# Patient Record
Sex: FEMALE | Race: WHITE | NOT HISPANIC OR LATINO | URBAN - METROPOLITAN AREA
[De-identification: names, ages, dates, MRNs, and addresses within clinical notes are randomized per-mention and may not be internally consistent; named-entity substitution may affect disease eponyms.]

---

## 2023-01-01 ENCOUNTER — APPOINTMENT (OUTPATIENT)
Dept: RADIOLOGY | Facility: HOSPITAL | Age: 83
DRG: 193 | End: 2023-01-01
Payer: MEDICARE

## 2023-01-01 ENCOUNTER — HOSPITAL ENCOUNTER (INPATIENT)
Facility: HOSPITAL | Age: 83
LOS: 4 days | Discharge: NURSING, REHAB OR LONG-TERM CARE | DRG: 193 | End: 2023-11-22
Attending: EMERGENCY MEDICINE | Admitting: STUDENT IN AN ORGANIZED HEALTH CARE EDUCATION/TRAINING PROGRAM
Payer: MEDICARE

## 2023-01-01 ENCOUNTER — APPOINTMENT (OUTPATIENT)
Dept: CARDIOLOGY | Facility: HOSPITAL | Age: 83
DRG: 193 | End: 2023-01-01
Payer: MEDICARE

## 2023-01-01 ENCOUNTER — HOSPITAL ENCOUNTER (INPATIENT)
Facility: HOSPITAL | Age: 83
LOS: 6 days | Discharge: HOSPICE | DRG: 193 | End: 2023-11-30
Attending: EMERGENCY MEDICINE | Admitting: STUDENT IN AN ORGANIZED HEALTH CARE EDUCATION/TRAINING PROGRAM
Payer: MEDICARE

## 2023-01-01 ENCOUNTER — HOSPITAL ENCOUNTER (INPATIENT)
Facility: HOSPITAL | Age: 83
LOS: 1 days | DRG: 193 | End: 2023-12-01
Attending: STUDENT IN AN ORGANIZED HEALTH CARE EDUCATION/TRAINING PROGRAM | Admitting: STUDENT IN AN ORGANIZED HEALTH CARE EDUCATION/TRAINING PROGRAM
Payer: MEDICARE

## 2023-01-01 VITALS
SYSTOLIC BLOOD PRESSURE: 129 MMHG | DIASTOLIC BLOOD PRESSURE: 61 MMHG | WEIGHT: 160.05 LBS | BODY MASS INDEX: 30.22 KG/M2 | RESPIRATION RATE: 20 BRPM | HEIGHT: 61 IN | TEMPERATURE: 97.2 F | HEART RATE: 73 BPM | OXYGEN SATURATION: 84 %

## 2023-01-01 VITALS
TEMPERATURE: 102.2 F | HEART RATE: 57 BPM | OXYGEN SATURATION: 91 % | SYSTOLIC BLOOD PRESSURE: 66 MMHG | DIASTOLIC BLOOD PRESSURE: 36 MMHG | RESPIRATION RATE: 20 BRPM

## 2023-01-01 VITALS
RESPIRATION RATE: 46 BRPM | HEIGHT: 67 IN | DIASTOLIC BLOOD PRESSURE: 86 MMHG | TEMPERATURE: 97.7 F | WEIGHT: 160.05 LBS | OXYGEN SATURATION: 91 % | SYSTOLIC BLOOD PRESSURE: 164 MMHG | BODY MASS INDEX: 25.12 KG/M2 | HEART RATE: 76 BPM

## 2023-01-01 DIAGNOSIS — I48.0 PAROXYSMAL ATRIAL FIBRILLATION (MULTI): ICD-10-CM

## 2023-01-01 DIAGNOSIS — R41.0 DISORIENTATION: Primary | ICD-10-CM

## 2023-01-01 DIAGNOSIS — I50.22 CHRONIC SYSTOLIC HEART FAILURE (MULTI): ICD-10-CM

## 2023-01-01 DIAGNOSIS — M81.0 AGE RELATED OSTEOPOROSIS, UNSPECIFIED PATHOLOGICAL FRACTURE PRESENCE: Chronic | ICD-10-CM

## 2023-01-01 DIAGNOSIS — I50.43 CHF (CONGESTIVE HEART FAILURE), NYHA CLASS I, ACUTE ON CHRONIC, COMBINED (MULTI): Primary | ICD-10-CM

## 2023-01-01 DIAGNOSIS — J96.12 CHRONIC RESPIRATORY FAILURE WITH HYPOXIA AND HYPERCAPNIA (MULTI): ICD-10-CM

## 2023-01-01 DIAGNOSIS — J96.11 CHRONIC RESPIRATORY FAILURE WITH HYPOXIA AND HYPERCAPNIA (MULTI): ICD-10-CM

## 2023-01-01 DIAGNOSIS — J18.9 PNEUMONIA DUE TO INFECTIOUS ORGANISM, UNSPECIFIED LATERALITY, UNSPECIFIED PART OF LUNG: Primary | ICD-10-CM

## 2023-01-01 DIAGNOSIS — J16.0 CAP (COMMUNITY ACQUIRED PNEUMONIA) DUE TO CHLAMYDIA SPECIES: ICD-10-CM

## 2023-01-01 DIAGNOSIS — N39.0 LOWER URINARY TRACT INFECTIOUS DISEASE: ICD-10-CM

## 2023-01-01 DIAGNOSIS — J96.01 ACUTE RESPIRATORY FAILURE WITH HYPOXIA (MULTI): ICD-10-CM

## 2023-01-01 LAB
ABO GROUP (TYPE) IN BLOOD: NORMAL
ALBUMIN SERPL BCP-MCNC: 3 G/DL (ref 3.4–5)
ALBUMIN SERPL BCP-MCNC: 3 G/DL (ref 3.4–5)
ALBUMIN SERPL BCP-MCNC: 3.2 G/DL (ref 3.4–5)
ALBUMIN SERPL BCP-MCNC: 3.7 G/DL (ref 3.4–5)
ALP SERPL-CCNC: 57 U/L (ref 33–136)
ALP SERPL-CCNC: 59 U/L (ref 33–136)
ALP SERPL-CCNC: 82 U/L (ref 33–136)
ALT SERPL W P-5'-P-CCNC: 10 U/L (ref 7–45)
ALT SERPL W P-5'-P-CCNC: 10 U/L (ref 7–45)
ALT SERPL W P-5'-P-CCNC: 20 U/L (ref 7–45)
ANION GAP BLDA CALCULATED.4IONS-SCNC: -2 MMO/L (ref 10–25)
ANION GAP BLDA CALCULATED.4IONS-SCNC: -3 MMO/L (ref 10–25)
ANION GAP SERPL CALC-SCNC: 10 MMOL/L (ref 10–20)
ANION GAP SERPL CALC-SCNC: 11 MMOL/L (ref 10–20)
ANION GAP SERPL CALC-SCNC: 12 MMOL/L (ref 10–20)
ANION GAP SERPL CALC-SCNC: 14 MMOL/L (ref 10–20)
ANION GAP SERPL CALC-SCNC: 7 MMOL/L (ref 10–20)
ANION GAP SERPL CALC-SCNC: 7 MMOL/L (ref 10–20)
ANION GAP SERPL CALC-SCNC: 8 MMOL/L (ref 10–20)
ANION GAP SERPL CALC-SCNC: 9 MMOL/L (ref 10–20)
ANION GAP SERPL CALC-SCNC: <7 MMOL/L (ref 10–20)
ANTIBODY SCREEN: NORMAL
AORTIC VALVE MEAN GRADIENT: 10
AORTIC VALVE PEAK VELOCITY: 2.19
APPEARANCE UR: ABNORMAL
APPEARANCE UR: CLEAR
APTT PPP: 39 SECONDS (ref 27–38)
ARTERIAL PATENCY WRIST A: POSITIVE
ARTERIAL PATENCY WRIST A: POSITIVE
AST SERPL W P-5'-P-CCNC: 11 U/L (ref 9–39)
AST SERPL W P-5'-P-CCNC: 17 U/L (ref 9–39)
AST SERPL W P-5'-P-CCNC: 17 U/L (ref 9–39)
AV PEAK GRADIENT: 19.2
AVA (PEAK VEL): 1.76
AVA (VTI): 1.73
BACTERIA BLD CULT: NORMAL
BACTERIA SPEC RESP CULT: ABNORMAL
BASE EXCESS BLDA CALC-SCNC: 18.2 MMOL/L (ref -2–3)
BASE EXCESS BLDA CALC-SCNC: 18.4 MMOL/L (ref -2–3)
BASE EXCESS BLDV CALC-SCNC: 13.5 MMOL/L (ref -2–3)
BASE EXCESS BLDV CALC-SCNC: 8.8 MMOL/L (ref -2–3)
BASOPHILS # BLD AUTO: 0 X10*3/UL (ref 0–0.1)
BASOPHILS # BLD AUTO: 0.02 X10*3/UL (ref 0–0.1)
BASOPHILS # BLD AUTO: 0.03 X10*3/UL (ref 0–0.1)
BASOPHILS NFR BLD AUTO: 0 %
BASOPHILS NFR BLD AUTO: 0.3 %
BASOPHILS NFR BLD AUTO: 0.8 %
BILIRUB SERPL-MCNC: 0.5 MG/DL (ref 0–1.2)
BILIRUB SERPL-MCNC: 0.6 MG/DL (ref 0–1.2)
BILIRUB SERPL-MCNC: 0.6 MG/DL (ref 0–1.2)
BILIRUB UR STRIP.AUTO-MCNC: NEGATIVE MG/DL
BILIRUB UR STRIP.AUTO-MCNC: NEGATIVE MG/DL
BNP SERPL-MCNC: 138 PG/ML (ref 0–99)
BNP SERPL-MCNC: 185 PG/ML (ref 0–99)
BODY TEMPERATURE: 37 DEGREES CELSIUS
BODY TEMPERATURE: ABNORMAL
BUN SERPL-MCNC: 10 MG/DL (ref 6–23)
BUN SERPL-MCNC: 12 MG/DL (ref 6–23)
BUN SERPL-MCNC: 14 MG/DL (ref 6–23)
BUN SERPL-MCNC: 18 MG/DL (ref 6–23)
BUN SERPL-MCNC: 20 MG/DL (ref 6–23)
BUN SERPL-MCNC: 20 MG/DL (ref 6–23)
BUN SERPL-MCNC: 22 MG/DL (ref 6–23)
BUN SERPL-MCNC: 24 MG/DL (ref 6–23)
BUN SERPL-MCNC: 26 MG/DL (ref 6–23)
BUN SERPL-MCNC: 26 MG/DL (ref 6–23)
BUN SERPL-MCNC: 27 MG/DL (ref 6–23)
BUN SERPL-MCNC: 27 MG/DL (ref 6–23)
BUN SERPL-MCNC: 28 MG/DL (ref 6–23)
BUN SERPL-MCNC: 31 MG/DL (ref 6–23)
BUN SERPL-MCNC: 9 MG/DL (ref 6–23)
CA-I BLDA-SCNC: 1.16 MMOL/L (ref 1.1–1.33)
CA-I BLDA-SCNC: 1.19 MMOL/L (ref 1.1–1.33)
CALCIUM SERPL-MCNC: 8.4 MG/DL (ref 8.6–10.3)
CALCIUM SERPL-MCNC: 8.5 MG/DL (ref 8.6–10.3)
CALCIUM SERPL-MCNC: 8.6 MG/DL (ref 8.6–10.3)
CALCIUM SERPL-MCNC: 8.7 MG/DL (ref 8.6–10.3)
CALCIUM SERPL-MCNC: 8.8 MG/DL (ref 8.6–10.3)
CALCIUM SERPL-MCNC: 8.9 MG/DL (ref 8.6–10.3)
CALCIUM SERPL-MCNC: 8.9 MG/DL (ref 8.6–10.3)
CALCIUM SERPL-MCNC: 9.1 MG/DL (ref 8.6–10.3)
CALCIUM SERPL-MCNC: 9.3 MG/DL (ref 8.6–10.3)
CARDIAC TROPONIN I PNL SERPL HS: 20 NG/L (ref 0–13)
CARDIAC TROPONIN I PNL SERPL HS: 26 NG/L (ref 0–13)
CARDIAC TROPONIN I PNL SERPL HS: 28 NG/L (ref 0–13)
CHLORIDE BLDA-SCNC: 96 MMOL/L (ref 98–107)
CHLORIDE BLDA-SCNC: 96 MMOL/L (ref 98–107)
CHLORIDE SERPL-SCNC: 100 MMOL/L (ref 98–107)
CHLORIDE SERPL-SCNC: 92 MMOL/L (ref 98–107)
CHLORIDE SERPL-SCNC: 95 MMOL/L (ref 98–107)
CHLORIDE SERPL-SCNC: 96 MMOL/L (ref 98–107)
CHLORIDE SERPL-SCNC: 96 MMOL/L (ref 98–107)
CHLORIDE SERPL-SCNC: 97 MMOL/L (ref 98–107)
CHLORIDE SERPL-SCNC: 98 MMOL/L (ref 98–107)
CHLORIDE SERPL-SCNC: 99 MMOL/L (ref 98–107)
CHLORIDE SERPL-SCNC: 99 MMOL/L (ref 98–107)
CO2 SERPL-SCNC: 29 MMOL/L (ref 21–32)
CO2 SERPL-SCNC: 33 MMOL/L (ref 21–32)
CO2 SERPL-SCNC: 36 MMOL/L (ref 21–32)
CO2 SERPL-SCNC: 37 MMOL/L (ref 21–32)
CO2 SERPL-SCNC: 38 MMOL/L (ref 21–32)
CO2 SERPL-SCNC: 38 MMOL/L (ref 21–32)
CO2 SERPL-SCNC: 39 MMOL/L (ref 21–32)
CO2 SERPL-SCNC: 40 MMOL/L (ref 21–32)
CO2 SERPL-SCNC: 40 MMOL/L (ref 21–32)
CO2 SERPL-SCNC: 41 MMOL/L (ref 21–32)
CO2 SERPL-SCNC: 41 MMOL/L (ref 21–32)
CO2 SERPL-SCNC: 42 MMOL/L (ref 21–32)
CO2 SERPL-SCNC: 44 MMOL/L (ref 21–32)
CO2 SERPL-SCNC: 44 MMOL/L (ref 21–32)
COLOR UR: YELLOW
COLOR UR: YELLOW
CREAT SERPL-MCNC: 0.6 MG/DL (ref 0.5–1.05)
CREAT SERPL-MCNC: 0.6 MG/DL (ref 0.5–1.05)
CREAT SERPL-MCNC: 0.62 MG/DL (ref 0.5–1.05)
CREAT SERPL-MCNC: 0.65 MG/DL (ref 0.5–1.05)
CREAT SERPL-MCNC: 0.66 MG/DL (ref 0.5–1.05)
CREAT SERPL-MCNC: 0.71 MG/DL (ref 0.5–1.05)
CREAT SERPL-MCNC: 0.75 MG/DL (ref 0.5–1.05)
CREAT SERPL-MCNC: 0.76 MG/DL (ref 0.5–1.05)
CREAT SERPL-MCNC: 0.76 MG/DL (ref 0.5–1.05)
CREAT SERPL-MCNC: 0.8 MG/DL (ref 0.5–1.05)
CREAT SERPL-MCNC: 0.8 MG/DL (ref 0.5–1.05)
CREAT SERPL-MCNC: 0.82 MG/DL (ref 0.5–1.05)
CREAT SERPL-MCNC: 0.83 MG/DL (ref 0.5–1.05)
CREAT SERPL-MCNC: 0.83 MG/DL (ref 0.5–1.05)
CREAT SERPL-MCNC: 0.86 MG/DL (ref 0.5–1.05)
CRITICAL CALL TIME: 1
CRITICAL CALL TIME: 2038
CRITICAL CALLED BY: ABNORMAL
CRITICAL CALLED BY: ABNORMAL
CRITICAL CALLED TO: ABNORMAL
CRITICAL CALLED TO: ABNORMAL
CRITICAL READ BACK: ABNORMAL
CRITICAL READ BACK: ABNORMAL
DIGOXIN SERPL-MCNC: 1.18 NG/ML (ref 0.8–?)
EJECTION FRACTION APICAL 4 CHAMBER: 52.4
EJECTION FRACTION: 51
EOSINOPHIL # BLD AUTO: 0 X10*3/UL (ref 0–0.4)
EOSINOPHIL # BLD AUTO: 0.18 X10*3/UL (ref 0–0.4)
EOSINOPHIL # BLD AUTO: 0.39 X10*3/UL (ref 0–0.4)
EOSINOPHIL NFR BLD AUTO: 0 %
EOSINOPHIL NFR BLD AUTO: 4.7 %
EOSINOPHIL NFR BLD AUTO: 5.2 %
EPAP CMH2O: 8 CM H2O
ERYTHROCYTE [DISTWIDTH] IN BLOOD BY AUTOMATED COUNT: 12.6 % (ref 11.5–14.5)
ERYTHROCYTE [DISTWIDTH] IN BLOOD BY AUTOMATED COUNT: 12.7 % (ref 11.5–14.5)
ERYTHROCYTE [DISTWIDTH] IN BLOOD BY AUTOMATED COUNT: 12.8 % (ref 11.5–14.5)
ERYTHROCYTE [DISTWIDTH] IN BLOOD BY AUTOMATED COUNT: 12.9 % (ref 11.5–14.5)
ERYTHROCYTE [DISTWIDTH] IN BLOOD BY AUTOMATED COUNT: 12.9 % (ref 11.5–14.5)
ERYTHROCYTE [DISTWIDTH] IN BLOOD BY AUTOMATED COUNT: 13 % (ref 11.5–14.5)
ERYTHROCYTE [DISTWIDTH] IN BLOOD BY AUTOMATED COUNT: 13 % (ref 11.5–14.5)
ERYTHROCYTE [DISTWIDTH] IN BLOOD BY AUTOMATED COUNT: 13.2 % (ref 11.5–14.5)
FLUAV RNA RESP QL NAA+PROBE: NOT DETECTED
FLUAV RNA RESP QL NAA+PROBE: NOT DETECTED
FLUBV RNA RESP QL NAA+PROBE: NOT DETECTED
FLUBV RNA RESP QL NAA+PROBE: NOT DETECTED
FOLATE SERPL-MCNC: 7.9 NG/ML
FREQUENCY (BPM): 37 BPM
GFR SERPL CREATININE-BSD FRML MDRD: 66 ML/MIN/1.73M*2
GFR SERPL CREATININE-BSD FRML MDRD: 69 ML/MIN/1.73M*2
GFR SERPL CREATININE-BSD FRML MDRD: 69 ML/MIN/1.73M*2
GFR SERPL CREATININE-BSD FRML MDRD: 70 ML/MIN/1.73M*2
GFR SERPL CREATININE-BSD FRML MDRD: 72 ML/MIN/1.73M*2
GFR SERPL CREATININE-BSD FRML MDRD: 72 ML/MIN/1.73M*2
GFR SERPL CREATININE-BSD FRML MDRD: 77 ML/MIN/1.73M*2
GFR SERPL CREATININE-BSD FRML MDRD: 77 ML/MIN/1.73M*2
GFR SERPL CREATININE-BSD FRML MDRD: 78 ML/MIN/1.73M*2
GFR SERPL CREATININE-BSD FRML MDRD: 83 ML/MIN/1.73M*2
GFR SERPL CREATININE-BSD FRML MDRD: 86 ML/MIN/1.73M*2
GFR SERPL CREATININE-BSD FRML MDRD: 87 ML/MIN/1.73M*2
GFR SERPL CREATININE-BSD FRML MDRD: 88 ML/MIN/1.73M*2
GFR SERPL CREATININE-BSD FRML MDRD: 88 ML/MIN/1.73M*2
GLOBAL LONGITUDINAL STRAIN: 15.9
GLUCOSE BLD MANUAL STRIP-MCNC: 132 MG/DL (ref 74–99)
GLUCOSE BLDA-MCNC: 101 MG/DL (ref 74–99)
GLUCOSE BLDA-MCNC: 96 MG/DL (ref 74–99)
GLUCOSE SERPL-MCNC: 104 MG/DL (ref 74–99)
GLUCOSE SERPL-MCNC: 109 MG/DL (ref 74–99)
GLUCOSE SERPL-MCNC: 114 MG/DL (ref 74–99)
GLUCOSE SERPL-MCNC: 118 MG/DL (ref 74–99)
GLUCOSE SERPL-MCNC: 123 MG/DL (ref 74–99)
GLUCOSE SERPL-MCNC: 135 MG/DL (ref 74–99)
GLUCOSE SERPL-MCNC: 158 MG/DL (ref 74–99)
GLUCOSE SERPL-MCNC: 165 MG/DL (ref 74–99)
GLUCOSE SERPL-MCNC: 166 MG/DL (ref 74–99)
GLUCOSE SERPL-MCNC: 79 MG/DL (ref 74–99)
GLUCOSE SERPL-MCNC: 81 MG/DL (ref 74–99)
GLUCOSE SERPL-MCNC: 83 MG/DL (ref 74–99)
GLUCOSE SERPL-MCNC: 84 MG/DL (ref 74–99)
GLUCOSE SERPL-MCNC: 91 MG/DL (ref 74–99)
GLUCOSE SERPL-MCNC: 92 MG/DL (ref 74–99)
GLUCOSE SERPL-MCNC: 92 MG/DL (ref 74–99)
GLUCOSE SERPL-MCNC: 95 MG/DL (ref 74–99)
GLUCOSE UR STRIP.AUTO-MCNC: ABNORMAL MG/DL
GLUCOSE UR STRIP.AUTO-MCNC: NEGATIVE MG/DL
GRAM STN SPEC: ABNORMAL
HCO3 BLDA-SCNC: 48 MMOL/L (ref 22–26)
HCO3 BLDA-SCNC: 49.8 MMOL/L (ref 22–26)
HCO3 BLDV-SCNC: 37 MMOL/L (ref 22–26)
HCO3 BLDV-SCNC: 41.8 MMOL/L (ref 22–26)
HCT VFR BLD AUTO: 31.9 % (ref 36–46)
HCT VFR BLD AUTO: 33.3 % (ref 36–46)
HCT VFR BLD AUTO: 33.5 % (ref 36–46)
HCT VFR BLD AUTO: 33.6 % (ref 36–46)
HCT VFR BLD AUTO: 33.7 % (ref 36–46)
HCT VFR BLD AUTO: 35.4 % (ref 36–46)
HCT VFR BLD AUTO: 35.4 % (ref 36–46)
HCT VFR BLD AUTO: 36.7 % (ref 36–46)
HCT VFR BLD AUTO: 36.7 % (ref 36–46)
HCT VFR BLD AUTO: 37.3 % (ref 36–46)
HCT VFR BLD AUTO: 38.2 % (ref 36–46)
HCT VFR BLD AUTO: 38.3 % (ref 36–46)
HCT VFR BLD AUTO: 38.3 % (ref 36–46)
HCT VFR BLD AUTO: 38.6 % (ref 36–46)
HCT VFR BLD AUTO: 38.8 % (ref 36–46)
HCT VFR BLD AUTO: 38.8 % (ref 36–46)
HCT VFR BLD AUTO: 39.7 % (ref 36–46)
HCT VFR BLD AUTO: 40.9 % (ref 36–46)
HCT VFR BLD EST: 35 % (ref 36–46)
HCT VFR BLD EST: 35 % (ref 36–46)
HGB BLD-MCNC: 10.4 G/DL (ref 12–16)
HGB BLD-MCNC: 10.4 G/DL (ref 12–16)
HGB BLD-MCNC: 10.8 G/DL (ref 12–16)
HGB BLD-MCNC: 10.8 G/DL (ref 12–16)
HGB BLD-MCNC: 10.9 G/DL (ref 12–16)
HGB BLD-MCNC: 11.1 G/DL (ref 12–16)
HGB BLD-MCNC: 11.2 G/DL (ref 12–16)
HGB BLD-MCNC: 11.2 G/DL (ref 12–16)
HGB BLD-MCNC: 11.3 G/DL (ref 12–16)
HGB BLD-MCNC: 11.4 G/DL (ref 12–16)
HGB BLD-MCNC: 11.4 G/DL (ref 12–16)
HGB BLD-MCNC: 11.6 G/DL (ref 12–16)
HGB BLD-MCNC: 11.7 G/DL (ref 12–16)
HGB BLD-MCNC: 11.8 G/DL (ref 12–16)
HGB BLD-MCNC: 12.4 G/DL (ref 12–16)
HGB BLD-MCNC: 12.4 G/DL (ref 12–16)
HGB BLD-MCNC: 9.7 G/DL (ref 12–16)
HGB BLD-MCNC: 9.8 G/DL (ref 12–16)
HGB BLDA-MCNC: 11.7 G/DL (ref 12–16)
HGB BLDA-MCNC: 11.8 G/DL (ref 12–16)
HOLD SPECIMEN: NORMAL
IMM GRANULOCYTES # BLD AUTO: 0.02 X10*3/UL (ref 0–0.5)
IMM GRANULOCYTES # BLD AUTO: 0.02 X10*3/UL (ref 0–0.5)
IMM GRANULOCYTES # BLD AUTO: 0.05 X10*3/UL (ref 0–0.5)
IMM GRANULOCYTES NFR BLD AUTO: 0.5 % (ref 0–0.9)
IMM GRANULOCYTES NFR BLD AUTO: 0.5 % (ref 0–0.9)
IMM GRANULOCYTES NFR BLD AUTO: 0.7 % (ref 0–0.9)
INHALED O2 CONCENTRATION: 32 %
INHALED O2 CONCENTRATION: 36 %
INHALED O2 CONCENTRATION: 50 %
INHALED O2 CONCENTRATION: 50 %
INR PPP: 1.3 (ref 0.9–1.1)
INSPIRATORY TIME: 0.9
IPAP CMH2O: 16 CM H2O
KETONES UR STRIP.AUTO-MCNC: NEGATIVE MG/DL
KETONES UR STRIP.AUTO-MCNC: NEGATIVE MG/DL
LACTATE BLDA-SCNC: 0.6 MMOL/L (ref 0.4–2)
LACTATE BLDA-SCNC: 0.8 MMOL/L (ref 0.4–2)
LACTATE SERPL-SCNC: 1.2 MMOL/L (ref 0.4–2)
LEFT VENTRICLE INTERNAL DIMENSION DIASTOLE: 4.63 (ref 3.5–6)
LEFT VENTRICULAR OUTFLOW TRACT DIAMETER: 2.1
LEGIONELLA AG UR QL: NEGATIVE
LEGIONELLA AG UR QL: NEGATIVE
LEUKOCYTE ESTERASE UR QL STRIP.AUTO: NEGATIVE
LEUKOCYTE ESTERASE UR QL STRIP.AUTO: NEGATIVE
LYMPHOCYTES # BLD AUTO: 0.26 X10*3/UL (ref 0.8–3)
LYMPHOCYTES # BLD AUTO: 0.47 X10*3/UL (ref 0.8–3)
LYMPHOCYTES # BLD AUTO: 0.59 X10*3/UL (ref 0.8–3)
LYMPHOCYTES NFR BLD AUTO: 15.2 %
LYMPHOCYTES NFR BLD AUTO: 5.9 %
LYMPHOCYTES NFR BLD AUTO: 6.3 %
M PNEUMO IGM SER IA-ACNC: 0 U/L
M PNEUMO IGM SER IA-ACNC: 0.09 U/L
MAGNESIUM SERPL-MCNC: 1.56 MG/DL (ref 1.6–2.4)
MAGNESIUM SERPL-MCNC: 1.59 MG/DL (ref 1.6–2.4)
MAGNESIUM SERPL-MCNC: 1.7 MG/DL (ref 1.6–2.4)
MAGNESIUM SERPL-MCNC: 1.71 MG/DL (ref 1.6–2.4)
MAGNESIUM SERPL-MCNC: 1.75 MG/DL (ref 1.6–2.4)
MAGNESIUM SERPL-MCNC: 1.8 MG/DL (ref 1.6–2.4)
MAGNESIUM SERPL-MCNC: 1.94 MG/DL (ref 1.6–2.4)
MAGNESIUM SERPL-MCNC: 1.96 MG/DL (ref 1.6–2.4)
MAGNESIUM SERPL-MCNC: 2.05 MG/DL (ref 1.6–2.4)
MAGNESIUM SERPL-MCNC: 2.09 MG/DL (ref 1.6–2.4)
MAGNESIUM SERPL-MCNC: 2.15 MG/DL (ref 1.6–2.4)
MAGNESIUM SERPL-MCNC: 2.18 MG/DL (ref 1.6–2.4)
MAGNESIUM SERPL-MCNC: 2.25 MG/DL (ref 1.6–2.4)
MCH RBC QN AUTO: 29.2 PG (ref 26–34)
MCH RBC QN AUTO: 29.2 PG (ref 26–34)
MCH RBC QN AUTO: 29.3 PG (ref 26–34)
MCH RBC QN AUTO: 29.6 PG (ref 26–34)
MCH RBC QN AUTO: 29.6 PG (ref 26–34)
MCH RBC QN AUTO: 29.7 PG (ref 26–34)
MCH RBC QN AUTO: 29.9 PG (ref 26–34)
MCH RBC QN AUTO: 30 PG (ref 26–34)
MCH RBC QN AUTO: 30 PG (ref 26–34)
MCH RBC QN AUTO: 30.1 PG (ref 26–34)
MCH RBC QN AUTO: 30.2 PG (ref 26–34)
MCH RBC QN AUTO: 30.2 PG (ref 26–34)
MCH RBC QN AUTO: 31.1 PG (ref 26–34)
MCH RBC QN AUTO: 31.2 PG (ref 26–34)
MCHC RBC AUTO-ENTMCNC: 29.4 G/DL (ref 32–36)
MCHC RBC AUTO-ENTMCNC: 29.6 G/DL (ref 32–36)
MCHC RBC AUTO-ENTMCNC: 29.8 G/DL (ref 32–36)
MCHC RBC AUTO-ENTMCNC: 30.1 G/DL (ref 32–36)
MCHC RBC AUTO-ENTMCNC: 30.3 G/DL (ref 32–36)
MCHC RBC AUTO-ENTMCNC: 30.4 G/DL (ref 32–36)
MCHC RBC AUTO-ENTMCNC: 30.5 G/DL (ref 32–36)
MCHC RBC AUTO-ENTMCNC: 30.8 G/DL (ref 32–36)
MCHC RBC AUTO-ENTMCNC: 30.8 G/DL (ref 32–36)
MCHC RBC AUTO-ENTMCNC: 31 G/DL (ref 32–36)
MCHC RBC AUTO-ENTMCNC: 31 G/DL (ref 32–36)
MCHC RBC AUTO-ENTMCNC: 31.2 G/DL (ref 32–36)
MCHC RBC AUTO-ENTMCNC: 32 G/DL (ref 32–36)
MCV RBC AUTO: 100 FL (ref 80–100)
MCV RBC AUTO: 101 FL (ref 80–100)
MCV RBC AUTO: 102 FL (ref 80–100)
MCV RBC AUTO: 103 FL (ref 80–100)
MCV RBC AUTO: 96 FL (ref 80–100)
MCV RBC AUTO: 97 FL (ref 80–100)
MCV RBC AUTO: 97 FL (ref 80–100)
MCV RBC AUTO: 98 FL (ref 80–100)
MCV RBC AUTO: 99 FL (ref 80–100)
MCV RBC AUTO: 99 FL (ref 80–100)
MITRAL VALVE E/A RATIO: 2.69
MONOCYTES # BLD AUTO: 0.32 X10*3/UL (ref 0.05–0.8)
MONOCYTES # BLD AUTO: 0.6 X10*3/UL (ref 0.05–0.8)
MONOCYTES # BLD AUTO: 1.12 X10*3/UL (ref 0.05–0.8)
MONOCYTES NFR BLD AUTO: 15.1 %
MONOCYTES NFR BLD AUTO: 15.5 %
MONOCYTES NFR BLD AUTO: 7.3 %
MRSA DNA SPEC QL NAA+PROBE: NOT DETECTED
NEUTROPHILS # BLD AUTO: 2.45 X10*3/UL (ref 1.6–5.5)
NEUTROPHILS # BLD AUTO: 3.77 X10*3/UL (ref 1.6–5.5)
NEUTROPHILS # BLD AUTO: 5.38 X10*3/UL (ref 1.6–5.5)
NEUTROPHILS NFR BLD AUTO: 63.3 %
NEUTROPHILS NFR BLD AUTO: 72.4 %
NEUTROPHILS NFR BLD AUTO: 86.3 %
NITRITE UR QL STRIP.AUTO: NEGATIVE
NITRITE UR QL STRIP.AUTO: NEGATIVE
NRBC BLD-RTO: 0 /100 WBCS (ref 0–0)
NRBC BLD-RTO: 0.6 /100 WBCS (ref 0–0)
NRBC BLD-RTO: 0.8 /100 WBCS (ref 0–0)
OXYHGB MFR BLDA: 92.4 % (ref 94–98)
OXYHGB MFR BLDA: 95.2 % (ref 94–98)
OXYHGB MFR BLDV: 71.4 % (ref 45–75)
OXYHGB MFR BLDV: 87.2 % (ref 45–75)
PCO2 BLDA: 106 MM HG (ref 38–42)
PCO2 BLDA: 89 MM HG (ref 38–42)
PCO2 BLDV: 67 MM HG (ref 41–51)
PCO2 BLDV: 69 MM HG (ref 41–51)
PEAK PRESSURE: 17 CM H2O
PEEP CMH2O: 8 CM H2O
PH BLDA: 7.28 PH (ref 7.38–7.42)
PH BLDA: 7.34 PH (ref 7.38–7.42)
PH BLDV: 7.35 PH (ref 7.33–7.43)
PH BLDV: 7.39 PH (ref 7.33–7.43)
PH UR STRIP.AUTO: 6 [PH]
PH UR STRIP.AUTO: 7 [PH]
PHOSPHATE SERPL-MCNC: 2.6 MG/DL (ref 2.5–4.9)
PLATELET # BLD AUTO: 127 X10*3/UL (ref 150–450)
PLATELET # BLD AUTO: 131 X10*3/UL (ref 150–450)
PLATELET # BLD AUTO: 144 X10*3/UL (ref 150–450)
PLATELET # BLD AUTO: 145 X10*3/UL (ref 150–450)
PLATELET # BLD AUTO: 145 X10*3/UL (ref 150–450)
PLATELET # BLD AUTO: 149 X10*3/UL (ref 150–450)
PLATELET # BLD AUTO: 152 X10*3/UL (ref 150–450)
PLATELET # BLD AUTO: 153 X10*3/UL (ref 150–450)
PLATELET # BLD AUTO: 159 X10*3/UL (ref 150–450)
PLATELET # BLD AUTO: 160 X10*3/UL (ref 150–450)
PLATELET # BLD AUTO: 164 X10*3/UL (ref 150–450)
PLATELET # BLD AUTO: 167 X10*3/UL (ref 150–450)
PLATELET # BLD AUTO: 176 X10*3/UL (ref 150–450)
PLATELET # BLD AUTO: 180 X10*3/UL (ref 150–450)
PLATELET # BLD AUTO: 181 X10*3/UL (ref 150–450)
PLATELET # BLD AUTO: 181 X10*3/UL (ref 150–450)
PLATELET # BLD AUTO: 197 X10*3/UL (ref 150–450)
PLATELET # BLD AUTO: 207 X10*3/UL (ref 150–450)
PO2 BLDA: 71 MM HG (ref 85–95)
PO2 BLDA: 84 MM HG (ref 85–95)
PO2 BLDV: 44 MM HG (ref 35–45)
PO2 BLDV: 59 MM HG (ref 35–45)
POTASSIUM BLDA-SCNC: 3.1 MMOL/L (ref 3.5–5.3)
POTASSIUM BLDA-SCNC: 3.2 MMOL/L (ref 3.5–5.3)
POTASSIUM SERPL-SCNC: 3 MMOL/L (ref 3.5–5.3)
POTASSIUM SERPL-SCNC: 3.4 MMOL/L (ref 3.5–5.3)
POTASSIUM SERPL-SCNC: 3.5 MMOL/L (ref 3.5–5.3)
POTASSIUM SERPL-SCNC: 3.6 MMOL/L (ref 3.5–5.3)
POTASSIUM SERPL-SCNC: 3.8 MMOL/L (ref 3.5–5.3)
POTASSIUM SERPL-SCNC: 3.9 MMOL/L (ref 3.5–5.3)
POTASSIUM SERPL-SCNC: 3.9 MMOL/L (ref 3.5–5.3)
POTASSIUM SERPL-SCNC: 4 MMOL/L (ref 3.5–5.3)
POTASSIUM SERPL-SCNC: 4 MMOL/L (ref 3.5–5.3)
POTASSIUM SERPL-SCNC: 4.1 MMOL/L (ref 3.5–5.3)
POTASSIUM SERPL-SCNC: 4.1 MMOL/L (ref 3.5–5.3)
POTASSIUM SERPL-SCNC: 4.2 MMOL/L (ref 3.5–5.3)
POTASSIUM SERPL-SCNC: 4.3 MMOL/L (ref 3.5–5.3)
PROCALCITONIN SERPL-MCNC: 0.12 NG/ML
PROT SERPL-MCNC: 5.4 G/DL (ref 6.4–8.2)
PROT SERPL-MCNC: 5.5 G/DL (ref 6.4–8.2)
PROT SERPL-MCNC: 6.3 G/DL (ref 6.4–8.2)
PROT UR STRIP.AUTO-MCNC: ABNORMAL MG/DL
PROT UR STRIP.AUTO-MCNC: NEGATIVE MG/DL
PROTHROMBIN TIME: 14.4 SECONDS (ref 9.8–12.8)
RBC # BLD AUTO: 3.23 X10*6/UL (ref 4–5.2)
RBC # BLD AUTO: 3.34 X10*6/UL (ref 4–5.2)
RBC # BLD AUTO: 3.45 X10*6/UL (ref 4–5.2)
RBC # BLD AUTO: 3.46 X10*6/UL (ref 4–5.2)
RBC # BLD AUTO: 3.5 X10*6/UL (ref 4–5.2)
RBC # BLD AUTO: 3.61 X10*6/UL (ref 4–5.2)
RBC # BLD AUTO: 3.63 X10*6/UL (ref 4–5.2)
RBC # BLD AUTO: 3.75 X10*6/UL (ref 4–5.2)
RBC # BLD AUTO: 3.76 X10*6/UL (ref 4–5.2)
RBC # BLD AUTO: 3.77 X10*6/UL (ref 4–5.2)
RBC # BLD AUTO: 3.83 X10*6/UL (ref 4–5.2)
RBC # BLD AUTO: 3.83 X10*6/UL (ref 4–5.2)
RBC # BLD AUTO: 3.85 X10*6/UL (ref 4–5.2)
RBC # BLD AUTO: 3.86 X10*6/UL (ref 4–5.2)
RBC # BLD AUTO: 3.89 X10*6/UL (ref 4–5.2)
RBC # BLD AUTO: 3.92 X10*6/UL (ref 4–5.2)
RBC # BLD AUTO: 3.99 X10*6/UL (ref 4–5.2)
RBC # BLD AUTO: 4.11 X10*6/UL (ref 4–5.2)
RBC # UR STRIP.AUTO: ABNORMAL /UL
RBC # UR STRIP.AUTO: NEGATIVE /UL
RBC #/AREA URNS AUTO: >20 /HPF
RH FACTOR (ANTIGEN D): NORMAL
RIGHT VENTRICLE PEAK SYSTOLIC PRESSURE: 45
S PNEUM AG UR QL: NEGATIVE
S PNEUM AG UR QL: NEGATIVE
SAO2 % BLDA: 95 % (ref 94–100)
SAO2 % BLDA: 98 % (ref 94–100)
SAO2 % BLDV: 74 % (ref 45–75)
SAO2 % BLDV: 90 % (ref 45–75)
SARS-COV-2 RNA RESP QL NAA+PROBE: NOT DETECTED
SARS-COV-2 RNA RESP QL NAA+PROBE: NOT DETECTED
SODIUM BLDA-SCNC: 139 MMOL/L (ref 136–145)
SODIUM BLDA-SCNC: 140 MMOL/L (ref 136–145)
SODIUM SERPL-SCNC: 136 MMOL/L (ref 136–145)
SODIUM SERPL-SCNC: 136 MMOL/L (ref 136–145)
SODIUM SERPL-SCNC: 137 MMOL/L (ref 136–145)
SODIUM SERPL-SCNC: 138 MMOL/L (ref 136–145)
SODIUM SERPL-SCNC: 138 MMOL/L (ref 136–145)
SODIUM SERPL-SCNC: 140 MMOL/L (ref 136–145)
SODIUM SERPL-SCNC: 141 MMOL/L (ref 136–145)
SODIUM SERPL-SCNC: 141 MMOL/L (ref 136–145)
SODIUM SERPL-SCNC: 142 MMOL/L (ref 136–145)
SODIUM SERPL-SCNC: 143 MMOL/L (ref 136–145)
SODIUM SERPL-SCNC: 147 MMOL/L (ref 136–145)
SODIUM SERPL-SCNC: 150 MMOL/L (ref 136–145)
SP GR UR STRIP.AUTO: 1.02
SP GR UR STRIP.AUTO: 1.02
SPECIMEN DRAWN FROM PATIENT: ABNORMAL
SPECIMEN DRAWN FROM PATIENT: ABNORMAL
SPONTANEOUS TIDAL VOLUME: 307 ML
SPONTANEOUS TIDAL VOLUME: 398 ML
SQUAMOUS #/AREA URNS AUTO: ABNORMAL /HPF
TEST COMMENT: ABNORMAL
TIDAL VOLUME: 400 ML
TOTAL MINUTE VOLUME: 10.6 LITER
TOTAL MINUTE VOLUME: 8.5 LITER
TRICUSPID ANNULAR PLANE SYSTOLIC EXCURSION: 1.3
TSH SERPL-ACNC: 1.58 MIU/L (ref 0.44–3.98)
UROBILINOGEN UR STRIP.AUTO-MCNC: <2 MG/DL
UROBILINOGEN UR STRIP.AUTO-MCNC: <2 MG/DL
VENTILATOR MODE: ABNORMAL
VENTILATOR RATE: 20 BPM
VIT B1 PYROPHOSHATE BLD-SCNC: 89 NMOL/L (ref 70–180)
VIT B12 SERPL-MCNC: 1339 PG/ML (ref 211–911)
WBC # BLD AUTO: 10.8 X10*3/UL (ref 4.4–11.3)
WBC # BLD AUTO: 3.1 X10*3/UL (ref 4.4–11.3)
WBC # BLD AUTO: 3.2 X10*3/UL (ref 4.4–11.3)
WBC # BLD AUTO: 3.9 X10*3/UL (ref 4.4–11.3)
WBC # BLD AUTO: 4.3 X10*3/UL (ref 4.4–11.3)
WBC # BLD AUTO: 4.3 X10*3/UL (ref 4.4–11.3)
WBC # BLD AUTO: 4.9 X10*3/UL (ref 4.4–11.3)
WBC # BLD AUTO: 5.4 X10*3/UL (ref 4.4–11.3)
WBC # BLD AUTO: 5.6 X10*3/UL (ref 4.4–11.3)
WBC # BLD AUTO: 6 X10*3/UL (ref 4.4–11.3)
WBC # BLD AUTO: 6.4 X10*3/UL (ref 4.4–11.3)
WBC # BLD AUTO: 6.5 X10*3/UL (ref 4.4–11.3)
WBC # BLD AUTO: 6.6 X10*3/UL (ref 4.4–11.3)
WBC # BLD AUTO: 6.6 X10*3/UL (ref 4.4–11.3)
WBC # BLD AUTO: 6.7 X10*3/UL (ref 4.4–11.3)
WBC # BLD AUTO: 7.4 X10*3/UL (ref 4.4–11.3)
WBC # BLD AUTO: 8.3 X10*3/UL (ref 4.4–11.3)
WBC # BLD AUTO: 9.5 X10*3/UL (ref 4.4–11.3)
WBC #/AREA URNS AUTO: ABNORMAL /HPF

## 2023-01-01 PROCEDURE — G0378 HOSPITAL OBSERVATION PER HR: HCPCS

## 2023-01-01 PROCEDURE — 85027 COMPLETE CBC AUTOMATED: CPT

## 2023-01-01 PROCEDURE — 94640 AIRWAY INHALATION TREATMENT: CPT

## 2023-01-01 PROCEDURE — 99239 HOSP IP/OBS DSCHRG MGMT >30: CPT

## 2023-01-01 PROCEDURE — 87449 NOS EACH ORGANISM AG IA: CPT | Mod: PARLAB

## 2023-01-01 PROCEDURE — 94660 CPAP INITIATION&MGMT: CPT

## 2023-01-01 PROCEDURE — 2500000001 HC RX 250 WO HCPCS SELF ADMINISTERED DRUGS (ALT 637 FOR MEDICARE OP)

## 2023-01-01 PROCEDURE — 36415 COLL VENOUS BLD VENIPUNCTURE: CPT

## 2023-01-01 PROCEDURE — 80048 BASIC METABOLIC PNL TOTAL CA: CPT

## 2023-01-01 PROCEDURE — 2500000004 HC RX 250 GENERAL PHARMACY W/ HCPCS (ALT 636 FOR OP/ED)

## 2023-01-01 PROCEDURE — 1200000002 HC GENERAL ROOM WITH TELEMETRY DAILY

## 2023-01-01 PROCEDURE — 87899 AGENT NOS ASSAY W/OPTIC: CPT | Mod: PARLAB

## 2023-01-01 PROCEDURE — 2500000002 HC RX 250 W HCPCS SELF ADMINISTERED DRUGS (ALT 637 FOR MEDICARE OP, ALT 636 FOR OP/ED): Performed by: STUDENT IN AN ORGANIZED HEALTH CARE EDUCATION/TRAINING PROGRAM

## 2023-01-01 PROCEDURE — 80053 COMPREHEN METABOLIC PANEL: CPT

## 2023-01-01 PROCEDURE — 94668 MNPJ CHEST WALL SBSQ: CPT

## 2023-01-01 PROCEDURE — 2500000002 HC RX 250 W HCPCS SELF ADMINISTERED DRUGS (ALT 637 FOR MEDICARE OP, ALT 636 FOR OP/ED): Performed by: INTERNAL MEDICINE

## 2023-01-01 PROCEDURE — 2500000002 HC RX 250 W HCPCS SELF ADMINISTERED DRUGS (ALT 637 FOR MEDICARE OP, ALT 636 FOR OP/ED): Performed by: EMERGENCY MEDICINE

## 2023-01-01 PROCEDURE — 99232 SBSQ HOSP IP/OBS MODERATE 35: CPT

## 2023-01-01 PROCEDURE — 71045 X-RAY EXAM CHEST 1 VIEW: CPT | Mod: FY

## 2023-01-01 PROCEDURE — 82746 ASSAY OF FOLIC ACID SERUM: CPT | Mod: PARLAB

## 2023-01-01 PROCEDURE — 97535 SELF CARE MNGMENT TRAINING: CPT | Mod: GO

## 2023-01-01 PROCEDURE — 86738 MYCOPLASMA ANTIBODY: CPT

## 2023-01-01 PROCEDURE — 2500000001 HC RX 250 WO HCPCS SELF ADMINISTERED DRUGS (ALT 637 FOR MEDICARE OP): Performed by: STUDENT IN AN ORGANIZED HEALTH CARE EDUCATION/TRAINING PROGRAM

## 2023-01-01 PROCEDURE — 99222 1ST HOSP IP/OBS MODERATE 55: CPT | Performed by: INTERNAL MEDICINE

## 2023-01-01 PROCEDURE — 97535 SELF CARE MNGMENT TRAINING: CPT | Mod: CQ,GP

## 2023-01-01 PROCEDURE — 2500000005 HC RX 250 GENERAL PHARMACY W/O HCPCS

## 2023-01-01 PROCEDURE — 83880 ASSAY OF NATRIURETIC PEPTIDE: CPT | Performed by: EMERGENCY MEDICINE

## 2023-01-01 PROCEDURE — 71046 X-RAY EXAM CHEST 2 VIEWS: CPT | Performed by: RADIOLOGY

## 2023-01-01 PROCEDURE — 82805 BLOOD GASES W/O2 SATURATION: CPT | Performed by: EMERGENCY MEDICINE

## 2023-01-01 PROCEDURE — 85610 PROTHROMBIN TIME: CPT | Performed by: EMERGENCY MEDICINE

## 2023-01-01 PROCEDURE — 71045 X-RAY EXAM CHEST 1 VIEW: CPT | Mod: FOREIGN READ | Performed by: RADIOLOGY

## 2023-01-01 PROCEDURE — 83735 ASSAY OF MAGNESIUM: CPT | Performed by: EMERGENCY MEDICINE

## 2023-01-01 PROCEDURE — 82947 ASSAY GLUCOSE BLOOD QUANT: CPT

## 2023-01-01 PROCEDURE — 87040 BLOOD CULTURE FOR BACTERIA: CPT | Mod: PARLAB

## 2023-01-01 PROCEDURE — 81003 URINALYSIS AUTO W/O SCOPE: CPT

## 2023-01-01 PROCEDURE — 2500000004 HC RX 250 GENERAL PHARMACY W/ HCPCS (ALT 636 FOR OP/ED): Performed by: EMERGENCY MEDICINE

## 2023-01-01 PROCEDURE — 36415 COLL VENOUS BLD VENIPUNCTURE: CPT | Performed by: EMERGENCY MEDICINE

## 2023-01-01 PROCEDURE — 80053 COMPREHEN METABOLIC PANEL: CPT | Performed by: EMERGENCY MEDICINE

## 2023-01-01 PROCEDURE — 84484 ASSAY OF TROPONIN QUANT: CPT | Performed by: EMERGENCY MEDICINE

## 2023-01-01 PROCEDURE — 94760 N-INVAS EAR/PLS OXIMETRY 1: CPT

## 2023-01-01 PROCEDURE — 71275 CT ANGIOGRAPHY CHEST: CPT | Performed by: RADIOLOGY

## 2023-01-01 PROCEDURE — 97112 NEUROMUSCULAR REEDUCATION: CPT | Mod: GP

## 2023-01-01 PROCEDURE — 97116 GAIT TRAINING THERAPY: CPT | Mod: GP,CQ

## 2023-01-01 PROCEDURE — 2500000002 HC RX 250 W HCPCS SELF ADMINISTERED DRUGS (ALT 637 FOR MEDICARE OP, ALT 636 FOR OP/ED)

## 2023-01-01 PROCEDURE — 99232 SBSQ HOSP IP/OBS MODERATE 35: CPT | Performed by: INTERNAL MEDICINE

## 2023-01-01 PROCEDURE — 2500000004 HC RX 250 GENERAL PHARMACY W/ HCPCS (ALT 636 FOR OP/ED): Performed by: STUDENT IN AN ORGANIZED HEALTH CARE EDUCATION/TRAINING PROGRAM

## 2023-01-01 PROCEDURE — 97162 PT EVAL MOD COMPLEX 30 MIN: CPT | Mod: GP

## 2023-01-01 PROCEDURE — 99238 HOSP IP/OBS DSCHRG MGMT 30/<: CPT

## 2023-01-01 PROCEDURE — 86738 MYCOPLASMA ANTIBODY: CPT | Performed by: INTERNAL MEDICINE

## 2023-01-01 PROCEDURE — 96372 THER/PROPH/DIAG INJ SC/IM: CPT | Performed by: STUDENT IN AN ORGANIZED HEALTH CARE EDUCATION/TRAINING PROGRAM

## 2023-01-01 PROCEDURE — 85025 COMPLETE CBC W/AUTO DIFF WBC: CPT

## 2023-01-01 PROCEDURE — 70551 MRI BRAIN STEM W/O DYE: CPT

## 2023-01-01 PROCEDURE — 96375 TX/PRO/DX INJ NEW DRUG ADDON: CPT

## 2023-01-01 PROCEDURE — 83735 ASSAY OF MAGNESIUM: CPT

## 2023-01-01 PROCEDURE — 82607 VITAMIN B-12: CPT | Mod: PARLAB

## 2023-01-01 PROCEDURE — 87205 SMEAR GRAM STAIN: CPT | Mod: PARLAB

## 2023-01-01 PROCEDURE — 71045 X-RAY EXAM CHEST 1 VIEW: CPT

## 2023-01-01 PROCEDURE — 99232 SBSQ HOSP IP/OBS MODERATE 35: CPT | Performed by: NURSE PRACTITIONER

## 2023-01-01 PROCEDURE — 2500000001 HC RX 250 WO HCPCS SELF ADMINISTERED DRUGS (ALT 637 FOR MEDICARE OP): Performed by: PODIATRIST

## 2023-01-01 PROCEDURE — 92610 EVALUATE SWALLOWING FUNCTION: CPT | Mod: GN

## 2023-01-01 PROCEDURE — 80162 ASSAY OF DIGOXIN TOTAL: CPT

## 2023-01-01 PROCEDURE — 96367 TX/PROPH/DG ADDL SEQ IV INF: CPT

## 2023-01-01 PROCEDURE — 99285 EMERGENCY DEPT VISIT HI MDM: CPT | Performed by: EMERGENCY MEDICINE

## 2023-01-01 PROCEDURE — 96366 THER/PROPH/DIAG IV INF ADDON: CPT

## 2023-01-01 PROCEDURE — 87636 SARSCOV2 & INF A&B AMP PRB: CPT | Performed by: EMERGENCY MEDICINE

## 2023-01-01 PROCEDURE — 84132 ASSAY OF SERUM POTASSIUM: CPT

## 2023-01-01 PROCEDURE — 97530 THERAPEUTIC ACTIVITIES: CPT | Mod: GP

## 2023-01-01 PROCEDURE — 84425 ASSAY OF VITAMIN B-1: CPT | Performed by: INTERNAL MEDICINE

## 2023-01-01 PROCEDURE — 85730 THROMBOPLASTIN TIME PARTIAL: CPT | Performed by: EMERGENCY MEDICINE

## 2023-01-01 PROCEDURE — 97165 OT EVAL LOW COMPLEX 30 MIN: CPT | Mod: GO

## 2023-01-01 PROCEDURE — 96372 THER/PROPH/DIAG INJ SC/IM: CPT

## 2023-01-01 PROCEDURE — 36415 COLL VENOUS BLD VENIPUNCTURE: CPT | Performed by: INTERNAL MEDICINE

## 2023-01-01 PROCEDURE — 97535 SELF CARE MNGMENT TRAINING: CPT | Mod: CO,GO

## 2023-01-01 PROCEDURE — 71045 X-RAY EXAM CHEST 1 VIEW: CPT | Performed by: RADIOLOGY

## 2023-01-01 PROCEDURE — 70551 MRI BRAIN STEM W/O DYE: CPT | Performed by: STUDENT IN AN ORGANIZED HEALTH CARE EDUCATION/TRAINING PROGRAM

## 2023-01-01 PROCEDURE — 84443 ASSAY THYROID STIM HORMONE: CPT

## 2023-01-01 PROCEDURE — 85025 COMPLETE CBC W/AUTO DIFF WBC: CPT | Performed by: EMERGENCY MEDICINE

## 2023-01-01 PROCEDURE — 76770 US EXAM ABDO BACK WALL COMP: CPT | Mod: FOREIGN READ | Performed by: RADIOLOGY

## 2023-01-01 PROCEDURE — 2060000001 HC INTERMEDIATE ICU ROOM DAILY

## 2023-01-01 PROCEDURE — 2500000004 HC RX 250 GENERAL PHARMACY W/ HCPCS (ALT 636 FOR OP/ED): Performed by: INTERNAL MEDICINE

## 2023-01-01 PROCEDURE — 99233 SBSQ HOSP IP/OBS HIGH 50: CPT

## 2023-01-01 PROCEDURE — 81001 URINALYSIS AUTO W/SCOPE: CPT | Performed by: EMERGENCY MEDICINE

## 2023-01-01 PROCEDURE — 71046 X-RAY EXAM CHEST 2 VIEWS: CPT | Mod: FY

## 2023-01-01 PROCEDURE — 2500000005 HC RX 250 GENERAL PHARMACY W/O HCPCS: Performed by: INTERNAL MEDICINE

## 2023-01-01 PROCEDURE — 84145 PROCALCITONIN (PCT): CPT | Mod: PARLAB

## 2023-01-01 PROCEDURE — 86850 RBC ANTIBODY SCREEN: CPT | Performed by: EMERGENCY MEDICINE

## 2023-01-01 PROCEDURE — 70450 CT HEAD/BRAIN W/O DYE: CPT

## 2023-01-01 PROCEDURE — 87640 STAPH A DNA AMP PROBE: CPT | Performed by: EMERGENCY MEDICINE

## 2023-01-01 PROCEDURE — 96365 THER/PROPH/DIAG IV INF INIT: CPT

## 2023-01-01 PROCEDURE — 99223 1ST HOSP IP/OBS HIGH 75: CPT | Performed by: PSYCHIATRY & NEUROLOGY

## 2023-01-01 PROCEDURE — 76770 US EXAM ABDO BACK WALL COMP: CPT

## 2023-01-01 PROCEDURE — 99233 SBSQ HOSP IP/OBS HIGH 50: CPT | Performed by: INTERNAL MEDICINE

## 2023-01-01 PROCEDURE — 97166 OT EVAL MOD COMPLEX 45 MIN: CPT | Mod: GO

## 2023-01-01 PROCEDURE — 97535 SELF CARE MNGMENT TRAINING: CPT | Mod: GP,CQ

## 2023-01-01 PROCEDURE — 71045 X-RAY EXAM CHEST 1 VIEW: CPT | Mod: FY,FR

## 2023-01-01 PROCEDURE — 97116 GAIT TRAINING THERAPY: CPT | Mod: CQ,GP

## 2023-01-01 PROCEDURE — 94762 N-INVAS EAR/PLS OXIMTRY CONT: CPT

## 2023-01-01 PROCEDURE — 99232 SBSQ HOSP IP/OBS MODERATE 35: CPT | Performed by: PODIATRIST

## 2023-01-01 PROCEDURE — 36600 WITHDRAWAL OF ARTERIAL BLOOD: CPT

## 2023-01-01 PROCEDURE — 94667 MNPJ CHEST WALL 1ST: CPT

## 2023-01-01 PROCEDURE — 70450 CT HEAD/BRAIN W/O DYE: CPT | Performed by: RADIOLOGY

## 2023-01-01 PROCEDURE — 99285 EMERGENCY DEPT VISIT HI MDM: CPT | Mod: 25 | Performed by: EMERGENCY MEDICINE

## 2023-01-01 PROCEDURE — 5A09457 ASSISTANCE WITH RESPIRATORY VENTILATION, 24-96 CONSECUTIVE HOURS, CONTINUOUS POSITIVE AIRWAY PRESSURE: ICD-10-PCS | Performed by: STUDENT IN AN ORGANIZED HEALTH CARE EDUCATION/TRAINING PROGRAM

## 2023-01-01 PROCEDURE — 99222 1ST HOSP IP/OBS MODERATE 55: CPT | Performed by: STUDENT IN AN ORGANIZED HEALTH CARE EDUCATION/TRAINING PROGRAM

## 2023-01-01 PROCEDURE — 99221 1ST HOSP IP/OBS SF/LOW 40: CPT | Performed by: PODIATRIST

## 2023-01-01 PROCEDURE — 82374 ASSAY BLOOD CARBON DIOXIDE: CPT

## 2023-01-01 PROCEDURE — 80069 RENAL FUNCTION PANEL: CPT

## 2023-01-01 PROCEDURE — 87075 CULTR BACTERIA EXCEPT BLOOD: CPT | Mod: PARLAB

## 2023-01-01 PROCEDURE — 83605 ASSAY OF LACTIC ACID: CPT | Performed by: EMERGENCY MEDICINE

## 2023-01-01 PROCEDURE — 96376 TX/PRO/DX INJ SAME DRUG ADON: CPT

## 2023-01-01 PROCEDURE — 71275 CT ANGIOGRAPHY CHEST: CPT

## 2023-01-01 PROCEDURE — 1150000001 HC HOSPICE PRIVATE ROOM DAILY

## 2023-01-01 PROCEDURE — 2550000001 HC RX 255 CONTRASTS: Performed by: EMERGENCY MEDICINE

## 2023-01-01 RX ORDER — MAGNESIUM SULFATE HEPTAHYDRATE 40 MG/ML
4 INJECTION, SOLUTION INTRAVENOUS ONCE
Status: COMPLETED | OUTPATIENT
Start: 2023-01-01 | End: 2023-01-01

## 2023-01-01 RX ORDER — CEFTRIAXONE 1 G/50ML
1 INJECTION, SOLUTION INTRAVENOUS ONCE
Status: COMPLETED | OUTPATIENT
Start: 2023-01-01 | End: 2023-01-01

## 2023-01-01 RX ORDER — FUROSEMIDE 40 MG/1
40 TABLET ORAL DAILY
Status: DISCONTINUED | OUTPATIENT
Start: 2023-01-01 | End: 2023-01-01 | Stop reason: HOSPADM

## 2023-01-01 RX ORDER — IPRATROPIUM BROMIDE AND ALBUTEROL SULFATE 2.5; .5 MG/3ML; MG/3ML
3 SOLUTION RESPIRATORY (INHALATION) EVERY 20 MIN
Status: COMPLETED | OUTPATIENT
Start: 2023-01-01 | End: 2023-01-01

## 2023-01-01 RX ORDER — MORPHINE SULFATE 2 MG/ML
2 INJECTION, SOLUTION INTRAMUSCULAR; INTRAVENOUS
Status: DISCONTINUED | OUTPATIENT
Start: 2023-01-01 | End: 2023-01-01 | Stop reason: HOSPADM

## 2023-01-01 RX ORDER — GUAIFENESIN 600 MG/1
600 TABLET, EXTENDED RELEASE ORAL 2 TIMES DAILY
Status: DISCONTINUED | OUTPATIENT
Start: 2023-01-01 | End: 2023-01-01 | Stop reason: HOSPADM

## 2023-01-01 RX ORDER — MORPHINE SULFATE IN 0.9 % NACL 30 MG/30ML
PATIENT CONTROLLED ANALGESIA SYRINGE INTRAVENOUS CONTINUOUS
Status: DISCONTINUED | OUTPATIENT
Start: 2023-01-01 | End: 2023-01-01 | Stop reason: HOSPADM

## 2023-01-01 RX ORDER — DILTIAZEM HYDROCHLORIDE 120 MG/1
1 CAPSULE, EXTENDED RELEASE ORAL DAILY
COMMUNITY
Start: 2015-01-20 | End: 2023-01-01 | Stop reason: HOSPADM

## 2023-01-01 RX ORDER — HYDROCORTISONE 1 %
CREAM (GRAM) TOPICAL 2 TIMES DAILY
Status: DISCONTINUED | OUTPATIENT
Start: 2023-01-01 | End: 2023-01-01 | Stop reason: HOSPADM

## 2023-01-01 RX ORDER — FUROSEMIDE 10 MG/ML
40 INJECTION INTRAMUSCULAR; INTRAVENOUS ONCE
Status: COMPLETED | OUTPATIENT
Start: 2023-01-01 | End: 2023-01-01

## 2023-01-01 RX ORDER — ATORVASTATIN CALCIUM 10 MG/1
10 TABLET, FILM COATED ORAL DAILY
COMMUNITY
Start: 2023-01-01 | End: 2023-01-01 | Stop reason: HOSPADM

## 2023-01-01 RX ORDER — POTASSIUM CHLORIDE 20 MEQ/1
20 TABLET, EXTENDED RELEASE ORAL ONCE
Status: DISCONTINUED | OUTPATIENT
Start: 2023-01-01 | End: 2023-01-01

## 2023-01-01 RX ORDER — IPRATROPIUM BROMIDE AND ALBUTEROL SULFATE 2.5; .5 MG/3ML; MG/3ML
3 SOLUTION RESPIRATORY (INHALATION) EVERY 2 HOUR PRN
Status: DISCONTINUED | OUTPATIENT
Start: 2023-01-01 | End: 2023-01-01 | Stop reason: HOSPADM

## 2023-01-01 RX ORDER — HALOPERIDOL 5 MG/ML
1 INJECTION INTRAMUSCULAR EVERY 4 HOURS PRN
Status: DISCONTINUED | OUTPATIENT
Start: 2023-01-01 | End: 2023-01-01 | Stop reason: HOSPADM

## 2023-01-01 RX ORDER — DAPAGLIFLOZIN 10 MG/1
10 TABLET, FILM COATED ORAL DAILY
Status: DISCONTINUED | OUTPATIENT
Start: 2023-01-01 | End: 2023-01-01 | Stop reason: HOSPADM

## 2023-01-01 RX ORDER — ACETAMINOPHEN 325 MG/1
650 TABLET ORAL EVERY 4 HOURS PRN
Status: DISCONTINUED | OUTPATIENT
Start: 2023-01-01 | End: 2023-01-01 | Stop reason: HOSPADM

## 2023-01-01 RX ORDER — LORAZEPAM 2 MG/ML
1 INJECTION INTRAMUSCULAR
Status: DISCONTINUED | OUTPATIENT
Start: 2023-01-01 | End: 2023-01-01 | Stop reason: HOSPADM

## 2023-01-01 RX ORDER — IPRATROPIUM BROMIDE AND ALBUTEROL SULFATE 2.5; .5 MG/3ML; MG/3ML
3 SOLUTION RESPIRATORY (INHALATION) EVERY 4 HOURS PRN
Status: DISCONTINUED | OUTPATIENT
Start: 2023-01-01 | End: 2023-01-01

## 2023-01-01 RX ORDER — POTASSIUM CHLORIDE 14.9 MG/ML
20 INJECTION INTRAVENOUS ONCE
Status: DISCONTINUED | OUTPATIENT
Start: 2023-01-01 | End: 2023-01-01

## 2023-01-01 RX ORDER — FUROSEMIDE 80 MG/1
80 TABLET ORAL DAILY
COMMUNITY
Start: 2021-12-02 | End: 2023-01-01 | Stop reason: HOSPADM

## 2023-01-01 RX ORDER — DIGOXIN 125 MCG
125 TABLET ORAL DAILY
Status: DISCONTINUED | OUTPATIENT
Start: 2023-01-01 | End: 2023-01-01

## 2023-01-01 RX ORDER — HYDROXYZINE HYDROCHLORIDE 25 MG/1
25 TABLET, FILM COATED ORAL ONCE
Status: DISCONTINUED | OUTPATIENT
Start: 2023-01-01 | End: 2023-01-01 | Stop reason: HOSPADM

## 2023-01-01 RX ORDER — ALBUTEROL SULFATE 90 UG/1
2 AEROSOL, METERED RESPIRATORY (INHALATION) EVERY 2 HOUR PRN
Status: DISCONTINUED | OUTPATIENT
Start: 2023-01-01 | End: 2023-01-01 | Stop reason: HOSPADM

## 2023-01-01 RX ORDER — MAGNESIUM SULFATE HEPTAHYDRATE 40 MG/ML
2 INJECTION, SOLUTION INTRAVENOUS ONCE
Status: COMPLETED | OUTPATIENT
Start: 2023-01-01 | End: 2023-01-01

## 2023-01-01 RX ORDER — IPRATROPIUM BROMIDE AND ALBUTEROL SULFATE 2.5; .5 MG/3ML; MG/3ML
3 SOLUTION RESPIRATORY (INHALATION) EVERY 6 HOURS PRN
Status: DISCONTINUED | OUTPATIENT
Start: 2023-01-01 | End: 2023-01-01

## 2023-01-01 RX ORDER — AMMONIUM LACTATE 12 G/100G
1 LOTION TOPICAL DAILY
Status: DISCONTINUED | OUTPATIENT
Start: 2023-01-01 | End: 2023-01-01 | Stop reason: HOSPADM

## 2023-01-01 RX ORDER — DICLOFENAC SODIUM 10 MG/G
4 GEL TOPICAL 4 TIMES DAILY PRN
Status: DISCONTINUED | OUTPATIENT
Start: 2023-01-01 | End: 2023-01-01 | Stop reason: HOSPADM

## 2023-01-01 RX ORDER — SERTRALINE HYDROCHLORIDE 50 MG/1
50 TABLET, FILM COATED ORAL DAILY
COMMUNITY
Start: 2023-01-01 | End: 2023-01-01 | Stop reason: HOSPADM

## 2023-01-01 RX ORDER — LEVOFLOXACIN 750 MG/1
750 TABLET ORAL DAILY
COMMUNITY
Start: 2023-01-01 | End: 2023-01-01

## 2023-01-01 RX ORDER — DIPHENHYDRAMINE HYDROCHLORIDE 50 MG/ML
25 INJECTION INTRAMUSCULAR; INTRAVENOUS ONCE
Status: COMPLETED | OUTPATIENT
Start: 2023-01-01 | End: 2023-01-01

## 2023-01-01 RX ORDER — HYDROXYZINE HYDROCHLORIDE 25 MG/1
25 TABLET, FILM COATED ORAL ONCE
Status: COMPLETED | OUTPATIENT
Start: 2023-01-01 | End: 2023-01-01

## 2023-01-01 RX ORDER — FUROSEMIDE 40 MG/1
40 TABLET ORAL DAILY
Qty: 30 TABLET | Refills: 0 | Status: SHIPPED | OUTPATIENT
Start: 2023-01-01 | End: 2023-01-01 | Stop reason: HOSPADM

## 2023-01-01 RX ORDER — PANTOPRAZOLE SODIUM 40 MG/1
40 TABLET, DELAYED RELEASE ORAL
Status: DISCONTINUED | OUTPATIENT
Start: 2023-01-01 | End: 2023-01-01 | Stop reason: HOSPADM

## 2023-01-01 RX ORDER — TIOTROPIUM BROMIDE AND OLODATEROL 3.124; 2.736 UG/1; UG/1
2 SPRAY, METERED RESPIRATORY (INHALATION) DAILY
COMMUNITY
Start: 2023-01-01 | End: 2023-01-01 | Stop reason: ENTERED-IN-ERROR

## 2023-01-01 RX ORDER — POTASSIUM CHLORIDE 20 MEQ/1
40 TABLET, EXTENDED RELEASE ORAL ONCE
Status: DISCONTINUED | OUTPATIENT
Start: 2023-01-01 | End: 2023-01-01

## 2023-01-01 RX ORDER — OXYBUTYNIN CHLORIDE 5 MG/1
5 TABLET ORAL 2 TIMES DAILY
Status: DISCONTINUED | OUTPATIENT
Start: 2023-01-01 | End: 2023-01-01 | Stop reason: HOSPADM

## 2023-01-01 RX ORDER — PREDNISONE 20 MG/1
40 TABLET ORAL ONCE
Status: COMPLETED | OUTPATIENT
Start: 2023-01-01 | End: 2023-01-01

## 2023-01-01 RX ORDER — RISEDRONATE SODIUM 35 MG/1
1 TABLET, FILM COATED ORAL
COMMUNITY
Start: 2021-08-05 | End: 2023-01-01 | Stop reason: ENTERED-IN-ERROR

## 2023-01-01 RX ORDER — IPRATROPIUM BROMIDE AND ALBUTEROL SULFATE 2.5; .5 MG/3ML; MG/3ML
SOLUTION RESPIRATORY (INHALATION)
Status: DISPENSED
Start: 2023-01-01 | End: 2023-01-01

## 2023-01-01 RX ORDER — GLYCOPYRROLATE 0.2 MG/ML
0.2 INJECTION INTRAMUSCULAR; INTRAVENOUS EVERY 4 HOURS PRN
Status: DISCONTINUED | OUTPATIENT
Start: 2023-01-01 | End: 2023-01-01 | Stop reason: HOSPADM

## 2023-01-01 RX ORDER — ATORVASTATIN CALCIUM 10 MG/1
10 TABLET, FILM COATED ORAL DAILY
Status: DISCONTINUED | OUTPATIENT
Start: 2023-01-01 | End: 2023-01-01 | Stop reason: HOSPADM

## 2023-01-01 RX ORDER — DOCUSATE SODIUM 100 MG/1
100 CAPSULE, LIQUID FILLED ORAL 2 TIMES DAILY
Status: DISCONTINUED | OUTPATIENT
Start: 2023-01-01 | End: 2023-01-01 | Stop reason: HOSPADM

## 2023-01-01 RX ORDER — POLYETHYLENE GLYCOL 3350 17 G/17G
17 POWDER, FOR SOLUTION ORAL DAILY
Status: DISCONTINUED | OUTPATIENT
Start: 2023-01-01 | End: 2023-01-01 | Stop reason: HOSPADM

## 2023-01-01 RX ORDER — FORMOTEROL FUMARATE DIHYDRATE 20 UG/2ML
20 SOLUTION RESPIRATORY (INHALATION)
Status: DISCONTINUED | OUTPATIENT
Start: 2023-01-01 | End: 2023-01-01

## 2023-01-01 RX ORDER — HALOPERIDOL 5 MG/ML
1 INJECTION INTRAMUSCULAR ONCE
Status: COMPLETED | OUTPATIENT
Start: 2023-01-01 | End: 2023-01-01

## 2023-01-01 RX ORDER — DILTIAZEM HYDROCHLORIDE 120 MG/1
120 CAPSULE, COATED, EXTENDED RELEASE ORAL DAILY
Status: DISCONTINUED | OUTPATIENT
Start: 2023-01-01 | End: 2023-01-01 | Stop reason: HOSPADM

## 2023-01-01 RX ORDER — OXYBUTYNIN CHLORIDE 10 MG/1
10 TABLET, EXTENDED RELEASE ORAL DAILY
COMMUNITY
Start: 2022-01-01 | End: 2023-01-01 | Stop reason: HOSPADM

## 2023-01-01 RX ORDER — IPRATROPIUM BROMIDE AND ALBUTEROL SULFATE 2.5; .5 MG/3ML; MG/3ML
3 SOLUTION RESPIRATORY (INHALATION)
Status: DISCONTINUED | OUTPATIENT
Start: 2023-01-01 | End: 2023-01-01

## 2023-01-01 RX ORDER — VANCOMYCIN HYDROCHLORIDE 1 G/200ML
1 INJECTION, SOLUTION INTRAVENOUS ONCE
Status: COMPLETED | OUTPATIENT
Start: 2023-01-01 | End: 2023-01-01

## 2023-01-01 RX ORDER — SODIUM CHLORIDE FOR INHALATION 3 %
3 VIAL, NEBULIZER (ML) INHALATION
Status: DISCONTINUED | OUTPATIENT
Start: 2023-01-01 | End: 2023-01-01 | Stop reason: HOSPADM

## 2023-01-01 RX ORDER — OXYBUTYNIN CHLORIDE 5 MG/1
10 TABLET ORAL NIGHTLY
Status: DISCONTINUED | OUTPATIENT
Start: 2023-01-01 | End: 2023-01-01 | Stop reason: HOSPADM

## 2023-01-01 RX ORDER — SERTRALINE HYDROCHLORIDE 50 MG/1
50 TABLET, FILM COATED ORAL DAILY
Status: DISCONTINUED | OUTPATIENT
Start: 2023-01-01 | End: 2023-01-01 | Stop reason: HOSPADM

## 2023-01-01 RX ORDER — IPRATROPIUM BROMIDE AND ALBUTEROL SULFATE 2.5; .5 MG/3ML; MG/3ML
3 SOLUTION RESPIRATORY (INHALATION)
COMMUNITY
End: 2023-01-01 | Stop reason: HOSPADM

## 2023-01-01 RX ORDER — UMECLIDINIUM BROMIDE AND VILANTEROL TRIFENATATE 62.5; 25 UG/1; UG/1
1 POWDER RESPIRATORY (INHALATION) DAILY
COMMUNITY
End: 2023-01-01 | Stop reason: HOSPADM

## 2023-01-01 RX ORDER — ALBUTEROL SULFATE 90 UG/1
2 AEROSOL, METERED RESPIRATORY (INHALATION) EVERY 4 HOURS PRN
COMMUNITY
Start: 2023-01-01 | End: 2023-01-01 | Stop reason: HOSPADM

## 2023-01-01 RX ORDER — SODIUM CHLORIDE, SODIUM LACTATE, POTASSIUM CHLORIDE, CALCIUM CHLORIDE 600; 310; 30; 20 MG/100ML; MG/100ML; MG/100ML; MG/100ML
50 INJECTION, SOLUTION INTRAVENOUS CONTINUOUS
Status: ACTIVE | OUTPATIENT
Start: 2023-01-01 | End: 2023-01-01

## 2023-01-01 RX ORDER — ATORVASTATIN CALCIUM 10 MG/1
10 TABLET, FILM COATED ORAL NIGHTLY
Status: DISCONTINUED | OUTPATIENT
Start: 2023-01-01 | End: 2023-01-01 | Stop reason: HOSPADM

## 2023-01-01 RX ORDER — SODIUM CHLORIDE FOR INHALATION 3 %
3 VIAL, NEBULIZER (ML) INHALATION EVERY 6 HOURS SCHEDULED
Status: DISCONTINUED | OUTPATIENT
Start: 2023-01-01 | End: 2023-01-01

## 2023-01-01 RX ORDER — KETOROLAC TROMETHAMINE 30 MG/ML
15 INJECTION, SOLUTION INTRAMUSCULAR; INTRAVENOUS EVERY 6 HOURS PRN
Status: DISCONTINUED | OUTPATIENT
Start: 2023-01-01 | End: 2023-01-01 | Stop reason: HOSPADM

## 2023-01-01 RX ORDER — FLUTICASONE PROPIONATE 50 MCG
2 SPRAY, SUSPENSION (ML) NASAL DAILY
Status: DISCONTINUED | OUTPATIENT
Start: 2023-01-01 | End: 2023-01-01 | Stop reason: HOSPADM

## 2023-01-01 RX ORDER — DIGOXIN 125 MCG
125 TABLET ORAL DAILY
COMMUNITY
Start: 2015-12-17 | End: 2023-01-01 | Stop reason: HOSPADM

## 2023-01-01 RX ORDER — OMEPRAZOLE 40 MG/1
1 CAPSULE, DELAYED RELEASE ORAL DAILY
COMMUNITY
Start: 2022-01-01 | End: 2023-01-01 | Stop reason: HOSPADM

## 2023-01-01 RX ORDER — UBIDECARENONE 75 MG
500 CAPSULE ORAL DAILY
COMMUNITY
End: 2023-01-01 | Stop reason: HOSPADM

## 2023-01-01 RX ORDER — DILTIAZEM HYDROCHLORIDE 120 MG/1
120 CAPSULE, EXTENDED RELEASE ORAL DAILY
Qty: 30 CAPSULE | Refills: 0 | Status: SHIPPED | OUTPATIENT
Start: 2023-01-01 | End: 2023-01-01 | Stop reason: HOSPADM

## 2023-01-01 RX ORDER — IPRATROPIUM BROMIDE AND ALBUTEROL SULFATE 2.5; .5 MG/3ML; MG/3ML
3 SOLUTION RESPIRATORY (INHALATION) ONCE
Status: COMPLETED | OUTPATIENT
Start: 2023-01-01 | End: 2023-01-01

## 2023-01-01 RX ORDER — CEFTRIAXONE 1 G/50ML
1 INJECTION, SOLUTION INTRAVENOUS EVERY 24 HOURS
Status: DISCONTINUED | OUTPATIENT
Start: 2023-01-01 | End: 2023-01-01 | Stop reason: HOSPADM

## 2023-01-01 RX ORDER — QUETIAPINE FUMARATE 25 MG/1
25 TABLET, FILM COATED ORAL 2 TIMES DAILY PRN
Status: DISCONTINUED | OUTPATIENT
Start: 2023-01-01 | End: 2023-01-01 | Stop reason: HOSPADM

## 2023-01-01 RX ORDER — POLYETHYLENE GLYCOL 3350 17 G/17G
17 POWDER, FOR SOLUTION ORAL DAILY
COMMUNITY
End: 2023-01-01 | Stop reason: HOSPADM

## 2023-01-01 RX ORDER — POTASSIUM CHLORIDE 20 MEQ/1
40 TABLET, EXTENDED RELEASE ORAL ONCE
Status: COMPLETED | OUTPATIENT
Start: 2023-01-01 | End: 2023-01-01

## 2023-01-01 RX ORDER — HALOPERIDOL 5 MG/ML
2 INJECTION INTRAMUSCULAR ONCE
Status: COMPLETED | OUTPATIENT
Start: 2023-01-01 | End: 2023-01-01

## 2023-01-01 RX ORDER — FUROSEMIDE 10 MG/ML
40 INJECTION INTRAMUSCULAR; INTRAVENOUS EVERY 12 HOURS
Status: DISCONTINUED | OUTPATIENT
Start: 2023-01-01 | End: 2023-01-01

## 2023-01-01 RX ORDER — LOSARTAN POTASSIUM 25 MG/1
0.5 TABLET ORAL DAILY
COMMUNITY
Start: 2016-06-09 | End: 2023-01-01 | Stop reason: HOSPADM

## 2023-01-01 RX ORDER — BALSAM PERU/CASTOR OIL
OINTMENT (GRAM) TOPICAL 2 TIMES DAILY
Status: DISCONTINUED | OUTPATIENT
Start: 2023-01-01 | End: 2023-01-01 | Stop reason: HOSPADM

## 2023-01-01 RX ORDER — DAPAGLIFLOZIN 10 MG/1
10 TABLET, FILM COATED ORAL DAILY
Qty: 30 TABLET | Refills: 0 | Status: SHIPPED | OUTPATIENT
Start: 2023-01-01 | End: 2023-01-01 | Stop reason: HOSPADM

## 2023-01-01 RX ORDER — ACETYLCYSTEINE 100 MG/ML
6 SOLUTION ORAL; RESPIRATORY (INHALATION) 4 TIMES DAILY
Status: DISCONTINUED | OUTPATIENT
Start: 2023-01-01 | End: 2023-01-01

## 2023-01-01 RX ORDER — DICLOFENAC SODIUM 10 MG/G
4 GEL TOPICAL 4 TIMES DAILY PRN
Qty: 4 G | Refills: 0 | Status: SHIPPED | OUTPATIENT
Start: 2023-01-01 | End: 2023-01-01 | Stop reason: HOSPADM

## 2023-01-01 RX ORDER — FUROSEMIDE 40 MG/1
40 TABLET ORAL
Status: DISCONTINUED | OUTPATIENT
Start: 2023-01-01 | End: 2023-01-01

## 2023-01-01 RX ORDER — POTASSIUM CHLORIDE 1.5 G/1.58G
40 POWDER, FOR SOLUTION ORAL ONCE
Status: COMPLETED | OUTPATIENT
Start: 2023-01-01 | End: 2023-01-01

## 2023-01-01 RX ORDER — METHYLPREDNISOLONE 4 MG/1
20 TABLET ORAL DAILY
Status: DISCONTINUED | OUTPATIENT
Start: 2023-01-01 | End: 2023-01-01

## 2023-01-01 RX ORDER — POTASSIUM CHLORIDE 14.9 MG/ML
20 INJECTION INTRAVENOUS
Status: COMPLETED | OUTPATIENT
Start: 2023-01-01 | End: 2023-01-01

## 2023-01-01 RX ORDER — LOSARTAN POTASSIUM 25 MG/1
12.5 TABLET ORAL DAILY
Status: DISCONTINUED | OUTPATIENT
Start: 2023-01-01 | End: 2023-01-01 | Stop reason: HOSPADM

## 2023-01-01 RX ORDER — ACETAMINOPHEN 650 MG/1
650 SUPPOSITORY RECTAL EVERY 4 HOURS PRN
Status: DISCONTINUED | OUTPATIENT
Start: 2023-01-01 | End: 2023-01-01 | Stop reason: HOSPADM

## 2023-01-01 RX ORDER — IPRATROPIUM BROMIDE AND ALBUTEROL SULFATE 2.5; .5 MG/3ML; MG/3ML
3 SOLUTION RESPIRATORY (INHALATION)
Status: DISCONTINUED | OUTPATIENT
Start: 2023-01-01 | End: 2023-01-01 | Stop reason: HOSPADM

## 2023-01-01 RX ORDER — IPRATROPIUM BROMIDE AND ALBUTEROL SULFATE 2.5; .5 MG/3ML; MG/3ML
3 SOLUTION RESPIRATORY (INHALATION) EVERY 6 HOURS PRN
COMMUNITY
Start: 2023-01-01 | End: 2023-01-01 | Stop reason: HOSPADM

## 2023-01-01 RX ORDER — ALBUTEROL SULFATE 0.83 MG/ML
2.5 SOLUTION RESPIRATORY (INHALATION)
Status: DISCONTINUED | OUTPATIENT
Start: 2023-01-01 | End: 2023-01-01 | Stop reason: HOSPADM

## 2023-01-01 RX ORDER — FLUTICASONE PROPIONATE 50 MCG
2 SPRAY, SUSPENSION (ML) NASAL DAILY PRN
COMMUNITY
Start: 2020-04-27 | End: 2023-01-01 | Stop reason: HOSPADM

## 2023-01-01 RX ORDER — FORMOTEROL FUMARATE DIHYDRATE 20 UG/2ML
20 SOLUTION RESPIRATORY (INHALATION)
Status: DISCONTINUED | OUTPATIENT
Start: 2023-01-01 | End: 2023-01-01 | Stop reason: HOSPADM

## 2023-01-01 RX ORDER — POTASSIUM CHLORIDE 20 MEQ/1
40 TABLET, EXTENDED RELEASE ORAL 2 TIMES DAILY
COMMUNITY
Start: 2023-01-01 | End: 2023-01-01 | Stop reason: HOSPADM

## 2023-01-01 RX ORDER — POTASSIUM CHLORIDE 14.9 MG/ML
20 INJECTION INTRAVENOUS ONCE
Status: COMPLETED | OUTPATIENT
Start: 2023-01-01 | End: 2023-01-01

## 2023-01-01 RX ORDER — ALBUTEROL SULFATE 90 UG/1
2 AEROSOL, METERED RESPIRATORY (INHALATION) EVERY 4 HOURS PRN
Status: DISCONTINUED | OUTPATIENT
Start: 2023-01-01 | End: 2023-01-01

## 2023-01-01 RX ORDER — LORAZEPAM 2 MG/ML
0.5 INJECTION INTRAMUSCULAR EVERY 4 HOURS PRN
Status: DISCONTINUED | OUTPATIENT
Start: 2023-01-01 | End: 2023-01-01 | Stop reason: HOSPADM

## 2023-01-01 RX ORDER — ACETAMINOPHEN 325 MG/1
650 TABLET ORAL EVERY 6 HOURS PRN
Status: DISCONTINUED | OUTPATIENT
Start: 2023-01-01 | End: 2023-01-01 | Stop reason: HOSPADM

## 2023-01-01 RX ORDER — POLYETHYLENE GLYCOL 3350 17 G/17G
17 POWDER, FOR SOLUTION ORAL DAILY PRN
Status: DISCONTINUED | OUTPATIENT
Start: 2023-01-01 | End: 2023-01-01

## 2023-01-01 RX ORDER — PREDNISONE 20 MG/1
20 TABLET ORAL DAILY
Status: COMPLETED | OUTPATIENT
Start: 2023-01-01 | End: 2023-01-01

## 2023-01-01 RX ORDER — ACETAMINOPHEN 500 MG
1 TABLET ORAL DAILY
COMMUNITY
End: 2023-01-01 | Stop reason: HOSPADM

## 2023-01-01 RX ADMIN — HYDROMORPHONE HYDROCHLORIDE 0.2 MG: 1 INJECTION, SOLUTION INTRAMUSCULAR; INTRAVENOUS; SUBCUTANEOUS at 01:03

## 2023-01-01 RX ADMIN — DILTIAZEM HYDROCHLORIDE 120 MG: 120 CAPSULE, COATED, EXTENDED RELEASE ORAL at 08:51

## 2023-01-01 RX ADMIN — IPRATROPIUM BROMIDE AND ALBUTEROL SULFATE 3 ML: .5; 3 SOLUTION RESPIRATORY (INHALATION) at 09:52

## 2023-01-01 RX ADMIN — FLUTICASONE PROPIONATE 2 SPRAY: 50 SPRAY, METERED NASAL at 10:39

## 2023-01-01 RX ADMIN — ALBUTEROL SULFATE 2.5 MG: 2.5 SOLUTION RESPIRATORY (INHALATION) at 19:33

## 2023-01-01 RX ADMIN — AZITHROMYCIN 500 MG: 500 INJECTION, POWDER, LYOPHILIZED, FOR SOLUTION INTRAVENOUS at 17:00

## 2023-01-01 RX ADMIN — PANTOPRAZOLE SODIUM 40 MG: 40 TABLET, DELAYED RELEASE ORAL at 06:19

## 2023-01-01 RX ADMIN — IPRATROPIUM BROMIDE AND ALBUTEROL SULFATE 3 ML: 2.5; .5 SOLUTION RESPIRATORY (INHALATION) at 06:56

## 2023-01-01 RX ADMIN — DIGOXIN 125 MCG: 125 TABLET ORAL at 17:45

## 2023-01-01 RX ADMIN — PREDNISONE 20 MG: 20 TABLET ORAL at 09:01

## 2023-01-01 RX ADMIN — METHYLPREDNISOLONE SODIUM SUCCINATE 40 MG: 40 INJECTION INTRAMUSCULAR; INTRAVENOUS at 21:32

## 2023-01-01 RX ADMIN — SODIUM CHLORIDE SOLN NEBU 3% 3 ML: 3 NEBU SOLN at 07:10

## 2023-01-01 RX ADMIN — DILTIAZEM HYDROCHLORIDE 120 MG: 120 CAPSULE, COATED, EXTENDED RELEASE ORAL at 09:08

## 2023-01-01 RX ADMIN — POLYETHYLENE GLYCOL 3350 17 G: 17 POWDER, FOR SOLUTION ORAL at 10:40

## 2023-01-01 RX ADMIN — ATORVASTATIN CALCIUM 10 MG: 10 TABLET, FILM COATED ORAL at 09:00

## 2023-01-01 RX ADMIN — SERTRALINE HYDROCHLORIDE 50 MG: 50 TABLET ORAL at 10:14

## 2023-01-01 RX ADMIN — MORPHINE SULFATE 2 MG: 2 INJECTION, SOLUTION INTRAMUSCULAR; INTRAVENOUS at 17:17

## 2023-01-01 RX ADMIN — APIXABAN 5 MG: 5 TABLET, FILM COATED ORAL at 21:32

## 2023-01-01 RX ADMIN — IPRATROPIUM BROMIDE AND ALBUTEROL SULFATE 3 ML: 2.5; .5 SOLUTION RESPIRATORY (INHALATION) at 15:25

## 2023-01-01 RX ADMIN — DOCUSATE SODIUM 100 MG: 100 CAPSULE, LIQUID FILLED ORAL at 09:07

## 2023-01-01 RX ADMIN — GUAIFENESIN 600 MG: 600 TABLET, EXTENDED RELEASE ORAL at 23:04

## 2023-01-01 RX ADMIN — IPRATROPIUM BROMIDE AND ALBUTEROL SULFATE 3 ML: 2.5; .5 SOLUTION RESPIRATORY (INHALATION) at 18:41

## 2023-01-01 RX ADMIN — DOXYCYCLINE 100 MG: 100 INJECTION, POWDER, LYOPHILIZED, FOR SOLUTION INTRAVENOUS at 08:57

## 2023-01-01 RX ADMIN — PIPERACILLIN SODIUM AND TAZOBACTAM SODIUM 3.38 G: 3; .375 INJECTION, SOLUTION INTRAVENOUS at 03:19

## 2023-01-01 RX ADMIN — DIGOXIN 125 MCG: 125 TABLET ORAL at 18:24

## 2023-01-01 RX ADMIN — SODIUM CHLORIDE, POTASSIUM CHLORIDE, SODIUM LACTATE AND CALCIUM CHLORIDE 500 ML: 600; 310; 30; 20 INJECTION, SOLUTION INTRAVENOUS at 18:22

## 2023-01-01 RX ADMIN — IPRATROPIUM BROMIDE AND ALBUTEROL SULFATE 3 ML: 2.5; .5 SOLUTION RESPIRATORY (INHALATION) at 13:56

## 2023-01-01 RX ADMIN — ALBUTEROL SULFATE 2.5 MG: 2.5 SOLUTION RESPIRATORY (INHALATION) at 07:10

## 2023-01-01 RX ADMIN — ALBUTEROL SULFATE 2.5 MG: 2.5 SOLUTION RESPIRATORY (INHALATION) at 08:16

## 2023-01-01 RX ADMIN — SODIUM CHLORIDE SOLN NEBU 3% 3 ML: 3 NEBU SOLN at 11:43

## 2023-01-01 RX ADMIN — APIXABAN 5 MG: 5 TABLET, FILM COATED ORAL at 21:37

## 2023-01-01 RX ADMIN — DILTIAZEM HYDROCHLORIDE 120 MG: 120 CAPSULE, COATED, EXTENDED RELEASE ORAL at 09:26

## 2023-01-01 RX ADMIN — ATORVASTATIN CALCIUM 10 MG: 10 TABLET, FILM COATED ORAL at 20:40

## 2023-01-01 RX ADMIN — APIXABAN 5 MG: 5 TABLET, FILM COATED ORAL at 08:49

## 2023-01-01 RX ADMIN — ATORVASTATIN CALCIUM 10 MG: 10 TABLET, FILM COATED ORAL at 20:35

## 2023-01-01 RX ADMIN — DOXYCYCLINE 100 MG: 100 INJECTION, POWDER, LYOPHILIZED, FOR SOLUTION INTRAVENOUS at 10:27

## 2023-01-01 RX ADMIN — ALBUTEROL SULFATE 2.5 MG: 2.5 SOLUTION RESPIRATORY (INHALATION) at 19:10

## 2023-01-01 RX ADMIN — PIPERACILLIN SODIUM AND TAZOBACTAM SODIUM 3.38 G: 3; .375 INJECTION, SOLUTION INTRAVENOUS at 04:02

## 2023-01-01 RX ADMIN — PREDNISONE 20 MG: 20 TABLET ORAL at 08:45

## 2023-01-01 RX ADMIN — SODIUM CHLORIDE SOLN NEBU 3% 3 ML: 3 NEBU SOLN at 09:29

## 2023-01-01 RX ADMIN — LOSARTAN POTASSIUM 12.5 MG: 25 TABLET, FILM COATED ORAL at 09:15

## 2023-01-01 RX ADMIN — IPRATROPIUM BROMIDE AND ALBUTEROL SULFATE 3 ML: 2.5; .5 SOLUTION RESPIRATORY (INHALATION) at 18:32

## 2023-01-01 RX ADMIN — QUETIAPINE FUMARATE 25 MG: 25 TABLET ORAL at 18:41

## 2023-01-01 RX ADMIN — FORMOTEROL FUMARATE DIHYDRATE 20 MCG: 20 SOLUTION RESPIRATORY (INHALATION) at 07:13

## 2023-01-01 RX ADMIN — SERTRALINE HYDROCHLORIDE 50 MG: 50 TABLET ORAL at 12:51

## 2023-01-01 RX ADMIN — POTASSIUM CHLORIDE 40 MEQ: 1.5 POWDER, FOR SOLUTION ORAL at 17:24

## 2023-01-01 RX ADMIN — OXYBUTYNIN CHLORIDE 5 MG: 5 TABLET ORAL at 10:21

## 2023-01-01 RX ADMIN — LORAZEPAM 1 MG: 2 INJECTION INTRAMUSCULAR; INTRAVENOUS at 16:33

## 2023-01-01 RX ADMIN — SODIUM CHLORIDE SOLN NEBU 3% 3 ML: 3 NEBU SOLN at 07:12

## 2023-01-01 RX ADMIN — LOSARTAN POTASSIUM 12.5 MG: 25 TABLET, FILM COATED ORAL at 09:01

## 2023-01-01 RX ADMIN — FUROSEMIDE 40 MG: 40 TABLET ORAL at 09:01

## 2023-01-01 RX ADMIN — CEFTRIAXONE SODIUM 1 G: 1 INJECTION, SOLUTION INTRAVENOUS at 12:23

## 2023-01-01 RX ADMIN — APIXABAN 5 MG: 5 TABLET, FILM COATED ORAL at 22:08

## 2023-01-01 RX ADMIN — LOSARTAN POTASSIUM 12.5 MG: 25 TABLET, FILM COATED ORAL at 13:46

## 2023-01-01 RX ADMIN — FLUTICASONE PROPIONATE 2 SPRAY: 50 SPRAY, METERED NASAL at 10:20

## 2023-01-01 RX ADMIN — APIXABAN 5 MG: 5 TABLET, FILM COATED ORAL at 20:38

## 2023-01-01 RX ADMIN — PIPERACILLIN SODIUM AND TAZOBACTAM SODIUM 3.38 G: 3; .375 INJECTION, SOLUTION INTRAVENOUS at 03:35

## 2023-01-01 RX ADMIN — PIPERACILLIN SODIUM AND TAZOBACTAM SODIUM 4.5 G: 4; .5 INJECTION, SOLUTION INTRAVENOUS at 16:30

## 2023-01-01 RX ADMIN — CEFTRIAXONE SODIUM 1 G: 1 INJECTION, SOLUTION INTRAVENOUS at 11:28

## 2023-01-01 RX ADMIN — GUAIFENESIN 600 MG: 600 TABLET, EXTENDED RELEASE ORAL at 21:32

## 2023-01-01 RX ADMIN — SERTRALINE HYDROCHLORIDE 50 MG: 50 TABLET ORAL at 09:27

## 2023-01-01 RX ADMIN — DAPAGLIFLOZIN 10 MG: 10 TABLET, FILM COATED ORAL at 11:13

## 2023-01-01 RX ADMIN — PANTOPRAZOLE SODIUM 40 MG: 40 TABLET, DELAYED RELEASE ORAL at 09:16

## 2023-01-01 RX ADMIN — OXYBUTYNIN CHLORIDE 10 MG: 5 TABLET ORAL at 23:40

## 2023-01-01 RX ADMIN — FUROSEMIDE 40 MG: 40 TABLET ORAL at 08:49

## 2023-01-01 RX ADMIN — DILTIAZEM HYDROCHLORIDE 120 MG: 120 CAPSULE, COATED, EXTENDED RELEASE ORAL at 09:07

## 2023-01-01 RX ADMIN — SODIUM CHLORIDE, POTASSIUM CHLORIDE, SODIUM LACTATE AND CALCIUM CHLORIDE 250 ML: 600; 310; 30; 20 INJECTION, SOLUTION INTRAVENOUS at 09:43

## 2023-01-01 RX ADMIN — PANTOPRAZOLE SODIUM 40 MG: 40 TABLET, DELAYED RELEASE ORAL at 09:04

## 2023-01-01 RX ADMIN — POLYETHYLENE GLYCOL 3350 17 G: 17 POWDER, FOR SOLUTION ORAL at 10:20

## 2023-01-01 RX ADMIN — OXYBUTYNIN CHLORIDE 5 MG: 5 TABLET ORAL at 20:38

## 2023-01-01 RX ADMIN — DILTIAZEM HYDROCHLORIDE 120 MG: 120 CAPSULE, COATED, EXTENDED RELEASE ORAL at 10:36

## 2023-01-01 RX ADMIN — Medication: at 15:29

## 2023-01-01 RX ADMIN — APIXABAN 5 MG: 5 TABLET, FILM COATED ORAL at 10:15

## 2023-01-01 RX ADMIN — POLYETHYLENE GLYCOL 3350 17 G: 17 POWDER, FOR SOLUTION ORAL at 08:44

## 2023-01-01 RX ADMIN — DILTIAZEM HYDROCHLORIDE 120 MG: 120 CAPSULE, COATED, EXTENDED RELEASE ORAL at 08:44

## 2023-01-01 RX ADMIN — LOSARTAN POTASSIUM 12.5 MG: 25 TABLET, FILM COATED ORAL at 10:21

## 2023-01-01 RX ADMIN — POLYETHYLENE GLYCOL 3350 17 G: 17 POWDER, FOR SOLUTION ORAL at 08:59

## 2023-01-01 RX ADMIN — DAPAGLIFLOZIN 10 MG: 10 TABLET, FILM COATED ORAL at 09:16

## 2023-01-01 RX ADMIN — PIPERACILLIN SODIUM AND TAZOBACTAM SODIUM 3.38 G: 3; .375 INJECTION, SOLUTION INTRAVENOUS at 09:26

## 2023-01-01 RX ADMIN — ATORVASTATIN CALCIUM 10 MG: 10 TABLET, FILM COATED ORAL at 22:08

## 2023-01-01 RX ADMIN — ALBUTEROL SULFATE 2.5 MG: 2.5 SOLUTION RESPIRATORY (INHALATION) at 07:13

## 2023-01-01 RX ADMIN — DIGOXIN 125 MCG: 125 TABLET ORAL at 17:21

## 2023-01-01 RX ADMIN — DOXYCYCLINE 100 MG: 100 INJECTION, POWDER, LYOPHILIZED, FOR SOLUTION INTRAVENOUS at 09:00

## 2023-01-01 RX ADMIN — ALBUTEROL SULFATE 2.5 MG: 2.5 SOLUTION RESPIRATORY (INHALATION) at 19:00

## 2023-01-01 RX ADMIN — ALBUTEROL SULFATE 2.5 MG: 2.5 SOLUTION RESPIRATORY (INHALATION) at 19:19

## 2023-01-01 RX ADMIN — ALBUTEROL SULFATE 2.5 MG: 2.5 SOLUTION RESPIRATORY (INHALATION) at 07:04

## 2023-01-01 RX ADMIN — PIPERACILLIN SODIUM AND TAZOBACTAM SODIUM 3.38 G: 3; .375 INJECTION, SOLUTION INTRAVENOUS at 10:20

## 2023-01-01 RX ADMIN — METHYLPREDNISOLONE SODIUM SUCCINATE 40 MG: 40 INJECTION INTRAMUSCULAR; INTRAVENOUS at 20:38

## 2023-01-01 RX ADMIN — POLYETHYLENE GLYCOL 3350 17 G: 17 POWDER, FOR SOLUTION ORAL at 09:16

## 2023-01-01 RX ADMIN — ATORVASTATIN CALCIUM 10 MG: 10 TABLET, FILM COATED ORAL at 21:43

## 2023-01-01 RX ADMIN — METHYLPREDNISOLONE SODIUM SUCCINATE 20 MG: 40 INJECTION INTRAMUSCULAR; INTRAVENOUS at 12:11

## 2023-01-01 RX ADMIN — IPRATROPIUM BROMIDE AND ALBUTEROL SULFATE 3 ML: 2.5; .5 SOLUTION RESPIRATORY (INHALATION) at 12:05

## 2023-01-01 RX ADMIN — PIPERACILLIN SODIUM AND TAZOBACTAM SODIUM 3.38 G: 3; .375 INJECTION, SOLUTION INTRAVENOUS at 21:59

## 2023-01-01 RX ADMIN — DOCUSATE SODIUM 100 MG: 100 CAPSULE, LIQUID FILLED ORAL at 22:08

## 2023-01-01 RX ADMIN — ALBUTEROL SULFATE 2.5 MG: 2.5 SOLUTION RESPIRATORY (INHALATION) at 12:11

## 2023-01-01 RX ADMIN — DOCUSATE SODIUM 100 MG: 100 CAPSULE, LIQUID FILLED ORAL at 20:35

## 2023-01-01 RX ADMIN — PREDNISONE 40 MG: 20 TABLET ORAL at 15:05

## 2023-01-01 RX ADMIN — KETOROLAC TROMETHAMINE 15 MG: 30 INJECTION INTRAMUSCULAR; INTRAVENOUS at 11:02

## 2023-01-01 RX ADMIN — Medication: at 07:14

## 2023-01-01 RX ADMIN — Medication: at 07:40

## 2023-01-01 RX ADMIN — HYDROCORTISONE: 1 CREAM TOPICAL at 21:00

## 2023-01-01 RX ADMIN — PIPERACILLIN SODIUM AND TAZOBACTAM SODIUM 3.38 G: 3; .375 INJECTION, SOLUTION INTRAVENOUS at 21:33

## 2023-01-01 RX ADMIN — PANTOPRAZOLE SODIUM 40 MG: 40 TABLET, DELAYED RELEASE ORAL at 06:30

## 2023-01-01 RX ADMIN — APIXABAN 5 MG: 5 TABLET, FILM COATED ORAL at 09:07

## 2023-01-01 RX ADMIN — GUAIFENESIN 600 MG: 600 TABLET, EXTENDED RELEASE ORAL at 09:16

## 2023-01-01 RX ADMIN — TIOTROPIUM BROMIDE INHALATION SPRAY 2 PUFF: 3.12 SPRAY, METERED RESPIRATORY (INHALATION) at 07:58

## 2023-01-01 RX ADMIN — SODIUM CHLORIDE SOLN NEBU 3% 3 ML: 3 NEBU SOLN at 13:38

## 2023-01-01 RX ADMIN — DOXYCYCLINE 100 MG: 100 INJECTION, POWDER, LYOPHILIZED, FOR SOLUTION INTRAVENOUS at 13:41

## 2023-01-01 RX ADMIN — Medication 1 APPLICATION: at 10:10

## 2023-01-01 RX ADMIN — ALBUTEROL SULFATE 2.5 MG: 2.5 SOLUTION RESPIRATORY (INHALATION) at 19:55

## 2023-01-01 RX ADMIN — GUAIFENESIN 600 MG: 600 TABLET, EXTENDED RELEASE ORAL at 09:26

## 2023-01-01 RX ADMIN — PIPERACILLIN SODIUM AND TAZOBACTAM SODIUM 3.38 G: 3; .375 INJECTION, SOLUTION INTRAVENOUS at 16:39

## 2023-01-01 RX ADMIN — METHYLPREDNISOLONE SODIUM SUCCINATE 40 MG: 40 INJECTION INTRAMUSCULAR; INTRAVENOUS at 19:58

## 2023-01-01 RX ADMIN — SODIUM CHLORIDE SOLN NEBU 3% 3 ML: 3 NEBU SOLN at 19:19

## 2023-01-01 RX ADMIN — PIPERACILLIN SODIUM AND TAZOBACTAM SODIUM 3.38 G: 3; .375 INJECTION, SOLUTION INTRAVENOUS at 21:37

## 2023-01-01 RX ADMIN — HYDROCORTISONE: 1 CREAM TOPICAL at 20:35

## 2023-01-01 RX ADMIN — PIPERACILLIN SODIUM AND TAZOBACTAM SODIUM 3.38 G: 3; .375 INJECTION, SOLUTION INTRAVENOUS at 21:49

## 2023-01-01 RX ADMIN — APIXABAN 5 MG: 5 TABLET, FILM COATED ORAL at 20:41

## 2023-01-01 RX ADMIN — DOXYCYCLINE 100 MG: 100 INJECTION, POWDER, LYOPHILIZED, FOR SOLUTION INTRAVENOUS at 20:35

## 2023-01-01 RX ADMIN — FORMOTEROL FUMARATE DIHYDRATE 20 MCG: 20 SOLUTION RESPIRATORY (INHALATION) at 19:15

## 2023-01-01 RX ADMIN — PANTOPRAZOLE SODIUM 40 MG: 40 TABLET, DELAYED RELEASE ORAL at 06:02

## 2023-01-01 RX ADMIN — IPRATROPIUM BROMIDE AND ALBUTEROL SULFATE 3 ML: 2.5; .5 SOLUTION RESPIRATORY (INHALATION) at 13:42

## 2023-01-01 RX ADMIN — IPRATROPIUM BROMIDE AND ALBUTEROL SULFATE 3 ML: .5; 3 SOLUTION RESPIRATORY (INHALATION) at 06:08

## 2023-01-01 RX ADMIN — HYDROXYZINE HYDROCHLORIDE 25 MG: 25 TABLET ORAL at 20:44

## 2023-01-01 RX ADMIN — DOXYCYCLINE 100 MG: 100 INJECTION, POWDER, LYOPHILIZED, FOR SOLUTION INTRAVENOUS at 20:00

## 2023-01-01 RX ADMIN — DOCUSATE SODIUM 100 MG: 100 CAPSULE, LIQUID FILLED ORAL at 21:02

## 2023-01-01 RX ADMIN — IPRATROPIUM BROMIDE AND ALBUTEROL SULFATE 3 ML: .5; 3 SOLUTION RESPIRATORY (INHALATION) at 21:45

## 2023-01-01 RX ADMIN — FUROSEMIDE 40 MG: 10 INJECTION, SOLUTION INTRAMUSCULAR; INTRAVENOUS at 06:09

## 2023-01-01 RX ADMIN — PIPERACILLIN SODIUM AND TAZOBACTAM SODIUM 3.38 G: 3; .375 INJECTION, SOLUTION INTRAVENOUS at 03:33

## 2023-01-01 RX ADMIN — DIGOXIN 125 MCG: 125 TABLET ORAL at 17:53

## 2023-01-01 RX ADMIN — FUROSEMIDE 40 MG: 40 TABLET ORAL at 09:24

## 2023-01-01 RX ADMIN — DOXYCYCLINE 100 MG: 100 INJECTION, POWDER, LYOPHILIZED, FOR SOLUTION INTRAVENOUS at 20:41

## 2023-01-01 RX ADMIN — POLYETHYLENE GLYCOL 3350 17 G: 17 POWDER, FOR SOLUTION ORAL at 14:12

## 2023-01-01 RX ADMIN — SODIUM CHLORIDE SOLN NEBU 3% 3 ML: 3 NEBU SOLN at 18:37

## 2023-01-01 RX ADMIN — APIXABAN 5 MG: 5 TABLET, FILM COATED ORAL at 08:44

## 2023-01-01 RX ADMIN — IPRATROPIUM BROMIDE AND ALBUTEROL SULFATE 3 ML: 2.5; .5 SOLUTION RESPIRATORY (INHALATION) at 06:54

## 2023-01-01 RX ADMIN — DOCUSATE SODIUM 100 MG: 100 CAPSULE, LIQUID FILLED ORAL at 20:41

## 2023-01-01 RX ADMIN — PIPERACILLIN SODIUM AND TAZOBACTAM SODIUM 3.38 G: 3; .375 INJECTION, SOLUTION INTRAVENOUS at 16:35

## 2023-01-01 RX ADMIN — IOHEXOL 75 ML: 350 INJECTION, SOLUTION INTRAVENOUS at 17:31

## 2023-01-01 RX ADMIN — FORMOTEROL FUMARATE DIHYDRATE 20 MCG: 20 SOLUTION RESPIRATORY (INHALATION) at 07:10

## 2023-01-01 RX ADMIN — DIPHENHYDRAMINE HYDROCHLORIDE 25 MG: 50 INJECTION, SOLUTION INTRAMUSCULAR; INTRAVENOUS at 01:57

## 2023-01-01 RX ADMIN — APIXABAN 5 MG: 5 TABLET, FILM COATED ORAL at 21:02

## 2023-01-01 RX ADMIN — ALBUTEROL SULFATE 2.5 MG: 2.5 SOLUTION RESPIRATORY (INHALATION) at 06:56

## 2023-01-01 RX ADMIN — DOCUSATE SODIUM 100 MG: 100 CAPSULE, LIQUID FILLED ORAL at 09:00

## 2023-01-01 RX ADMIN — IPRATROPIUM BROMIDE AND ALBUTEROL SULFATE 3 ML: 2.5; .5 SOLUTION RESPIRATORY (INHALATION) at 15:22

## 2023-01-01 RX ADMIN — APIXABAN 5 MG: 5 TABLET, FILM COATED ORAL at 09:10

## 2023-01-01 RX ADMIN — Medication 4 L/MIN: at 17:23

## 2023-01-01 RX ADMIN — IPRATROPIUM BROMIDE AND ALBUTEROL SULFATE 3 ML: .5; 3 SOLUTION RESPIRATORY (INHALATION) at 09:25

## 2023-01-01 RX ADMIN — FORMOTEROL FUMARATE DIHYDRATE 20 MCG: 20 SOLUTION RESPIRATORY (INHALATION) at 19:18

## 2023-01-01 RX ADMIN — ALBUTEROL SULFATE 2.5 MG: 2.5 SOLUTION RESPIRATORY (INHALATION) at 19:14

## 2023-01-01 RX ADMIN — CEFTRIAXONE SODIUM 1 G: 1 INJECTION, SOLUTION INTRAVENOUS at 10:37

## 2023-01-01 RX ADMIN — POTASSIUM CHLORIDE 40 MEQ: 1.5 POWDER, FOR SOLUTION ORAL at 10:17

## 2023-01-01 RX ADMIN — APIXABAN 5 MG: 5 TABLET, FILM COATED ORAL at 23:04

## 2023-01-01 RX ADMIN — HYDROXYZINE HYDROCHLORIDE 25 MG: 25 TABLET, FILM COATED ORAL at 17:45

## 2023-01-01 RX ADMIN — CEFTRIAXONE SODIUM 1 G: 1 INJECTION, SOLUTION INTRAVENOUS at 15:54

## 2023-01-01 RX ADMIN — APIXABAN 5 MG: 5 TABLET, FILM COATED ORAL at 09:24

## 2023-01-01 RX ADMIN — IPRATROPIUM BROMIDE AND ALBUTEROL SULFATE 3 ML: .5; 3 SOLUTION RESPIRATORY (INHALATION) at 03:34

## 2023-01-01 RX ADMIN — ATORVASTATIN CALCIUM 10 MG: 10 TABLET, FILM COATED ORAL at 09:17

## 2023-01-01 RX ADMIN — SERTRALINE HYDROCHLORIDE 50 MG: 50 TABLET ORAL at 08:44

## 2023-01-01 RX ADMIN — DIGOXIN 125 MCG: 125 TABLET ORAL at 19:53

## 2023-01-01 RX ADMIN — ALBUTEROL SULFATE 2.5 MG: 2.5 SOLUTION RESPIRATORY (INHALATION) at 20:39

## 2023-01-01 RX ADMIN — DOCUSATE SODIUM 100 MG: 100 CAPSULE, LIQUID FILLED ORAL at 08:49

## 2023-01-01 RX ADMIN — APIXABAN 5 MG: 5 TABLET, FILM COATED ORAL at 20:35

## 2023-01-01 RX ADMIN — FUROSEMIDE 40 MG: 40 TABLET ORAL at 17:17

## 2023-01-01 RX ADMIN — LOSARTAN POTASSIUM 12.5 MG: 25 TABLET, FILM COATED ORAL at 09:09

## 2023-01-01 RX ADMIN — IPRATROPIUM BROMIDE AND ALBUTEROL SULFATE 3 ML: 2.5; .5 SOLUTION RESPIRATORY (INHALATION) at 07:06

## 2023-01-01 RX ADMIN — CEFTRIAXONE SODIUM 1 G: 1 INJECTION, SOLUTION INTRAVENOUS at 13:31

## 2023-01-01 RX ADMIN — IPRATROPIUM BROMIDE AND ALBUTEROL SULFATE 3 ML: .5; 3 SOLUTION RESPIRATORY (INHALATION) at 10:59

## 2023-01-01 RX ADMIN — METHYLPREDNISOLONE SODIUM SUCCINATE 20 MG: 40 INJECTION INTRAMUSCULAR; INTRAVENOUS at 20:28

## 2023-01-01 RX ADMIN — IPRATROPIUM BROMIDE AND ALBUTEROL SULFATE 3 ML: .5; 3 SOLUTION RESPIRATORY (INHALATION) at 17:22

## 2023-01-01 RX ADMIN — SERTRALINE HYDROCHLORIDE 50 MG: 50 TABLET ORAL at 09:00

## 2023-01-01 RX ADMIN — Medication 5 L/MIN: at 14:00

## 2023-01-01 RX ADMIN — SERTRALINE HYDROCHLORIDE 50 MG: 50 TABLET ORAL at 09:24

## 2023-01-01 RX ADMIN — FUROSEMIDE 40 MG: 40 TABLET ORAL at 09:16

## 2023-01-01 RX ADMIN — ACETAMINOPHEN 650 MG: 325 TABLET ORAL at 16:41

## 2023-01-01 RX ADMIN — APIXABAN 5 MG: 5 TABLET, FILM COATED ORAL at 20:40

## 2023-01-01 RX ADMIN — DILTIAZEM HYDROCHLORIDE 120 MG: 120 CAPSULE, COATED, EXTENDED RELEASE ORAL at 09:01

## 2023-01-01 RX ADMIN — DIGOXIN 125 MCG: 125 TABLET ORAL at 18:26

## 2023-01-01 RX ADMIN — MAGNESIUM SULFATE HEPTAHYDRATE 2 G: 40 INJECTION, SOLUTION INTRAVENOUS at 14:13

## 2023-01-01 RX ADMIN — MAGNESIUM SULFATE HEPTAHYDRATE 2 G: 40 INJECTION, SOLUTION INTRAVENOUS at 10:22

## 2023-01-01 RX ADMIN — SODIUM CHLORIDE, POTASSIUM CHLORIDE, SODIUM LACTATE AND CALCIUM CHLORIDE 50 ML/HR: 600; 310; 30; 20 INJECTION, SOLUTION INTRAVENOUS at 21:49

## 2023-01-01 RX ADMIN — METHYLPREDNISOLONE SODIUM SUCCINATE 20 MG: 40 INJECTION INTRAMUSCULAR; INTRAVENOUS at 09:24

## 2023-01-01 RX ADMIN — DILTIAZEM HYDROCHLORIDE 120 MG: 120 CAPSULE, COATED, EXTENDED RELEASE ORAL at 09:00

## 2023-01-01 RX ADMIN — AZITHROMYCIN MONOHYDRATE 500 MG: 500 INJECTION, POWDER, LYOPHILIZED, FOR SOLUTION INTRAVENOUS at 17:42

## 2023-01-01 RX ADMIN — SODIUM CHLORIDE SOLN NEBU 3% 3 ML: 3 NEBU SOLN at 07:02

## 2023-01-01 RX ADMIN — HALOPERIDOL LACTATE 2 MG: 5 INJECTION, SOLUTION INTRAMUSCULAR at 21:59

## 2023-01-01 RX ADMIN — TIOTROPIUM BROMIDE INHALATION SPRAY 2 PUFF: 3.12 SPRAY, METERED RESPIRATORY (INHALATION) at 07:06

## 2023-01-01 RX ADMIN — ALBUTEROL SULFATE 2.5 MG: 2.5 SOLUTION RESPIRATORY (INHALATION) at 06:55

## 2023-01-01 RX ADMIN — PANTOPRAZOLE SODIUM 40 MG: 40 TABLET, DELAYED RELEASE ORAL at 07:41

## 2023-01-01 RX ADMIN — ALBUTEROL SULFATE 2.5 MG: 2.5 SOLUTION RESPIRATORY (INHALATION) at 12:33

## 2023-01-01 RX ADMIN — APIXABAN 5 MG: 5 TABLET, FILM COATED ORAL at 10:21

## 2023-01-01 RX ADMIN — POLYETHYLENE GLYCOL 3350 17 G: 17 POWDER, FOR SOLUTION ORAL at 09:09

## 2023-01-01 RX ADMIN — HYDROCORTISONE: 1 CREAM TOPICAL at 09:00

## 2023-01-01 RX ADMIN — GLYCOPYRROLATE 0.2 MG: 0.2 INJECTION, SOLUTION INTRAMUSCULAR; INTRAVENOUS at 11:01

## 2023-01-01 RX ADMIN — IPRATROPIUM BROMIDE AND ALBUTEROL SULFATE 3 ML: .5; 3 SOLUTION RESPIRATORY (INHALATION) at 11:00

## 2023-01-01 RX ADMIN — OXYBUTYNIN CHLORIDE 5 MG: 5 TABLET ORAL at 19:58

## 2023-01-01 RX ADMIN — TIOTROPIUM BROMIDE INHALATION SPRAY 2 PUFF: 3.12 SPRAY, METERED RESPIRATORY (INHALATION) at 06:56

## 2023-01-01 RX ADMIN — HYDROCORTISONE: 1 CREAM TOPICAL at 20:41

## 2023-01-01 RX ADMIN — SODIUM CHLORIDE SOLN NEBU 3% 4 ML: 3 NEBU SOLN at 20:40

## 2023-01-01 RX ADMIN — ATORVASTATIN CALCIUM 10 MG: 10 TABLET, FILM COATED ORAL at 09:26

## 2023-01-01 RX ADMIN — IPRATROPIUM BROMIDE AND ALBUTEROL SULFATE 3 ML: 2.5; .5 SOLUTION RESPIRATORY (INHALATION) at 06:25

## 2023-01-01 RX ADMIN — FUROSEMIDE 40 MG: 40 TABLET ORAL at 17:45

## 2023-01-01 RX ADMIN — METHYLPREDNISOLONE 20 MG: 4 TABLET ORAL at 14:37

## 2023-01-01 RX ADMIN — SODIUM CHLORIDE SOLN NEBU 3% 3 ML: 3 NEBU SOLN at 13:47

## 2023-01-01 RX ADMIN — Medication 1 APPLICATION: at 09:07

## 2023-01-01 RX ADMIN — HYDROCORTISONE: 1 CREAM TOPICAL at 09:08

## 2023-01-01 RX ADMIN — APIXABAN 5 MG: 5 TABLET, FILM COATED ORAL at 09:00

## 2023-01-01 RX ADMIN — DOXYCYCLINE 100 MG: 100 INJECTION, POWDER, LYOPHILIZED, FOR SOLUTION INTRAVENOUS at 09:58

## 2023-01-01 RX ADMIN — SODIUM CHLORIDE SOLN NEBU 3% 3 ML: 3 NEBU SOLN at 19:03

## 2023-01-01 RX ADMIN — VANCOMYCIN HYDROCHLORIDE 1 G: 1 INJECTION, SOLUTION INTRAVENOUS at 18:00

## 2023-01-01 RX ADMIN — HYDROCORTISONE: 1 CREAM TOPICAL at 10:10

## 2023-01-01 RX ADMIN — DOCUSATE SODIUM 100 MG: 100 CAPSULE, LIQUID FILLED ORAL at 08:44

## 2023-01-01 RX ADMIN — Medication 1 APPLICATION: at 11:16

## 2023-01-01 RX ADMIN — LOSARTAN POTASSIUM 12.5 MG: 25 TABLET, FILM COATED ORAL at 09:24

## 2023-01-01 RX ADMIN — ALBUTEROL SULFATE 2.5 MG: 2.5 SOLUTION RESPIRATORY (INHALATION) at 11:43

## 2023-01-01 RX ADMIN — APIXABAN 5 MG: 5 TABLET, FILM COATED ORAL at 09:16

## 2023-01-01 RX ADMIN — IPRATROPIUM BROMIDE AND ALBUTEROL SULFATE 3 ML: .5; 3 SOLUTION RESPIRATORY (INHALATION) at 05:33

## 2023-01-01 RX ADMIN — IPRATROPIUM BROMIDE AND ALBUTEROL SULFATE 3 ML: .5; 3 SOLUTION RESPIRATORY (INHALATION) at 20:56

## 2023-01-01 RX ADMIN — DAPAGLIFLOZIN 10 MG: 10 TABLET, FILM COATED ORAL at 10:59

## 2023-01-01 RX ADMIN — FORMOTEROL FUMARATE DIHYDRATE 20 MCG: 20 SOLUTION RESPIRATORY (INHALATION) at 07:16

## 2023-01-01 RX ADMIN — GUAIFENESIN 600 MG: 600 TABLET, EXTENDED RELEASE ORAL at 10:21

## 2023-01-01 RX ADMIN — GUAIFENESIN 600 MG: 600 TABLET, EXTENDED RELEASE ORAL at 19:59

## 2023-01-01 RX ADMIN — DAPAGLIFLOZIN 10 MG: 10 TABLET, FILM COATED ORAL at 09:58

## 2023-01-01 RX ADMIN — IPRATROPIUM BROMIDE AND ALBUTEROL SULFATE 3 ML: 2.5; .5 SOLUTION RESPIRATORY (INHALATION) at 13:30

## 2023-01-01 RX ADMIN — DAPAGLIFLOZIN 10 MG: 10 TABLET, FILM COATED ORAL at 09:09

## 2023-01-01 RX ADMIN — DOCUSATE SODIUM 100 MG: 100 CAPSULE, LIQUID FILLED ORAL at 14:12

## 2023-01-01 RX ADMIN — ACETAMINOPHEN 650 MG: 325 TABLET ORAL at 12:51

## 2023-01-01 RX ADMIN — SERTRALINE HYDROCHLORIDE 50 MG: 50 TABLET ORAL at 09:09

## 2023-01-01 RX ADMIN — APIXABAN 5 MG: 5 TABLET, FILM COATED ORAL at 12:50

## 2023-01-01 RX ADMIN — DOXYCYCLINE 100 MG: 100 INJECTION, POWDER, LYOPHILIZED, FOR SOLUTION INTRAVENOUS at 20:28

## 2023-01-01 RX ADMIN — DOCUSATE SODIUM 100 MG: 100 CAPSULE, LIQUID FILLED ORAL at 09:09

## 2023-01-01 RX ADMIN — HYDROCORTISONE: 1 CREAM TOPICAL at 09:13

## 2023-01-01 RX ADMIN — PIPERACILLIN SODIUM AND TAZOBACTAM SODIUM 3.38 G: 3; .375 INJECTION, SOLUTION INTRAVENOUS at 12:37

## 2023-01-01 RX ADMIN — SODIUM CHLORIDE SOLN NEBU 3% 3 ML: 3 NEBU SOLN at 06:56

## 2023-01-01 RX ADMIN — ATORVASTATIN CALCIUM 10 MG: 10 TABLET, FILM COATED ORAL at 21:02

## 2023-01-01 RX ADMIN — SODIUM CHLORIDE SOLN NEBU 3% 3 ML: 3 NEBU SOLN at 13:46

## 2023-01-01 RX ADMIN — ALBUTEROL SULFATE 2.5 MG: 2.5 SOLUTION RESPIRATORY (INHALATION) at 12:12

## 2023-01-01 RX ADMIN — PANTOPRAZOLE SODIUM 40 MG: 40 TABLET, DELAYED RELEASE ORAL at 06:09

## 2023-01-01 RX ADMIN — OXYBUTYNIN CHLORIDE 5 MG: 5 TABLET ORAL at 09:15

## 2023-01-01 RX ADMIN — ATORVASTATIN CALCIUM 10 MG: 10 TABLET, FILM COATED ORAL at 21:59

## 2023-01-01 RX ADMIN — ATORVASTATIN CALCIUM 10 MG: 10 TABLET, FILM COATED ORAL at 23:41

## 2023-01-01 RX ADMIN — METHYLPREDNISOLONE SODIUM SUCCINATE 40 MG: 40 INJECTION INTRAMUSCULAR; INTRAVENOUS at 21:04

## 2023-01-01 RX ADMIN — CEFTRIAXONE SODIUM 1 G: 1 INJECTION, SOLUTION INTRAVENOUS at 12:12

## 2023-01-01 RX ADMIN — IPRATROPIUM BROMIDE AND ALBUTEROL SULFATE 3 ML: 2.5; .5 SOLUTION RESPIRATORY (INHALATION) at 11:52

## 2023-01-01 RX ADMIN — Medication 1 APPLICATION: at 08:56

## 2023-01-01 RX ADMIN — SODIUM CHLORIDE SOLN NEBU 3% 3 ML: 3 NEBU SOLN at 13:51

## 2023-01-01 RX ADMIN — ALBUTEROL SULFATE 2.5 MG: 2.5 SOLUTION RESPIRATORY (INHALATION) at 07:07

## 2023-01-01 RX ADMIN — DAPAGLIFLOZIN 10 MG: 10 TABLET, FILM COATED ORAL at 08:45

## 2023-01-01 RX ADMIN — POLYETHYLENE GLYCOL 3350 17 G: 17 POWDER, FOR SOLUTION ORAL at 09:07

## 2023-01-01 RX ADMIN — PANTOPRAZOLE SODIUM 40 MG: 40 TABLET, DELAYED RELEASE ORAL at 06:16

## 2023-01-01 RX ADMIN — IPRATROPIUM BROMIDE AND ALBUTEROL SULFATE 3 ML: 2.5; .5 SOLUTION RESPIRATORY (INHALATION) at 18:37

## 2023-01-01 RX ADMIN — APIXABAN 5 MG: 5 TABLET, FILM COATED ORAL at 23:40

## 2023-01-01 RX ADMIN — FUROSEMIDE 40 MG: 40 TABLET ORAL at 09:07

## 2023-01-01 RX ADMIN — SODIUM CHLORIDE SOLN NEBU 3% 3 ML: 3 NEBU SOLN at 12:35

## 2023-01-01 RX ADMIN — FUROSEMIDE 40 MG: 40 TABLET ORAL at 17:53

## 2023-01-01 RX ADMIN — SERTRALINE HYDROCHLORIDE 50 MG: 50 TABLET ORAL at 23:04

## 2023-01-01 RX ADMIN — LOSARTAN POTASSIUM 12.5 MG: 25 TABLET, FILM COATED ORAL at 08:44

## 2023-01-01 RX ADMIN — DOCUSATE SODIUM 100 MG: 100 CAPSULE, LIQUID FILLED ORAL at 20:28

## 2023-01-01 RX ADMIN — FORMOTEROL FUMARATE DIHYDRATE 20 MCG: 20 SOLUTION RESPIRATORY (INHALATION) at 08:05

## 2023-01-01 RX ADMIN — DOXYCYCLINE 100 MG: 100 INJECTION, POWDER, LYOPHILIZED, FOR SOLUTION INTRAVENOUS at 08:41

## 2023-01-01 RX ADMIN — DILTIAZEM HYDROCHLORIDE 120 MG: 120 CAPSULE, COATED, EXTENDED RELEASE ORAL at 09:16

## 2023-01-01 RX ADMIN — IPRATROPIUM BROMIDE AND ALBUTEROL SULFATE 3 ML: 2.5; .5 SOLUTION RESPIRATORY (INHALATION) at 12:15

## 2023-01-01 RX ADMIN — APIXABAN 5 MG: 5 TABLET, FILM COATED ORAL at 20:28

## 2023-01-01 RX ADMIN — SERTRALINE HYDROCHLORIDE 50 MG: 50 TABLET ORAL at 19:53

## 2023-01-01 RX ADMIN — PREDNISONE 20 MG: 20 TABLET ORAL at 17:45

## 2023-01-01 RX ADMIN — POTASSIUM CHLORIDE 20 MEQ: 14.9 INJECTION, SOLUTION INTRAVENOUS at 11:02

## 2023-01-01 RX ADMIN — METHYLPREDNISOLONE SODIUM SUCCINATE 40 MG: 40 INJECTION INTRAMUSCULAR; INTRAVENOUS at 21:57

## 2023-01-01 RX ADMIN — SODIUM CHLORIDE SOLN NEBU 3% 3 ML: 3 NEBU SOLN at 19:59

## 2023-01-01 RX ADMIN — LOSARTAN POTASSIUM 12.5 MG: 25 TABLET, FILM COATED ORAL at 13:57

## 2023-01-01 RX ADMIN — APIXABAN 5 MG: 5 TABLET, FILM COATED ORAL at 10:38

## 2023-01-01 RX ADMIN — ATORVASTATIN CALCIUM 10 MG: 10 TABLET, FILM COATED ORAL at 10:21

## 2023-01-01 RX ADMIN — PIPERACILLIN SODIUM AND TAZOBACTAM SODIUM 3.38 G: 3; .375 INJECTION, SOLUTION INTRAVENOUS at 09:43

## 2023-01-01 RX ADMIN — OXYBUTYNIN CHLORIDE 5 MG: 5 TABLET ORAL at 10:39

## 2023-01-01 RX ADMIN — CEFTRIAXONE SODIUM 1 G: 1 INJECTION, SOLUTION INTRAVENOUS at 10:58

## 2023-01-01 RX ADMIN — PANTOPRAZOLE SODIUM 40 MG: 40 TABLET, DELAYED RELEASE ORAL at 06:29

## 2023-01-01 RX ADMIN — IPRATROPIUM BROMIDE AND ALBUTEROL SULFATE 3 ML: 2.5; .5 SOLUTION RESPIRATORY (INHALATION) at 15:58

## 2023-01-01 RX ADMIN — AZITHROMYCIN MONOHYDRATE 500 MG: 500 INJECTION, POWDER, LYOPHILIZED, FOR SOLUTION INTRAVENOUS at 21:25

## 2023-01-01 RX ADMIN — OXYBUTYNIN CHLORIDE 5 MG: 5 TABLET ORAL at 21:29

## 2023-01-01 RX ADMIN — HALOPERIDOL LACTATE 1 MG: 5 INJECTION, SOLUTION INTRAMUSCULAR at 18:17

## 2023-01-01 RX ADMIN — AZITHROMYCIN MONOHYDRATE 500 MG: 500 INJECTION, POWDER, LYOPHILIZED, FOR SOLUTION INTRAVENOUS at 18:10

## 2023-01-01 RX ADMIN — MAGNESIUM SULFATE HEPTAHYDRATE 4 G: 40 INJECTION, SOLUTION INTRAVENOUS at 19:53

## 2023-01-01 RX ADMIN — TIOTROPIUM BROMIDE INHALATION SPRAY 2 PUFF: 3.12 SPRAY, METERED RESPIRATORY (INHALATION) at 07:14

## 2023-01-01 RX ADMIN — FORMOTEROL FUMARATE DIHYDRATE 20 MCG: 20 SOLUTION RESPIRATORY (INHALATION) at 19:06

## 2023-01-01 RX ADMIN — SODIUM CHLORIDE SOLN NEBU 3% 3 ML: 3 NEBU SOLN at 07:07

## 2023-01-01 RX ADMIN — METHYLPREDNISOLONE SODIUM SUCCINATE 20 MG: 40 INJECTION INTRAMUSCULAR; INTRAVENOUS at 20:40

## 2023-01-01 RX ADMIN — SERTRALINE HYDROCHLORIDE 50 MG: 50 TABLET ORAL at 10:37

## 2023-01-01 RX ADMIN — METHYLPREDNISOLONE SODIUM SUCCINATE 20 MG: 40 INJECTION INTRAMUSCULAR; INTRAVENOUS at 08:45

## 2023-01-01 RX ADMIN — SODIUM CHLORIDE SOLN NEBU 3% 3 ML: 3 NEBU SOLN at 08:06

## 2023-01-01 RX ADMIN — ALBUTEROL SULFATE 2.5 MG: 2.5 SOLUTION RESPIRATORY (INHALATION) at 13:49

## 2023-01-01 RX ADMIN — PIPERACILLIN SODIUM AND TAZOBACTAM SODIUM 3.38 G: 3; .375 INJECTION, SOLUTION INTRAVENOUS at 00:53

## 2023-01-01 RX ADMIN — SODIUM CHLORIDE SOLN NEBU 3% 3 ML: 3 NEBU SOLN at 12:32

## 2023-01-01 RX ADMIN — PIPERACILLIN SODIUM AND TAZOBACTAM SODIUM 3.38 G: 3; .375 INJECTION, SOLUTION INTRAVENOUS at 17:03

## 2023-01-01 RX ADMIN — FUROSEMIDE 40 MG: 10 INJECTION, SOLUTION INTRAMUSCULAR; INTRAVENOUS at 05:49

## 2023-01-01 RX ADMIN — TIOTROPIUM BROMIDE INHALATION SPRAY 2 PUFF: 3.12 SPRAY, METERED RESPIRATORY (INHALATION) at 07:12

## 2023-01-01 RX ADMIN — PIPERACILLIN SODIUM AND TAZOBACTAM SODIUM 3.38 G: 3; .375 INJECTION, SOLUTION INTRAVENOUS at 23:18

## 2023-01-01 RX ADMIN — CEFTRIAXONE SODIUM 1 G: 1 INJECTION, SOLUTION INTRAVENOUS at 12:35

## 2023-01-01 RX ADMIN — OXYBUTYNIN CHLORIDE 5 MG: 5 TABLET ORAL at 09:26

## 2023-01-01 RX ADMIN — POTASSIUM CHLORIDE 40 MEQ: 1500 TABLET, EXTENDED RELEASE ORAL at 09:00

## 2023-01-01 RX ADMIN — SODIUM CHLORIDE SOLN NEBU 3% 3 ML: 3 NEBU SOLN at 19:41

## 2023-01-01 RX ADMIN — DOXYCYCLINE 100 MG: 100 INJECTION, POWDER, LYOPHILIZED, FOR SOLUTION INTRAVENOUS at 09:25

## 2023-01-01 RX ADMIN — HYDROCORTISONE: 1 CREAM TOPICAL at 14:23

## 2023-01-01 RX ADMIN — PANTOPRAZOLE SODIUM 40 MG: 40 TABLET, DELAYED RELEASE ORAL at 06:03

## 2023-01-01 RX ADMIN — SODIUM CHLORIDE SOLN NEBU 3% 3 ML: 3 NEBU SOLN at 19:17

## 2023-01-01 RX ADMIN — ALBUTEROL SULFATE 2.5 MG: 2.5 SOLUTION RESPIRATORY (INHALATION) at 13:37

## 2023-01-01 RX ADMIN — PANTOPRAZOLE SODIUM 40 MG: 40 TABLET, DELAYED RELEASE ORAL at 06:11

## 2023-01-01 RX ADMIN — SODIUM CHLORIDE SOLN NEBU 3% 3 ML: 3 NEBU SOLN at 07:14

## 2023-01-01 RX ADMIN — SERTRALINE HYDROCHLORIDE 50 MG: 50 TABLET ORAL at 09:16

## 2023-01-01 RX ADMIN — SODIUM CHLORIDE SOLN NEBU 3% 3 ML: 3 NEBU SOLN at 12:12

## 2023-01-01 RX ADMIN — ACETAMINOPHEN 650 MG: 325 TABLET ORAL at 10:37

## 2023-01-01 RX ADMIN — PANTOPRAZOLE SODIUM 40 MG: 40 TABLET, DELAYED RELEASE ORAL at 09:00

## 2023-01-01 RX ADMIN — AZITHROMYCIN MONOHYDRATE 500 MG: 500 INJECTION, POWDER, LYOPHILIZED, FOR SOLUTION INTRAVENOUS at 17:28

## 2023-01-01 RX ADMIN — FUROSEMIDE 40 MG: 10 INJECTION, SOLUTION INTRAMUSCULAR; INTRAVENOUS at 11:49

## 2023-01-01 RX ADMIN — DOXYCYCLINE 100 MG: 100 INJECTION, POWDER, LYOPHILIZED, FOR SOLUTION INTRAVENOUS at 09:08

## 2023-01-01 RX ADMIN — SODIUM CHLORIDE SOLN NEBU 3% 3 ML: 3 NEBU SOLN at 06:59

## 2023-01-01 RX ADMIN — CEFTRIAXONE SODIUM 1 G: 1 INJECTION, SOLUTION INTRAVENOUS at 11:23

## 2023-01-01 RX ADMIN — DIGOXIN 125 MCG: 125 TABLET ORAL at 17:17

## 2023-01-01 RX ADMIN — LOSARTAN POTASSIUM 12.5 MG: 25 TABLET, FILM COATED ORAL at 09:26

## 2023-01-01 RX ADMIN — ATORVASTATIN CALCIUM 10 MG: 10 TABLET, FILM COATED ORAL at 20:28

## 2023-01-01 RX ADMIN — DAPAGLIFLOZIN 10 MG: 10 TABLET, FILM COATED ORAL at 14:22

## 2023-01-01 RX ADMIN — ACETAMINOPHEN 650 MG: 325 TABLET ORAL at 22:08

## 2023-01-01 RX ADMIN — GUAIFENESIN 600 MG: 600 TABLET, EXTENDED RELEASE ORAL at 10:38

## 2023-01-01 RX ADMIN — FORMOTEROL FUMARATE DIHYDRATE 20 MCG: 20 SOLUTION RESPIRATORY (INHALATION) at 19:22

## 2023-01-01 RX ADMIN — PIPERACILLIN SODIUM AND TAZOBACTAM SODIUM 3.38 G: 3; .375 INJECTION, SOLUTION INTRAVENOUS at 16:19

## 2023-01-01 RX ADMIN — FUROSEMIDE 40 MG: 10 INJECTION, SOLUTION INTRAMUSCULAR; INTRAVENOUS at 09:26

## 2023-01-01 RX ADMIN — POTASSIUM CHLORIDE 20 MEQ: 14.9 INJECTION, SOLUTION INTRAVENOUS at 14:15

## 2023-01-01 RX ADMIN — HYDROCORTISONE: 1 CREAM TOPICAL at 20:34

## 2023-01-01 RX ADMIN — DILTIAZEM HYDROCHLORIDE 120 MG: 120 CAPSULE, COATED, EXTENDED RELEASE ORAL at 10:21

## 2023-01-01 RX ADMIN — DOXYCYCLINE 100 MG: 100 INJECTION, POWDER, LYOPHILIZED, FOR SOLUTION INTRAVENOUS at 21:02

## 2023-01-01 RX ADMIN — OXYBUTYNIN CHLORIDE 5 MG: 5 TABLET ORAL at 23:39

## 2023-01-01 RX ADMIN — SERTRALINE HYDROCHLORIDE 50 MG: 50 TABLET ORAL at 10:21

## 2023-01-01 RX ADMIN — IPRATROPIUM BROMIDE AND ALBUTEROL SULFATE 3 ML: .5; 3 SOLUTION RESPIRATORY (INHALATION) at 11:59

## 2023-01-01 RX ADMIN — DOCUSATE SODIUM 100 MG: 100 CAPSULE, LIQUID FILLED ORAL at 09:08

## 2023-01-01 RX ADMIN — IPRATROPIUM BROMIDE AND ALBUTEROL SULFATE 3 ML: .5; 3 SOLUTION RESPIRATORY (INHALATION) at 17:23

## 2023-01-01 RX ADMIN — LOSARTAN POTASSIUM 12.5 MG: 25 TABLET, FILM COATED ORAL at 08:50

## 2023-01-01 RX ADMIN — FLUTICASONE PROPIONATE 2 SPRAY: 50 SPRAY, METERED NASAL at 09:16

## 2023-01-01 RX ADMIN — Medication 1 APPLICATION: at 08:47

## 2023-01-01 RX ADMIN — POTASSIUM CHLORIDE 20 MEQ: 14.9 INJECTION, SOLUTION INTRAVENOUS at 11:26

## 2023-01-01 RX ADMIN — ALBUTEROL SULFATE 2.5 MG: 2.5 SOLUTION RESPIRATORY (INHALATION) at 13:41

## 2023-01-01 RX ADMIN — FORMOTEROL FUMARATE DIHYDRATE 20 MCG: 20 SOLUTION RESPIRATORY (INHALATION) at 20:37

## 2023-01-01 RX ADMIN — PIPERACILLIN SODIUM AND TAZOBACTAM SODIUM 3.38 G: 3; .375 INJECTION, SOLUTION INTRAVENOUS at 04:46

## 2023-01-01 RX ADMIN — AZITHROMYCIN MONOHYDRATE 500 MG: 500 INJECTION, POWDER, LYOPHILIZED, FOR SOLUTION INTRAVENOUS at 17:22

## 2023-01-01 RX ADMIN — APIXABAN 5 MG: 5 TABLET, FILM COATED ORAL at 19:59

## 2023-01-01 RX ADMIN — SODIUM CHLORIDE SOLN NEBU 3% 3 ML: 3 NEBU SOLN at 19:14

## 2023-01-01 RX ADMIN — SODIUM CHLORIDE SOLN NEBU 3% 3 ML: 3 NEBU SOLN at 12:13

## 2023-01-01 RX ADMIN — FUROSEMIDE 40 MG: 40 TABLET ORAL at 09:10

## 2023-01-01 RX ADMIN — APIXABAN 5 MG: 5 TABLET, FILM COATED ORAL at 09:27

## 2023-01-01 RX ADMIN — FUROSEMIDE 40 MG: 40 TABLET ORAL at 09:09

## 2023-01-01 RX ADMIN — PIPERACILLIN SODIUM AND TAZOBACTAM SODIUM 3.38 G: 3; .375 INJECTION, SOLUTION INTRAVENOUS at 06:00

## 2023-01-01 RX ADMIN — ATORVASTATIN CALCIUM 10 MG: 10 TABLET, FILM COATED ORAL at 20:41

## 2023-01-01 RX ADMIN — LOSARTAN POTASSIUM 12.5 MG: 25 TABLET, FILM COATED ORAL at 09:07

## 2023-01-01 RX ADMIN — POLYETHYLENE GLYCOL 3350 17 G: 17 POWDER, FOR SOLUTION ORAL at 08:43

## 2023-01-01 RX ADMIN — PIPERACILLIN SODIUM AND TAZOBACTAM SODIUM 3.38 G: 3; .375 INJECTION, SOLUTION INTRAVENOUS at 18:18

## 2023-01-01 RX ADMIN — TIOTROPIUM BROMIDE INHALATION SPRAY 2 PUFF: 3.12 SPRAY, METERED RESPIRATORY (INHALATION) at 07:17

## 2023-01-01 RX ADMIN — FUROSEMIDE 40 MG: 40 TABLET ORAL at 08:45

## 2023-01-01 RX ADMIN — PANTOPRAZOLE SODIUM 40 MG: 40 TABLET, DELAYED RELEASE ORAL at 06:42

## 2023-01-01 RX ADMIN — SERTRALINE HYDROCHLORIDE 50 MG: 50 TABLET ORAL at 09:07

## 2023-01-01 RX ADMIN — POLYETHYLENE GLYCOL 3350 17 G: 17 POWDER, FOR SOLUTION ORAL at 09:26

## 2023-01-01 RX ADMIN — METHYLPREDNISOLONE SODIUM SUCCINATE 40 MG: 40 INJECTION INTRAMUSCULAR; INTRAVENOUS at 21:25

## 2023-01-01 RX ADMIN — DILTIAZEM HYDROCHLORIDE 120 MG: 120 CAPSULE, COATED, EXTENDED RELEASE ORAL at 21:59

## 2023-01-01 RX ADMIN — APIXABAN 5 MG: 5 TABLET, FILM COATED ORAL at 09:08

## 2023-01-01 RX ADMIN — PIPERACILLIN SODIUM AND TAZOBACTAM SODIUM 3.38 G: 3; .375 INJECTION, SOLUTION INTRAVENOUS at 11:15

## 2023-01-01 RX ADMIN — FUROSEMIDE 40 MG: 10 INJECTION, SOLUTION INTRAMUSCULAR; INTRAVENOUS at 19:53

## 2023-01-01 RX ADMIN — LORAZEPAM 1 MG: 2 INJECTION INTRAMUSCULAR; INTRAVENOUS at 19:02

## 2023-01-01 RX ADMIN — DILTIAZEM HYDROCHLORIDE 120 MG: 120 CAPSULE, COATED, EXTENDED RELEASE ORAL at 21:37

## 2023-01-01 RX ADMIN — FORMOTEROL FUMARATE DIHYDRATE 20 MCG: 20 SOLUTION RESPIRATORY (INHALATION) at 12:32

## 2023-01-01 RX ADMIN — IPRATROPIUM BROMIDE AND ALBUTEROL SULFATE 3 ML: 2.5; .5 SOLUTION RESPIRATORY (INHALATION) at 07:35

## 2023-01-01 RX ADMIN — IPRATROPIUM BROMIDE AND ALBUTEROL SULFATE 3 ML: 2.5; .5 SOLUTION RESPIRATORY (INHALATION) at 19:23

## 2023-01-01 RX ADMIN — LOSARTAN POTASSIUM 12.5 MG: 25 TABLET, FILM COATED ORAL at 10:38

## 2023-01-01 SDOH — SOCIAL STABILITY: SOCIAL INSECURITY: DO YOU FEEL UNSAFE GOING BACK TO THE PLACE WHERE YOU ARE LIVING?: NO

## 2023-01-01 SDOH — SOCIAL STABILITY: SOCIAL INSECURITY: WERE YOU ABLE TO COMPLETE ALL THE BEHAVIORAL HEALTH SCREENINGS?: YES

## 2023-01-01 SDOH — SOCIAL STABILITY: SOCIAL INSECURITY: ARE YOU OR HAVE YOU BEEN THREATENED OR ABUSED PHYSICALLY, EMOTIONALLY, OR SEXUALLY BY ANYONE?: NO

## 2023-01-01 SDOH — SOCIAL STABILITY: SOCIAL INSECURITY: ARE THERE ANY APPARENT SIGNS OF INJURIES/BEHAVIORS THAT COULD BE RELATED TO ABUSE/NEGLECT?: NO

## 2023-01-01 SDOH — SOCIAL STABILITY: SOCIAL INSECURITY: ABUSE: ADULT

## 2023-01-01 SDOH — SOCIAL STABILITY: SOCIAL INSECURITY: DO YOU FEEL ANYONE HAS EXPLOITED OR TAKEN ADVANTAGE OF YOU FINANCIALLY OR OF YOUR PERSONAL PROPERTY?: NO

## 2023-01-01 SDOH — SOCIAL STABILITY: SOCIAL INSECURITY: HAVE YOU HAD THOUGHTS OF HARMING ANYONE ELSE?: NO

## 2023-01-01 SDOH — SOCIAL STABILITY: SOCIAL INSECURITY: HAS ANYONE EVER THREATENED TO HURT YOUR FAMILY OR YOUR PETS?: NO

## 2023-01-01 SDOH — SOCIAL STABILITY: SOCIAL INSECURITY: DOES ANYONE TRY TO KEEP YOU FROM HAVING/CONTACTING OTHER FRIENDS OR DOING THINGS OUTSIDE YOUR HOME?: NO

## 2023-01-01 ASSESSMENT — COGNITIVE AND FUNCTIONAL STATUS - GENERAL
MOBILITY SCORE: 6
MOVING TO AND FROM BED TO CHAIR: A LOT
DRESSING REGULAR UPPER BODY CLOTHING: A LITTLE
MOVING TO AND FROM BED TO CHAIR: A LOT
CLIMB 3 TO 5 STEPS WITH RAILING: A LOT
MOVING TO AND FROM BED TO CHAIR: A LOT
MOVING TO AND FROM BED TO CHAIR: TOTAL
HELP NEEDED FOR BATHING: A LOT
MOVING FROM LYING ON BACK TO SITTING ON SIDE OF FLAT BED WITH BEDRAILS: A LITTLE
DAILY ACTIVITIY SCORE: 18
CLIMB 3 TO 5 STEPS WITH RAILING: TOTAL
WALKING IN HOSPITAL ROOM: A LOT
MOVING FROM LYING ON BACK TO SITTING ON SIDE OF FLAT BED WITH BEDRAILS: A LITTLE
DRESSING REGULAR UPPER BODY CLOTHING: A LOT
DRESSING REGULAR UPPER BODY CLOTHING: A LOT
TURNING FROM BACK TO SIDE WHILE IN FLAT BAD: A LOT
DAILY ACTIVITIY SCORE: 13
STANDING UP FROM CHAIR USING ARMS: A LOT
CLIMB 3 TO 5 STEPS WITH RAILING: TOTAL
DRESSING REGULAR UPPER BODY CLOTHING: A LOT
DRESSING REGULAR UPPER BODY CLOTHING: A LOT
HELP NEEDED FOR BATHING: A LOT
DRESSING REGULAR LOWER BODY CLOTHING: A LOT
MOVING TO AND FROM BED TO CHAIR: A LOT
MOBILITY SCORE: 12
DRESSING REGULAR LOWER BODY CLOTHING: A LOT
DAILY ACTIVITIY SCORE: 13
MOVING TO AND FROM BED TO CHAIR: A LITTLE
MOVING TO AND FROM BED TO CHAIR: A LOT
CLIMB 3 TO 5 STEPS WITH RAILING: A LOT
CLIMB 3 TO 5 STEPS WITH RAILING: TOTAL
HELP NEEDED FOR BATHING: A LITTLE
TURNING FROM BACK TO SIDE WHILE IN FLAT BAD: A LITTLE
TURNING FROM BACK TO SIDE WHILE IN FLAT BAD: A LOT
STANDING UP FROM CHAIR USING ARMS: A LOT
DRESSING REGULAR LOWER BODY CLOTHING: TOTAL
MOVING TO AND FROM BED TO CHAIR: A LOT
STANDING UP FROM CHAIR USING ARMS: A LOT
HELP NEEDED FOR BATHING: A LITTLE
DAILY ACTIVITIY SCORE: 13
WALKING IN HOSPITAL ROOM: A LOT
MOBILITY SCORE: 12
DAILY ACTIVITIY SCORE: 13
DAILY ACTIVITIY SCORE: 6
DAILY ACTIVITIY SCORE: 13
TOILETING: A LOT
MOBILITY SCORE: 12
WALKING IN HOSPITAL ROOM: A LOT
TURNING FROM BACK TO SIDE WHILE IN FLAT BAD: A LITTLE
MOVING TO AND FROM BED TO CHAIR: A LOT
DRESSING REGULAR UPPER BODY CLOTHING: A LITTLE
TOILETING: A LOT
HELP NEEDED FOR BATHING: A LOT
TURNING FROM BACK TO SIDE WHILE IN FLAT BAD: A LOT
TURNING FROM BACK TO SIDE WHILE IN FLAT BAD: A LITTLE
DRESSING REGULAR LOWER BODY CLOTHING: A LOT
MOVING FROM LYING ON BACK TO SITTING ON SIDE OF FLAT BED WITH BEDRAILS: A LOT
PERSONAL GROOMING: A LITTLE
HELP NEEDED FOR BATHING: A LITTLE
WALKING IN HOSPITAL ROOM: A LOT
CLIMB 3 TO 5 STEPS WITH RAILING: A LOT
DRESSING REGULAR LOWER BODY CLOTHING: A LOT
TURNING FROM BACK TO SIDE WHILE IN FLAT BAD: A LITTLE
MOVING TO AND FROM BED TO CHAIR: A LOT
PERSONAL GROOMING: A LOT
DRESSING REGULAR LOWER BODY CLOTHING: A LITTLE
WALKING IN HOSPITAL ROOM: TOTAL
TOILETING: A LOT
MOBILITY SCORE: 11
MOVING FROM LYING ON BACK TO SITTING ON SIDE OF FLAT BED WITH BEDRAILS: A LITTLE
WALKING IN HOSPITAL ROOM: A LOT
CLIMB 3 TO 5 STEPS WITH RAILING: TOTAL
TURNING FROM BACK TO SIDE WHILE IN FLAT BAD: A LOT
STANDING UP FROM CHAIR USING ARMS: A LOT
MOVING TO AND FROM BED TO CHAIR: A LOT
DAILY ACTIVITIY SCORE: 18
HELP NEEDED FOR BATHING: A LOT
MOVING FROM LYING ON BACK TO SITTING ON SIDE OF FLAT BED WITH BEDRAILS: A LITTLE
PERSONAL GROOMING: A LOT
STANDING UP FROM CHAIR USING ARMS: A LOT
STANDING UP FROM CHAIR USING ARMS: A LOT
TOILETING: A LOT
TURNING FROM BACK TO SIDE WHILE IN FLAT BAD: A LOT
EATING MEALS: A LITTLE
STANDING UP FROM CHAIR USING ARMS: A LOT
DRESSING REGULAR UPPER BODY CLOTHING: A LOT
MOBILITY SCORE: 13
WALKING IN HOSPITAL ROOM: TOTAL
EATING MEALS: A LITTLE
PERSONAL GROOMING: TOTAL
DRESSING REGULAR LOWER BODY CLOTHING: A LOT
DRESSING REGULAR LOWER BODY CLOTHING: TOTAL
DAILY ACTIVITIY SCORE: 15
TURNING FROM BACK TO SIDE WHILE IN FLAT BAD: TOTAL
STANDING UP FROM CHAIR USING ARMS: A LOT
DRESSING REGULAR LOWER BODY CLOTHING: A LITTLE
MOVING FROM LYING ON BACK TO SITTING ON SIDE OF FLAT BED WITH BEDRAILS: A LITTLE
HELP NEEDED FOR BATHING: A LOT
EATING MEALS: A LITTLE
CLIMB 3 TO 5 STEPS WITH RAILING: TOTAL
WALKING IN HOSPITAL ROOM: TOTAL
EATING MEALS: A LITTLE
TOILETING: A LOT
MOVING FROM LYING ON BACK TO SITTING ON SIDE OF FLAT BED WITH BEDRAILS: A LITTLE
HELP NEEDED FOR BATHING: A LOT
MOBILITY SCORE: 13
PERSONAL GROOMING: A LOT
DAILY ACTIVITIY SCORE: 13
MOVING TO AND FROM BED TO CHAIR: A LOT
DRESSING REGULAR UPPER BODY CLOTHING: A LITTLE
DRESSING REGULAR LOWER BODY CLOTHING: A LITTLE
TOILETING: TOTAL
DRESSING REGULAR LOWER BODY CLOTHING: A LITTLE
MOBILITY SCORE: 10
DAILY ACTIVITIY SCORE: 13
DRESSING REGULAR LOWER BODY CLOTHING: A LITTLE
TURNING FROM BACK TO SIDE WHILE IN FLAT BAD: A LITTLE
DAILY ACTIVITIY SCORE: 18
WALKING IN HOSPITAL ROOM: A LOT
HELP NEEDED FOR BATHING: A LOT
TOILETING: A LOT
DRESSING REGULAR UPPER BODY CLOTHING: A LITTLE
WALKING IN HOSPITAL ROOM: A LOT
DAILY ACTIVITIY SCORE: 19
MOVING FROM LYING ON BACK TO SITTING ON SIDE OF FLAT BED WITH BEDRAILS: A LOT
WALKING IN HOSPITAL ROOM: TOTAL
CLIMB 3 TO 5 STEPS WITH RAILING: TOTAL
PERSONAL GROOMING: A LOT
MOVING FROM LYING ON BACK TO SITTING ON SIDE OF FLAT BED WITH BEDRAILS: A LOT
CLIMB 3 TO 5 STEPS WITH RAILING: TOTAL
HELP NEEDED FOR BATHING: A LOT
TURNING FROM BACK TO SIDE WHILE IN FLAT BAD: A LITTLE
DAILY ACTIVITIY SCORE: 16
MOVING FROM LYING ON BACK TO SITTING ON SIDE OF FLAT BED WITH BEDRAILS: A LITTLE
MOVING TO AND FROM BED TO CHAIR: A LOT
CLIMB 3 TO 5 STEPS WITH RAILING: TOTAL
TOILETING: A LITTLE
STANDING UP FROM CHAIR USING ARMS: A LOT
TURNING FROM BACK TO SIDE WHILE IN FLAT BAD: A LITTLE
MOVING FROM LYING ON BACK TO SITTING ON SIDE OF FLAT BED WITH BEDRAILS: TOTAL
CLIMB 3 TO 5 STEPS WITH RAILING: TOTAL
MOBILITY SCORE: 10
MOVING TO AND FROM BED TO CHAIR: A LOT
MOBILITY SCORE: 13
MOVING FROM LYING ON BACK TO SITTING ON SIDE OF FLAT BED WITH BEDRAILS: A LOT
DRESSING REGULAR UPPER BODY CLOTHING: A LOT
MOBILITY SCORE: 14
STANDING UP FROM CHAIR USING ARMS: A LOT
TOILETING: TOTAL
DAILY ACTIVITIY SCORE: 18
STANDING UP FROM CHAIR USING ARMS: A LOT
HELP NEEDED FOR BATHING: A LITTLE
DRESSING REGULAR LOWER BODY CLOTHING: A LOT
EATING MEALS: A LITTLE
STANDING UP FROM CHAIR USING ARMS: A LOT
STANDING UP FROM CHAIR USING ARMS: TOTAL
DAILY ACTIVITIY SCORE: 13
EATING MEALS: TOTAL
CLIMB 3 TO 5 STEPS WITH RAILING: A LOT
HELP NEEDED FOR BATHING: A LOT
TOILETING: A LOT
MOVING FROM LYING ON BACK TO SITTING ON SIDE OF FLAT BED WITH BEDRAILS: A LITTLE
TOILETING: A LOT
PERSONAL GROOMING: TOTAL
HELP NEEDED FOR BATHING: A LITTLE
DRESSING REGULAR LOWER BODY CLOTHING: TOTAL
WALKING IN HOSPITAL ROOM: TOTAL
TOILETING: A LOT
EATING MEALS: A LITTLE
DAILY ACTIVITIY SCORE: 12
DAILY ACTIVITIY SCORE: 13
STANDING UP FROM CHAIR USING ARMS: A LOT
HELP NEEDED FOR BATHING: A LITTLE
DRESSING REGULAR UPPER BODY CLOTHING: A LOT
STANDING UP FROM CHAIR USING ARMS: A LOT
CLIMB 3 TO 5 STEPS WITH RAILING: A LOT
STANDING UP FROM CHAIR USING ARMS: A LOT
HELP NEEDED FOR BATHING: A LOT
TOILETING: A LOT
CLIMB 3 TO 5 STEPS WITH RAILING: TOTAL
DAILY ACTIVITIY SCORE: 11
WALKING IN HOSPITAL ROOM: A LOT
EATING MEALS: A LITTLE
MOVING TO AND FROM BED TO CHAIR: TOTAL
WALKING IN HOSPITAL ROOM: A LOT
EATING MEALS: A LITTLE
DRESSING REGULAR UPPER BODY CLOTHING: TOTAL
TURNING FROM BACK TO SIDE WHILE IN FLAT BAD: A LITTLE
MOBILITY SCORE: 13
HELP NEEDED FOR BATHING: A LOT
DRESSING REGULAR LOWER BODY CLOTHING: A LOT
PERSONAL GROOMING: A LITTLE
CLIMB 3 TO 5 STEPS WITH RAILING: A LOT
WALKING IN HOSPITAL ROOM: A LOT
DRESSING REGULAR UPPER BODY CLOTHING: A LITTLE
STANDING UP FROM CHAIR USING ARMS: A LOT
TOILETING: A LOT
WALKING IN HOSPITAL ROOM: TOTAL
EATING MEALS: A LOT
CLIMB 3 TO 5 STEPS WITH RAILING: TOTAL
MOBILITY SCORE: 12
CLIMB 3 TO 5 STEPS WITH RAILING: TOTAL
HELP NEEDED FOR BATHING: A LOT
MOVING FROM LYING ON BACK TO SITTING ON SIDE OF FLAT BED WITH BEDRAILS: A LITTLE
MOBILITY SCORE: 12
HELP NEEDED FOR BATHING: A LOT
WALKING IN HOSPITAL ROOM: A LOT
DRESSING REGULAR UPPER BODY CLOTHING: A LITTLE
DRESSING REGULAR UPPER BODY CLOTHING: A LOT
DRESSING REGULAR UPPER BODY CLOTHING: TOTAL
DAILY ACTIVITIY SCORE: 6
MOVING FROM LYING ON BACK TO SITTING ON SIDE OF FLAT BED WITH BEDRAILS: A LOT
CLIMB 3 TO 5 STEPS WITH RAILING: TOTAL
DRESSING REGULAR LOWER BODY CLOTHING: A LOT
EATING MEALS: A LITTLE
MOVING TO AND FROM BED TO CHAIR: A LOT
MOVING FROM LYING ON BACK TO SITTING ON SIDE OF FLAT BED WITH BEDRAILS: A LITTLE
TURNING FROM BACK TO SIDE WHILE IN FLAT BAD: A LITTLE
DAILY ACTIVITIY SCORE: 13
CLIMB 3 TO 5 STEPS WITH RAILING: TOTAL
WALKING IN HOSPITAL ROOM: A LOT
STANDING UP FROM CHAIR USING ARMS: A LOT
DRESSING REGULAR LOWER BODY CLOTHING: A LOT
MOVING FROM LYING ON BACK TO SITTING ON SIDE OF FLAT BED WITH BEDRAILS: A LITTLE
DRESSING REGULAR LOWER BODY CLOTHING: A LOT
MOVING TO AND FROM BED TO CHAIR: A LOT
MOVING TO AND FROM BED TO CHAIR: A LOT
MOBILITY SCORE: 11
WALKING IN HOSPITAL ROOM: A LOT
HELP NEEDED FOR BATHING: TOTAL
DAILY ACTIVITIY SCORE: 20
WALKING IN HOSPITAL ROOM: A LOT
MOBILITY SCORE: 6
MOVING FROM LYING ON BACK TO SITTING ON SIDE OF FLAT BED WITH BEDRAILS: A LITTLE
STANDING UP FROM CHAIR USING ARMS: A LOT
PERSONAL GROOMING: A LOT
PERSONAL GROOMING: A LITTLE
MOBILITY SCORE: 12
MOVING TO AND FROM BED TO CHAIR: A LOT
CLIMB 3 TO 5 STEPS WITH RAILING: TOTAL
TOILETING: A LOT
TURNING FROM BACK TO SIDE WHILE IN FLAT BAD: A LITTLE
TURNING FROM BACK TO SIDE WHILE IN FLAT BAD: A LOT
MOBILITY SCORE: 13
DAILY ACTIVITIY SCORE: 18
EATING MEALS: A LITTLE
PATIENT BASELINE BEDBOUND: NO
PERSONAL GROOMING: A LOT
DRESSING REGULAR UPPER BODY CLOTHING: A LOT
EATING MEALS: TOTAL
DRESSING REGULAR UPPER BODY CLOTHING: A LOT
MOVING FROM LYING ON BACK TO SITTING ON SIDE OF FLAT BED WITH BEDRAILS: A LITTLE
EATING MEALS: A LITTLE
CLIMB 3 TO 5 STEPS WITH RAILING: TOTAL
DRESSING REGULAR UPPER BODY CLOTHING: A LOT
WALKING IN HOSPITAL ROOM: TOTAL
TOILETING: A LOT
MOBILITY SCORE: 14
DRESSING REGULAR LOWER BODY CLOTHING: A LOT
DAILY ACTIVITIY SCORE: 13
DRESSING REGULAR LOWER BODY CLOTHING: A LOT
TURNING FROM BACK TO SIDE WHILE IN FLAT BAD: A LITTLE
MOBILITY SCORE: 11
PERSONAL GROOMING: A LITTLE
MOBILITY SCORE: 16
MOVING FROM LYING ON BACK TO SITTING ON SIDE OF FLAT BED WITH BEDRAILS: A LITTLE
STANDING UP FROM CHAIR USING ARMS: A LOT
MOVING TO AND FROM BED TO CHAIR: A LOT
TURNING FROM BACK TO SIDE WHILE IN FLAT BAD: A LITTLE
STANDING UP FROM CHAIR USING ARMS: A LOT
TOILETING: TOTAL
CLIMB 3 TO 5 STEPS WITH RAILING: TOTAL
DRESSING REGULAR UPPER BODY CLOTHING: A LOT
PERSONAL GROOMING: A LITTLE
MOVING TO AND FROM BED TO CHAIR: A LOT
MOBILITY SCORE: 13
PERSONAL GROOMING: A LOT
TOILETING: TOTAL
MOVING TO AND FROM BED TO CHAIR: A LOT
TOILETING: A LOT
STANDING UP FROM CHAIR USING ARMS: A LOT
CLIMB 3 TO 5 STEPS WITH RAILING: TOTAL
PERSONAL GROOMING: A LOT
STANDING UP FROM CHAIR USING ARMS: A LOT
MOVING TO AND FROM BED TO CHAIR: A LOT
MOVING FROM LYING ON BACK TO SITTING ON SIDE OF FLAT BED WITH BEDRAILS: A LOT
MOVING TO AND FROM BED TO CHAIR: A LOT
MOBILITY SCORE: 14
HELP NEEDED FOR BATHING: A LITTLE
TURNING FROM BACK TO SIDE WHILE IN FLAT BAD: A LITTLE
CLIMB 3 TO 5 STEPS WITH RAILING: A LOT
DRESSING REGULAR LOWER BODY CLOTHING: TOTAL
PATIENT BASELINE BEDBOUND: NO
DRESSING REGULAR UPPER BODY CLOTHING: A LOT
TURNING FROM BACK TO SIDE WHILE IN FLAT BAD: A LOT
TOILETING: A LOT
PERSONAL GROOMING: A LOT
STANDING UP FROM CHAIR USING ARMS: A LITTLE
PERSONAL GROOMING: A LOT
CLIMB 3 TO 5 STEPS WITH RAILING: TOTAL
TURNING FROM BACK TO SIDE WHILE IN FLAT BAD: A LITTLE
STANDING UP FROM CHAIR USING ARMS: TOTAL
PERSONAL GROOMING: A LOT
STANDING UP FROM CHAIR USING ARMS: A LOT
TURNING FROM BACK TO SIDE WHILE IN FLAT BAD: A LITTLE
STANDING UP FROM CHAIR USING ARMS: A LOT
HELP NEEDED FOR BATHING: A LOT
PERSONAL GROOMING: A LITTLE
DRESSING REGULAR LOWER BODY CLOTHING: TOTAL
DAILY ACTIVITIY SCORE: 13
TOILETING: A LITTLE
MOVING FROM LYING ON BACK TO SITTING ON SIDE OF FLAT BED WITH BEDRAILS: A LITTLE
MOVING TO AND FROM BED TO CHAIR: A LOT
TURNING FROM BACK TO SIDE WHILE IN FLAT BAD: A LITTLE
TURNING FROM BACK TO SIDE WHILE IN FLAT BAD: A LITTLE
WALKING IN HOSPITAL ROOM: TOTAL
HELP NEEDED FOR BATHING: TOTAL
TOILETING: A LOT
WALKING IN HOSPITAL ROOM: A LOT
DRESSING REGULAR UPPER BODY CLOTHING: A LITTLE
MOVING TO AND FROM BED TO CHAIR: A LOT
EATING MEALS: A LITTLE
DRESSING REGULAR LOWER BODY CLOTHING: A LOT
MOVING FROM LYING ON BACK TO SITTING ON SIDE OF FLAT BED WITH BEDRAILS: A LITTLE
PERSONAL GROOMING: A LOT
WALKING IN HOSPITAL ROOM: A LOT
DRESSING REGULAR LOWER BODY CLOTHING: A LOT
MOVING FROM LYING ON BACK TO SITTING ON SIDE OF FLAT BED WITH BEDRAILS: A LITTLE
DRESSING REGULAR UPPER BODY CLOTHING: A LOT
MOVING FROM LYING ON BACK TO SITTING ON SIDE OF FLAT BED WITH BEDRAILS: A LOT
TOILETING: A LOT
HELP NEEDED FOR BATHING: A LOT
TURNING FROM BACK TO SIDE WHILE IN FLAT BAD: A LITTLE
PERSONAL GROOMING: A LOT
STANDING UP FROM CHAIR USING ARMS: A LOT
WALKING IN HOSPITAL ROOM: TOTAL
TURNING FROM BACK TO SIDE WHILE IN FLAT BAD: A LITTLE
MOVING TO AND FROM BED TO CHAIR: A LOT
MOVING FROM LYING ON BACK TO SITTING ON SIDE OF FLAT BED WITH BEDRAILS: A LITTLE
DRESSING REGULAR UPPER BODY CLOTHING: A LOT
DAILY ACTIVITIY SCORE: 11
MOBILITY SCORE: 12
PERSONAL GROOMING: A LOT
WALKING IN HOSPITAL ROOM: A LOT
MOVING FROM LYING ON BACK TO SITTING ON SIDE OF FLAT BED WITH BEDRAILS: A LITTLE
WALKING IN HOSPITAL ROOM: TOTAL
DRESSING REGULAR LOWER BODY CLOTHING: A LOT
MOVING FROM LYING ON BACK TO SITTING ON SIDE OF FLAT BED WITH BEDRAILS: TOTAL
EATING MEALS: A LITTLE
MOBILITY SCORE: 14
CLIMB 3 TO 5 STEPS WITH RAILING: A LOT
DRESSING REGULAR UPPER BODY CLOTHING: A LOT
TOILETING: TOTAL
MOBILITY SCORE: 13
MOBILITY SCORE: 12
CLIMB 3 TO 5 STEPS WITH RAILING: TOTAL
MOVING TO AND FROM BED TO CHAIR: A LOT
HELP NEEDED FOR BATHING: A LOT
HELP NEEDED FOR BATHING: A LOT
TOILETING: A LOT
TURNING FROM BACK TO SIDE WHILE IN FLAT BAD: TOTAL
DRESSING REGULAR UPPER BODY CLOTHING: A LITTLE
MOBILITY SCORE: 14
TOILETING: A LOT
WALKING IN HOSPITAL ROOM: A LOT
PERSONAL GROOMING: A LITTLE
DRESSING REGULAR LOWER BODY CLOTHING: A LOT

## 2023-01-01 ASSESSMENT — PAIN SCALES - GENERAL
PAINLEVEL_OUTOF10: 0 - NO PAIN
PAINLEVEL_OUTOF10: 5 - MODERATE PAIN
PAINLEVEL_OUTOF10: 0 - NO PAIN
PAINLEVEL_OUTOF10: 3
PAINLEVEL_OUTOF10: 0 - NO PAIN
PAINLEVEL_OUTOF10: 8
PAINLEVEL_OUTOF10: 0 - NO PAIN
PAINLEVEL_OUTOF10: 10 - WORST POSSIBLE PAIN
PAINLEVEL_OUTOF10: 8
PAINLEVEL_OUTOF10: 0 - NO PAIN
PAINLEVEL_OUTOF10: 10 - WORST POSSIBLE PAIN
PAINLEVEL_OUTOF10: 0 - NO PAIN

## 2023-01-01 ASSESSMENT — ENCOUNTER SYMPTOMS
BACK PAIN: 1
COLOR CHANGE: 0
DYSURIA: 0
CHILLS: 0
CHILLS: 0
EYE ITCHING: 0
CHEST TIGHTNESS: 1
ADENOPATHY: 0
FEVER: 0
EYE DISCHARGE: 0
EYE PAIN: 0
SEIZURES: 0
BRUISES/BLEEDS EASILY: 0
DYSURIA: 0
CONFUSION: 1
COUGH: 1
FATIGUE: 1
VOMITING: 0
AGITATION: 0
HEADACHES: 0
DIZZINESS: 0
ARTHRALGIAS: 0
COUGH: 1
FACIAL ASYMMETRY: 0
BACK PAIN: 0
ABDOMINAL PAIN: 0
ARTHRALGIAS: 1
ABDOMINAL DISTENTION: 0
HEMATURIA: 0
SHORTNESS OF BREATH: 1
ABDOMINAL PAIN: 0
PALPITATIONS: 0
FEVER: 0
DIAPHORESIS: 0
SHORTNESS OF BREATH: 1
PALPITATIONS: 0
SORE THROAT: 0

## 2023-01-01 ASSESSMENT — LIFESTYLE VARIABLES
HOW OFTEN DO YOU HAVE A DRINK CONTAINING ALCOHOL: MONTHLY OR LESS
SUBSTANCE_ABUSE_PAST_12_MONTHS: NO
PRESCIPTION_ABUSE_PAST_12_MONTHS: NO
AUDIT-C TOTAL SCORE: 0
AUDIT-C TOTAL SCORE: 2
HAVE YOU EVER FELT YOU SHOULD CUT DOWN ON YOUR DRINKING: NO
REASON UNABLE TO ASSESS: NO
HOW MANY STANDARD DRINKS CONTAINING ALCOHOL DO YOU HAVE ON A TYPICAL DAY: 1 OR 2
HAVE PEOPLE ANNOYED YOU BY CRITICIZING YOUR DRINKING: NO
HAVE YOU EVER FELT YOU SHOULD CUT DOWN ON YOUR DRINKING: NO
SKIP TO QUESTIONS 9-10: 1
HOW OFTEN DO YOU HAVE A DRINK CONTAINING ALCOHOL: NEVER
EVER FELT BAD OR GUILTY ABOUT YOUR DRINKING: NO
AUDIT-C TOTAL SCORE: 0
HAVE PEOPLE ANNOYED YOU BY CRITICIZING YOUR DRINKING: NO
HOW MANY STANDARD DRINKS CONTAINING ALCOHOL DO YOU HAVE ON A TYPICAL DAY: PATIENT DOES NOT DRINK
HOW OFTEN DO YOU HAVE 6 OR MORE DRINKS ON ONE OCCASION: LESS THAN MONTHLY
HOW OFTEN DO YOU HAVE 6 OR MORE DRINKS ON ONE OCCASION: NEVER
EVER FELT BAD OR GUILTY ABOUT YOUR DRINKING: NO
EVER HAD A DRINK FIRST THING IN THE MORNING TO STEADY YOUR NERVES TO GET RID OF A HANGOVER: NO
AUDIT-C TOTAL SCORE: 2
EVER HAD A DRINK FIRST THING IN THE MORNING TO STEADY YOUR NERVES TO GET RID OF A HANGOVER: NO
SKIP TO QUESTIONS 9-10: 0

## 2023-01-01 ASSESSMENT — PAIN SCALES - PAIN ASSESSMENT IN ADVANCED DEMENTIA (PAINAD)
TOTALSCORE: 0
FACIALEXPRESSION: SMILING OR INEXPRESSIVE
BREATHING: NORMAL
CONSOLABILITY: NO NEED TO CONSOLE
TOTALSCORE: 3
BODYLANGUAGE: RELAXED
CONSOLABILITY: NO NEED TO CONSOLE
BREATHING: NOISY LABORED BREATHING, LONG PERIODS OF HYPERVENTILATION, CHEYNE-STOKES RESPIRATIONS
BODYLANGUAGE: TENSE, DISTRESSED PACING, FIDGETING
FACIALEXPRESSION: SMILING OR INEXPRESSIVE

## 2023-01-01 ASSESSMENT — PAIN - FUNCTIONAL ASSESSMENT

## 2023-01-01 ASSESSMENT — ACTIVITIES OF DAILY LIVING (ADL)
BATHING: NEEDS ASSISTANCE
LACK_OF_TRANSPORTATION: NO
DRESSING YOURSELF: NEEDS ASSISTANCE
WALKS IN HOME: NEEDS ASSISTANCE
ASSISTIVE_DEVICE: WALKER
HEARING - RIGHT EAR: FUNCTIONAL
HOME_MANAGEMENT_TIME_ENTRY: 20
TOILETING: NEEDS ASSISTANCE
HEARING - RIGHT EAR: HEARING AID
PATIENT'S MEMORY ADEQUATE TO SAFELY COMPLETE DAILY ACTIVITIES?: YES
LACK_OF_TRANSPORTATION: NO
FEEDING YOURSELF: INDEPENDENT
HOME_MANAGEMENT_TIME_ENTRY: 23
JUDGMENT_ADEQUATE_SAFELY_COMPLETE_DAILY_ACTIVITIES: YES
HEARING - LEFT EAR: HEARING AID
ADEQUATE_TO_COMPLETE_ADL: YES
BATHING: NEEDS ASSISTANCE
ASSISTIVE_DEVICE: OXYGEN;WALKER
DRESSING YOURSELF: NEEDS ASSISTANCE
GROOMING: NEEDS ASSISTANCE
JUDGMENT_ADEQUATE_SAFELY_COMPLETE_DAILY_ACTIVITIES: NO
WALKS IN HOME: NEEDS ASSISTANCE
HEARING - LEFT EAR: FUNCTIONAL
GROOMING: INDEPENDENT
HOME_MANAGEMENT_TIME_ENTRY: 10
FEEDING YOURSELF: INDEPENDENT
TOILETING: NEEDS ASSISTANCE
ADEQUATE_TO_COMPLETE_ADL: YES
PATIENT'S MEMORY ADEQUATE TO SAFELY COMPLETE DAILY ACTIVITIES?: NO

## 2023-01-01 ASSESSMENT — PATIENT HEALTH QUESTIONNAIRE - PHQ9
2. FEELING DOWN, DEPRESSED OR HOPELESS: NOT AT ALL
1. LITTLE INTEREST OR PLEASURE IN DOING THINGS: NOT AT ALL
SUM OF ALL RESPONSES TO PHQ9 QUESTIONS 1 & 2: 0
SUM OF ALL RESPONSES TO PHQ9 QUESTIONS 1 & 2: 0
1. LITTLE INTEREST OR PLEASURE IN DOING THINGS: NOT AT ALL
2. FEELING DOWN, DEPRESSED OR HOPELESS: NOT AT ALL

## 2023-01-01 ASSESSMENT — COLUMBIA-SUICIDE SEVERITY RATING SCALE - C-SSRS
6. HAVE YOU EVER DONE ANYTHING, STARTED TO DO ANYTHING, OR PREPARED TO DO ANYTHING TO END YOUR LIFE?: NO
2. HAVE YOU ACTUALLY HAD ANY THOUGHTS OF KILLING YOURSELF?: NO
2. HAVE YOU ACTUALLY HAD ANY THOUGHTS OF KILLING YOURSELF?: NO
6. HAVE YOU EVER DONE ANYTHING, STARTED TO DO ANYTHING, OR PREPARED TO DO ANYTHING TO END YOUR LIFE?: NO
2. HAVE YOU ACTUALLY HAD ANY THOUGHTS OF KILLING YOURSELF?: NO
1. IN THE PAST MONTH, HAVE YOU WISHED YOU WERE DEAD OR WISHED YOU COULD GO TO SLEEP AND NOT WAKE UP?: NO
1. IN THE PAST MONTH, HAVE YOU WISHED YOU WERE DEAD OR WISHED YOU COULD GO TO SLEEP AND NOT WAKE UP?: NO
6. HAVE YOU EVER DONE ANYTHING, STARTED TO DO ANYTHING, OR PREPARED TO DO ANYTHING TO END YOUR LIFE?: NO
1. IN THE PAST MONTH, HAVE YOU WISHED YOU WERE DEAD OR WISHED YOU COULD GO TO SLEEP AND NOT WAKE UP?: NO

## 2023-01-01 ASSESSMENT — PAIN SCALES - WONG BAKER
WONGBAKER_NUMERICALRESPONSE: NO HURT
WONGBAKER_NUMERICALRESPONSE: NO HURT

## 2023-01-01 ASSESSMENT — PAIN DESCRIPTION - ORIENTATION: ORIENTATION: RIGHT;LEFT

## 2023-01-01 ASSESSMENT — PAIN DESCRIPTION - PAIN TYPE: TYPE: ACUTE PAIN

## 2023-01-01 ASSESSMENT — PAIN DESCRIPTION - LOCATION
LOCATION: KNEE
LOCATION: SACRUM

## 2023-11-13 PROBLEM — B95.62 BACTEREMIA DUE TO METHICILLIN RESISTANT STAPHYLOCOCCUS AUREUS: Status: ACTIVE | Noted: 2023-01-01

## 2023-11-13 PROBLEM — R06.02 SHORTNESS OF BREATH: Status: ACTIVE | Noted: 2023-01-01

## 2023-11-13 PROBLEM — R78.81 BACTEREMIA DUE TO METHICILLIN RESISTANT STAPHYLOCOCCUS AUREUS: Status: ACTIVE | Noted: 2023-01-01

## 2023-11-13 PROBLEM — I50.20 SYSTOLIC HEART FAILURE (MULTI): Status: ACTIVE | Noted: 2023-01-01

## 2023-11-13 PROBLEM — I82.403 DVT, BILATERAL LOWER LIMBS (MULTI): Status: ACTIVE | Noted: 2023-01-01

## 2023-11-13 PROBLEM — R41.82 ALTERED MENTAL STATUS: Status: ACTIVE | Noted: 2023-01-01

## 2023-11-13 NOTE — ED PROVIDER NOTES
HPI   Chief Complaint   Patient presents with    Altered Mental Status     Patient arrives from home with complaints of confusion for the past 2 weeks per family.  Patient present confused a+o to self.  Patient is a+ox4 at baseline per family       Patient is an 83-year-old female, medical history significant for prior PE, DVT, COPD on home oxygen, presents to the emergency department confusion.  Apparently for the past 2 weeks, the patient has been more confused over baseline.  She does admit to cough.  She is also had increasing lower extremity edema.  She describes orthopnea but also has had headache.  She does not think she has had fever.  She denies abdominal pain but has occasional dysuria.  Patient is not clear about all her medical history.      History limited by:  Dementia                      No data recorded                Patient History   Past Medical History:   Diagnosis Date    Acute embolism and thrombosis of other specified deep vein of lower extremity, bilateral (CMS/[facility]) 12/16/2016    Deep vein thrombosis (DVT) of other vein of both lower extremities    Personal history of pulmonary embolism 12/16/2016    History of pulmonary embolism     Past Surgical History:   Procedure Laterality Date    BACK SURGERY  12/16/2016    Back Surgery    KNEE SURGERY  12/16/2016    Knee Surgery Left    MR HEAD ANGIO WO IV CONTRAST  7/8/2016    MR HEAD ANGIO WO IV CONTRAST 7/8/2016 [facility] INPATIENT LEGAC[facility]    MR NECK ANGIO WO IV CONTRAST  7/8/2016    MR NECK ANGIO WO IV CONTRAST 7/8/2016 [facility] INPATIENT LEGACY     No family history on file.  Social History     Tobacco Use    Smoking status: Not on file    Smokeless tobacco: Not on file   Substance Use Topics    Alcohol use: Not on file    Drug use: Not on file       Physical Exam   ED Triage Vitals   Temp Pulse Resp BP   -- -- -- --      SpO2 Temp src Heart Rate Source Patient Position   -- -- -- --      BP Location FiO2 (%)     -- --       Physical Exam  Vitals and  nursing note reviewed.   Constitutional:       General: She is not in acute distress.     Appearance: She is well-developed.   HENT:      Head: Normocephalic and atraumatic.   Eyes:      Extraocular Movements: Extraocular movements intact.      Conjunctiva/sclera: Conjunctivae normal.      Pupils: Pupils are equal, round, and reactive to light.   Cardiovascular:      Rate and Rhythm: Normal rate and regular rhythm.      Heart sounds: No murmur heard.  Pulmonary:      Effort: Pulmonary effort is normal. No respiratory distress.      Breath sounds: Rales present.   Abdominal:      General: Bowel sounds are normal. There is no distension.      Palpations: Abdomen is soft.      Tenderness: There is no abdominal tenderness.   Musculoskeletal:         General: Swelling present.      Cervical back: Normal range of motion and neck supple. No rigidity.   Skin:     General: Skin is warm and dry.      Capillary Refill: Capillary refill takes less than 2 seconds.   Neurological:      Mental Status: She is alert. She is disoriented.      GCS: GCS eye subscore is 4. GCS verbal subscore is 5. GCS motor subscore is 6.      Cranial Nerves: No cranial nerve deficit or facial asymmetry.      Sensory: No sensory deficit.      Coordination: Romberg sign negative.   Psychiatric:         Mood and Affect: Mood normal.         ED Course & MDM   ED Course as of 11/13/23 1531   Mon Nov 13, 2023   1222 Chest x-ray demonstrates mild enlarged cardiac silhouette, no infiltrates, no effusions. [MK]   1230 CBC demonstrates mild leukopenia 3.9.  Hemoglobin of 11.7. [MK]   1255 Lactic acid normal.  Magnesium minimally decreased at 1.56.  Metabolic panel demonstrates elevation of the bicarb consistent with chronic respiratory failure.  Normal creatinine. [MK]   1259 EKG demonstrates atrial fibrillation.  Rate of 76.  Left axis deviation.  Incomplete right bundle branch block.  No acute ischemia.  Occasional PVC. [MK]   1302 Troponin with minimal  elevation of 20. [MK]   1313 COVID and flu are negative.  BNP mildly elevated 185. [MK]   1347 CT head shows no acute intracranial pathology. [MK]      ED Course User Index  [MK] Jourdan Jordan MD         Diagnoses as of 11/13/23 1531   Disorientation   Lower urinary tract infectious disease   Chronic respiratory failure with hypoxia and hypercapnia (CMS/HCC)       Medical Decision Making  Medical Decision Making: I was able to get more history from the family when they arrived.  Apparently, the patient has been more confused over baseline for at least the past week.  She did have a fall on Friday but no reported head injury.  Family states she was very confused on that Friday.  She was also confused all night last night.  She has been having some erratic behavior.  They deny any focal weakness.  Patient has been on her Eliquis and has been compliant with it.  She is normally on 3 L of supplemental oxygen.  Broad metabolic work-up was pursued.  Blood gas shows some mild hypercapnia, but no significant acidosis.  Rest of her labs were essentially unremarkable.  Urine does show some hematuria but no whites or nitrites.  Patient is covered with Rocephin pending culture.  At this point, given confusion and generalized malaise I do feel the patient would benefit from admission.    EKG interpreted by myself (ED attending physician): Reviewed    Differential Diagnoses Considered: Stroke, infection, sepsis, COVID, hypercapnia    Chronic Medical Conditions Significantly Affecting Care: History of pulmonary embolus, DVT, chronic respiratory failure    External Records Reviewed: I reviewed recent and relevant outside records including: Outpatient medication reconciliation  Independent Interpretation of Studies:  I independently interpreted: Chest x-ray and head CT obtained and reviewed    Escalation of Care:  Appropriate for hospitalization    Social Determinants of Health Significantly Affecting Care:  No social  determinants    Prescription Drug Consideration: IV antibiotics    Diagnostic testing considered: [PERC, D-Dimer, PECARN, etc.]    Discussion of Management with Other Providers:   I discussed the patient/results with: Admitting provider agrees plan of care          Procedure  Procedures     Jourdan Jordan MD  11/13/23 7436

## 2023-11-13 NOTE — PROGRESS NOTES
Pharmacy Medication History Review    Zayda Grace is a 83 y.o. female admitted for No Principal Problem: There is no principal problem currently on the Problem List. Please update the Problem List and refresh.. Pharmacy reviewed the patient's [id number] medications and allergies for accuracy.    The list below reflectives the updated PTA list. Please review each medication in order reconciliation for additional clarification and justification.  (Not in a hospital admission)       The list below reflectives the updated allergy list. Please review each documented allergy for additional clarification and justification.  Allergies  Reviewed by Stefany Gale CPhT on 11/13/2023        Severity Reactions Comments    Codeine High Hives, Shortness of breath     Etodolac Low Swelling     Hydromorphone Low Nausea And Vomiting     Lisinopril Low Cough     Tramadol Low Other Slurring words, unable to tolerate            Below are additional concerns with the patient's PTA list.    See PTA med list    Stefany Gale CPhT

## 2023-11-13 NOTE — H&P
History Of Present Illness  Zayda Grace is a 83 y.o. female presenting with past medical history significant for prior PE was (2016), DVT (2016), COPD on 3 L home oxygen, presents to the emergency department confusion.  Daughter is at bedside supplementing history.  Family mentioned she has been progressively more confused and having multiple falls over the past month. She normally sundown's after 3 PM however her mentation has changed from baseline.  Her last fall was on Friday evening and she reported not hitting her head or having a loss of consciousness.  Patient will normally fall from her bed, which she will then call her daughter.  Patient denies chest pain, shortness of breath.  She states she has been having normal oral intake over the past few weeks however daughter thinks that it has declined.  Family have pushed for assisted living in the past however daughter refuses. She lives alone and is able to complete most of her activities of daily living according to her daughters, however are concerned because of this decline in mentation.  Patient sometimes thinks that her  lives with her even though he has past 23 years ago.  Patient endorses left knee pain unrelated to fall that has been present for a long time.    On arrival vitals are hemodynamically stable on 3 L home oxygen.  Pertinent labs include WBC 3.9, hemoglobin 11.7, magnesium 1.56, 0.75 creatinine, troponin 20, .  EKG shows A-fib, left axis deviation, and incomplete right bundle branch block.  CXR shows mild enlarged cardiac silhouette.  CT head shows no acute pathology.     Past Medical History  As noted above    Surgical History  She has a past surgical history that includes Back surgery (12/16/2016); Knee surgery (12/16/2016); MR angio head wo IV contrast (7/8/2016); and MR angio neck wo IV contrast (7/8/2016).     Social History  1.5 pack lifelong smoker, quit 20 years ago    Family History  No family history on file.      Allergies  Codeine, Etodolac, Hydromorphone, Lisinopril, and Tramadol     Physical Exam    General: No acute distress, alert, awake  HEENT: Normocephalic atraumatic  Neck: No JVD, no carotid bruit  Cardiovascular: RRR, no murmurs, no hepatojugular reflex  Pulmonary: Clear to auscultation bilaterally, no rales/rhonchi  Abdomen: Normal bowel sounds in all 4 quadrants, soft  Extremities: Bilateral erythematous scaling, no pitting edema  Neuro: No focal deficits  Psych: Normal affect, no hallucinations  Skin: Warm, no rashes       Last Recorded Vitals  /70   Pulse 66   Temp 36.6 °C (97.9 °F) (Temporal)   Resp 16   Wt 72.6 kg (160 lb)   SpO2 96%       Assessment/Plan     #Recurrent falls  #Progressive mental/cognitive decline over the past month  #Acute metabolic encephalopathy    -Per patient's daughters, noted that she been having recurrent falls, most recent falls on Friday did not sustain any injury to the head.  -Family noted that patient is progressively getting confused usually around 3 PM started to sundown.  Does live alone and take care of medications  -Patient is alert and oriented x2.  Plan:  -PT OT  -Social work consult for placement to assisted living facility  -Avoid hospital associated delirium.  Maintain normal sleep-wake cycle      #UTI, ruled out    -In the emergency department, patient received 1 g IV ceftriaxone empirically for concerning UTI presentation  -UA negative for any signs of UTI.  Patient is afebrile, no identifiable infectious etiology noted at this time.  We will discontinue antibiotics and watch for any signs and symptoms of infection.  Plan:  -We will obtain a renal ultrasound to ensure patient does not have any urinary retention with hydronephrosis.  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  -Blood and urine cultures pending    #Congestive diastolic heart failure ejection fraction 50 to 55%  #Bilateral lower extremity edema secondary to above  #Atrial fibrillation  #Hypertension  #Hyperlipidemia    -Patient had a echocardiogram done from [facility] however records are not available within the computer.  I was able to review the records provided by the family members which showed ejection fraction 50 to 55% with dilated left and right atria and mild aortic valve stenosis.  -We will repeat echocardiogram  -Continue digoxin, diltiazem, losartan and atorvastatin.  -IV Lasix 40 mg twice daily.  Patient takes 80 mg at home however has missed a few doses over the past few days.  -Consult cardiology    #COPD, 3 L at baseline  #Pulmonary hypertension  #History of DVT/PE   -At baseline patient is on 3 L nasal cannula  -Patient has recent diagnosis of pulmonary hypertension however did not have any formal work-up.  She does follow-up with a pulmonologist and was recommended to see a cardiologist however patient had not had the time to make appointment.  Plan:  -Continue patient on home oxygen  -Incentive spirometry  -DuoNebs as needed  -Continue Spiriva home inhaler  -Continue Eliquis     #Hypomagnesemia  -Replete as necessary >2        DVT prophylaxis: Home Eliquis  Diet: Easy to chew diet  Consults: PT/OT, social work  Code: DNR/DNI  Dispo: Telemetry      Shadia Carey DO

## 2023-11-14 NOTE — PROGRESS NOTES
Physical Therapy    Physical Therapy Evaluation    Patient Name: Zayda Grace  MRN: [id number]  Today's Date: 11/14/2023   Time Calculation  Start Time: 1052  Stop Time: 1116  Time Calculation (min): 24 min    Assessment/Plan   PT Assessment  PT Assessment Results: Decreased endurance, Impaired balance, Decreased mobility, Decreased cognition, Decreased safety awareness  End of Session Communication: Bedside nurse  End of Session Patient Position: On cart  IP OR SWING BED PT PLAN  Inpatient or Swing Bed: Inpatient  PT Plan  Treatment/Interventions: Bed mobility, Transfer training, Gait training  PT Plan: Skilled PT  PT Frequency: 3 times per week  PT Discharge Recommendations: Moderate intensity level of continued care  PT Recommended Transfer Status: Assist x2  PT - OK to Discharge: Yes      Subjective   General Visit Information:  General  Reason for Referral: R/O stroke,sepsis,hypercapniaA-fib  Referred By: Aliyah  Past Medical History Relevant to Rehab: PE,home O2 5l/cont,DVT,CHF,COPD,OA,dementia  Family/Caregiver Present: Yes  Prior to Session Communication: Bedside nurse  Patient Position Received: On cart  Home Living:  Home Living  Type of Home: House  Lives With: Alone  Home Adaptive Equipment: Walker rolling or standard  Home Layout: Multi-level  Home Access:  (ramp)  Prior Level of Function:  Prior Function Per Pt/Caregiver Report  Level of [place]: Independent with ADLs and functional transfers, Independent with homemaking with ambulation (laundry per family)  Ambulatory Assistance: Independent  Precautions:  Precautions  Precautions Comment:  (fall risk)  Vital Signs:       Objective   Pain:  Pain Assessment  Pain Score: 8 (ankles/knees)  Cognition:  Cognition  Overall Cognitive Status:  (some confusion/oriented x 3)    General Assessments:    Functional Assessments:  Bed Mobility  Bed Mobility: Yes (mod assist x 2/ED cart)    Transfers  Transfer: Yes (mod x 2)    Ambulation/Gait  Training  Ambulation/Gait Training Performed: Yes (3 ft sidestep/wh walker/mod x 2)  Extremity/Trunk Assessments:    Outcome Measures:  [facility] Basic Mobility  Turning from your back to your side while in a flat bed without using bedrails: A lot  Moving from lying on your back to sitting on the side of a flat bed without using bedrails: A lot  Moving to and from bed to chair (including a wheelchair): A lot  Standing up from a chair using your arms (e.g. wheelchair or bedside chair): A lot  To walk in hospital room: Total  Climbing 3-5 steps with railing: Total  Basic Mobility - Total Score: 10    Encounter Problems       Encounter Problems (Active)       PT Problem       bed mob       Start:  11/14/23    Expected End:  12/05/23       Min assist 1-2 bed mob           abbott       Start:  11/14/23    Expected End:  12/05/23       Min assist 1-2 abbott           gait       Start:  11/14/23    Expected End:  12/05/23       Amb 10 ft> w/ a wh walker and min assist 1-2                  Education Documentation  Mobility Training, taught by Mihai Lee, PT at 11/14/2023  1:26 PM.  Learner: Patient  Readiness: Acceptance  Method: Explanation  Response: Verbalizes Understanding    Body Mechanics, taught by Mihai Lee, PT at 11/14/2023  1:26 PM.  Learner: Patient  Readiness: Acceptance  Method: Explanation  Response: Verbalizes Understanding    Education Comments  No comments found.

## 2023-11-14 NOTE — PROGRESS NOTES
"SUBJECTIVE  Denies lightheadedness/dizziness.  Patient is unwilling to stay with daughter Karen who is ready to have patient live with her.  I discussed assisted living with patient and daughter in room.    OBJECTIVE    Physical Exam:  General:  Pleasant and cooperative. No apparent distress.  HEENT:  Normocephalic, atraumatic  Chest:  Clear to auscultation bilaterally. No wheezes, rales, or rhonchi.  CV:  Regular rate and rhythm. No murmurs    Abdomen: Abdomen is soft, non-tender, non-distended. BS +   Extremities:  No lower extremity edema or cyanosis.   Neurological: No focal deficits.  Skin:  Warm and dry.      Last Recorded Vitals  Blood pressure 129/71, pulse 70, temperature 37 °C (98.6 °F), temperature source Temporal, resp. rate 25, height 1.549 m (5' 1\"), weight 72.6 kg (160 lb), SpO2 99 %.  Intake/Output last 3 Shifts:  No intake/output data recorded.    Last CBC & BMP  Lab Results   Component Value Date    GLUCOSE 83 11/14/2023    CALCIUM 8.8 11/14/2023     11/14/2023    K 3.5 11/14/2023    CO2 38 (H) 11/14/2023    CL 97 (L) 11/14/2023    BUN 10 11/14/2023    CREATININE 0.65 11/14/2023     Lab Results   Component Value Date    WBC 3.2 (L) 11/14/2023    HGB 10.4 (L) 11/14/2023    HCT 33.6 (L) 11/14/2023    MCV 97 11/14/2023     11/14/2023       ASSESSMENT & PLAN  Zayda Grace is a 83 y.o. female presenting with past medical history significant for prior PE was (2016), DVT (2016), COPD on 3 L home oxygen, presents to the emergency department confusion.  Daughter is at bedside supplementing history.  Family mentioned she has been progressively more confused and having multiple falls over the past month. She normally sundown's after 3 PM however her mentation has changed from baseline.  Her last fall was on Friday evening and she reported not hitting her head or having a loss of consciousness.     Daily Progress:  11/14: PT/OT [facility] [address] 10/11.  Neuro recommends MRI brain without contrast, checking " B12, folate, B1, TSH.  MoCA test pending.  Started home oxybutynin after UTI rule out.      #Recurrent falls  #Progressive mental/cognitive decline over the past month  #Acute metabolic encephalopathy  -Per patient's daughters, noted that she been having recurrent falls, most recent falls on Friday did not sustain any injury to the head.  -Family noted that patient is progressively getting confused usually around 3 PM started to sundown.  Does live alone and take care of medications  -Patient is alert and oriented x2.  Plan  -PT OT  -Avoid hospital associated delirium.  Maintain normal sleep-wake cycle  -Social work consult for placement to assisted living facility  -Neurology recommends MRI brain without contrast, B12, folate, B1, TSH.  MoCA test pending        #UTI, ruled out  -In ED, received 1 g IV ceftriaxone empirically for concerning UTI presentation  -UA negative for any signs of UTI.  Patient is afebrile, no identifiable infectious etiology noted at this time.  We will discontinue antibiotics and watch for any signs and symptoms of infection.  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  Plan  -Blood cultures pending     #Congestive diastolic heart failure ejection fraction 50 to 55%  #Bilateral lower extremity edema secondary to above  #Atrial fibrillation  #Hypertension  #Hyperlipidemia  -11/14 echo shows EF 45-50%, moderate to severely elevated right ventricular systolic pressure  -Patient had a echocardiogram done from [facility] [place], operated by [facility] however records are not available within the computer.  I was able to review the records provided by the family members which showed ejection fraction 50 to 55% with dilated left and right atria and mild aortic valve stenosis.  Plan  -Continue home 125 mcg  digoxin, 120 Mg diltiazem, 25 mg losartan and 10 mg atorvastatin.  -IV Lasix 40 mg twice daily (home dose 80 mg daily, missed doses past few days)  -Cardiology consult pending  -Podiatry consult pending for foot care/mild ulcerations causing pain (Voltaren gel ordered)     #COPD, 3 L at baseline  #Pulmonary hypertension  #History of DVT/PE   -At baseline patient is on 3 L nasal cannula  -Patient has recent diagnosis of pulmonary hypertension however did not have any formal work-up.  She does follow-up with a pulmonologist and was recommended to see a cardiologist however patient had not had the time to make appointment.  Plan  -Continue patient on 2 L home oxygen  -Supportive therapy; Incentive spirometry, home Spiriva inhaler, and,  DuoNebs as needed  -Continue Eliquis        #Hypomagnesemia  -Replete as necessary >2       #Depression  #GERD  #Incontinence  -Continue home sertraline 50 mg daily  -Continue home omeprazole 40 Mg  -Continue home oxybutynin 10mg every day        DVT prophylaxis: Home Eliquis  Diet: Easy to chew diet  Consults: Cardiology, neurology, PT/OT ([facility] 10/11), social work  Code: DNR/DNI  Dispo: Telemetry       Shadia Carey, DO  PGY-1, Internal Medicine  Please SecureChat for any further questions  This is a preliminary note, please await attending attestation for final A/P

## 2023-11-14 NOTE — PROGRESS NOTES
Occupational Therapy    Evaluation    Patient Name: Zayda Grace  MRN: [id number]  Today's Date: 11/14/2023  Time Calculation  Start Time: 1051  Stop Time: 1115  Time Calculation (min): 24 min    Assessment  IP OT Assessment  OT Assessment: Patient would benefit from further OT to address ADL's and functional transfers/mobility due to decline in baseline function.  Hospital admit 11/13/2023 due to altered mental status, disorientation, chronic respiratory failure, hypoxia, hypercapnia, SOB; multiple falls, metabolic encephalopathy  Prognosis: Good  Evaluation/Treatment Tolerance: Patient limited by fatigue, Patient limited by pain    Plan:  Treatment Interventions: ADL retraining, Functional transfer training, Endurance training, Compensatory technique education, Patient/family training, Equipment evaluation/education (energy conservation/diaphragmatic breathing techniques training)  OT Frequency: 3 times per week  OT Discharge Recommendations: Moderate intensity level of continued care  OT - OK to Discharge: Yes (to next level of care when medically cleared by physician/medical team)    Subjective   Current Problem:  1. Disorientation        2. Lower urinary tract infectious disease        3. Chronic respiratory failure with hypoxia and hypercapnia (CMS/HCC)        4. Chronic systolic heart failure (CMS/HCC)  Transthoracic Echo (TTE) Complete    Transthoracic Echo (TTE) Complete        General:  General  Reason for Referral: ADL, safety assessment  Referred By: RICHIE Kay MD  Prior to Session Communication: Bedside nurse  Patient Position Received: Bed, 2 rail up, Alarm off, not on at start of session  General Comment: Patient seen in room AC-19 with daughter present; cooperative, motivated  Precautions:  Medical Precautions: Fall precautions  Precautions Comment: O2 nasal cannula, [place]     Pain:  Pain Assessment  Pain Assessment: 0-10  Pain Score: 8  Pain Location: Knee  Pain Orientation:  (bilateral chronic also at  ankles; right LE throbbing)    Objective   Cognition:  Orientation Level: Disoriented to place, Disoriented to time, Disoriented to situation (however noted confusion thus required re-direction which patient was appreciative)  Processing Speed: Delayed    Home Living:  Type of Home: House  Lives With: Alone (supportive son and daughter who assist as needed)  Home Adaptive Equipment: Walker rolling or standard  Home Layout: Two level (colonial)  Home Access: Ramped entrance  Bathroom Shower/Tub:  (does not use since sponge bathes)  Bathroom Toilet: Adaptive toilet seating (raised toilet with rails)    Prior Function:  Level of [place]: Independent with ADLs and functional transfers, Independent with homemaking with ambulation  Ambulatory Assistance: Independent (using wheeled walker)  Hand Dominance: Right     ADL:  LE Dressing Assistance: Total (anticipate)  ADL Comments: anticipate carryover assist for self-care including lower body dressing, bathing, toileting.  Would benefit from further addressing in OT sessions and trialing use of ADL adaptive equipment to facilitate indep and ease in performance.    Activity Tolerance:  Endurance:  (fair since easily fatigues)  Bed Mobility/Transfers: Bed Mobility  Bed Mobility: Yes  Bed Mobility 1  Bed Mobility 1: Supine to sitting, Sitting to supine  Level of Assistance 1: Moderate assistance, Maximum verbal cues, Maximum tactile cues (of 2)    Transfers  Transfer: Yes  Transfer 1  Transfer From 1: Stand to, Sit to  Transfer to 1:  (ED cart)  Transfer Device 1: Walker  Transfer Level of Assistance 1: +2, Maximum verbal cues, Maximum tactile cues    Ambulation/Gait Training:  Ambulation/Gait Training  Ambulation/Gait Training Performed: Yes  Ambulation/Gait Training 1  Device 1: Rolling walker  Assistance 1: Moderate assistance, Maximum verbal cues, Maximum tactile cues (of 2 staff)  Sitting Balance:  Static Sitting Balance  Static Sitting-Comment/Number of Minutes: fair  (+)  Standing Balance:  Static Standing Balance  Static Standing-Comment/Number of Minutes: poor overall    Sensation:  Light Touch: No apparent deficits     Extremities: RUE   RUE : Within Functional Limits (grossly observed elbow/hand) and LUE   LUE: Within Functional Limits (grossly observed elbow/hand)    Outcome Me[facility]: [facility]  Putting on and taking off regular lower body clothing: Total  Bathing (including washing, rinsing, drying): A lot  Putting on and taking off regular upper body clothing: A lot  Toileting, which includes using toilet, bedpan or urinal: Total  Taking care of personal grooming such as brushing teeth: A lot  Eating Meals: A little  Daily Activity - Total Score: 11      Education Documentation  Body Mechanics, taught by Selma Rogers OT at 11/14/2023  1:22 PM.  Learner: Patient  Readiness: Acceptance  Method: Explanation  Response: Verbalizes Understanding, Demonstrated Understanding, Needs Reinforcement  Comment: training in mobility techniques for safety; required cues due to decrease insight and awareness    Goals:   Encounter Problems       Encounter Problems (Active)       OT Goals       Patient with complete upper and lower body bathing/dressing and toileting with minimal assist using adaptive equipment as needed        Start:  11/14/23    Expected End:  11/28/23            Patient will perform bed mobility and functional transfers safely and minimal assist: bed, chair, commode using DME as needed        Start:  11/14/23    Expected End:  11/28/23            Patient will tolerate standing for 8 mins. and show good (-) sitting and standing balance during ADL's and functional transfers/mobility        Start:  11/14/23    Expected End:  11/28/23            apply energy conservation/diaphragmatic breathing techniques to ADL's and functional transfers with minimal cues        Start:  11/14/23    Expected End:  11/28/23

## 2023-11-14 NOTE — CONSULTS
Inpatient consult to Neurology  Consult performed by: Korin Spencer DO  Consult ordered by: Jourdan Ly MD          History Of Present Illness  Zayda Grace is a 83 y.o. female presenting with confusion. History obtained from chart review and patient as family was not available at bedside. Per the admission note the patient has had increased confusion and falls over the last few weeks. The patient describes a couple of episodes of mechanical falls over the last few weeks.  Per the admission note the patient reportedly has normal sundowning behavior after 3 PM however family allows her to continue living independently.  Does not seem that she has been evaluated for dementia in the past.  She reportedly does live alone and is able to complete most of her activities of daily living.  Patient does report that she feels that her memory is getting worse and does state that she is confused at times.  She no longer drives.  She does walk with a walker due to imbalance and reports that her falls are due to tripping or slipping.        Past Medical History  Past Medical History:   Diagnosis Date    Acute embolism and thrombosis of other specified deep vein of lower extremity, bilateral (CMS/[facility]) 12/16/2016    Deep vein thrombosis (DVT) of other vein of both lower extremities    Personal history of pulmonary embolism 12/16/2016    History of pulmonary embolism     Surgical History  Past Surgical History:   Procedure Laterality Date    BACK SURGERY  12/16/2016    Back Surgery    KNEE SURGERY  12/16/2016    Knee Surgery Left    MR HEAD ANGIO WO IV CONTRAST  7/8/2016    [place] ANGIO WO [place] [facility] [place]    MR NECK ANGIO WO IV CONTRAST  7/8/2016    MR NECK ANGIO WO IV CONTRAST [place] [facility] [place]     Social History     Allergies  Codeine, Etodolac, Hydromorphone, Lisinopril, and Tramadol  (Not in a hospital admission)      Review of Systems   Musculoskeletal:  Positive for gait problem.  "  Neurological:  Negative for headaches.   Psychiatric/Behavioral:  Positive for confusion.    All other systems reviewed and are negative.    Neurological Exam  Physical Exam  Last Recorded Vitals  Blood pressure 101/66, pulse 56, temperature 37 °C (98.6 °F), temperature source Temporal, resp. rate (!) 28, height 1.549 m (5' 1\"), weight 72.6 kg (160 lb), SpO2 100 %.      On general examination, the patient is well appearing and well groomed. Heart is regular, rate and rhythm. Lungs are clear to ausculation bilaterally. There is bilateral peripheral edema with venous stasis skin changes. Normal pedal pulses bilaterally. No carotid bruits.   On neurologic examination, the history is related in quite good detail with no obvious deficit of attention, memory or language. Fund of knowledge is adequate. Orientation is intact to person, place and time. Spontaneous speech is fluent with no paraphasic or aphasic errors. On cranial nerve exam, the pupils are equal, round and reactive to light. Extraocular movements are full, without nystagmus.  Visual fields are full to confrontation. The fundi are benign with normal sharp disc margins. There is no bulbofacial weakness or ptosis.  The tongue is midline with no wasting or fasciculations. The palate elevates symmetrically. Facial sensation is intact to light touch and pinprick bilaterally. Hearing is intact to finger rub bilaterally. Shoulder shrug is 5/5 bilaterally.  Neck flexion and extension have full strength. Motor examination reveals normal tone and bulk in the upper and lower extremities bilaterally. There are no fasciculations. There is full strength in the proximal and distal muscles of the upper and lower extremities bilaterally. Deep tendon reflexes are 2/4 and symmetric throughout the upper and lower extremities bilaterally, including the ankle jerks. Plantar responses are flexor bilaterally. Fine finger movements and rapid alternating movements are done well in " both hands. There is no tremor. On coordination testing, finger-nose-finger testing are done well bilaterally. Sensory examination reveals normal light touch sensation in the distal upper and lower extremities bilaterally. Gait is deferred.    Relevant Results          [place] Coma Scale  Best Eye Response: Spontaneous  Best Verbal Response: Confused  Best Motor Response: Follows commands  [place] Coma Scale Score: 14                 I have personally reviewed the following imaging results Transthoracic Echo (TTE) Complete    Result Date: 11/14/2023    [facility], [address] [place] [address] [id number] [phone] and                                 Fax [phone] TRANSTHORACIC ECHOCARDIOGRAM REPORT  Patient Name:      BAM GONZALEZ HALLE    Reading Physician:    [id number] Killian Carolina MD Study Date:        11/14/2023           Ordering Provider:    [id number] TERI MCLAIN MRN/PID:           [id number]             Fellow: Accession#:        [id number]         Nurse: Date of Birth/Age: 1940 / 83 years Sonographer:          Sinan Benavides BOAZ Gender:            F                    Additional Staff: Height:            154.94 cm            Admit Date:           11/13/2023 Weight:            72.58 kg             Admission Status:     Emergency BSA:               1.72 m2              Encounter#:           [id number]                                         Department Location:  [facility] Blood Pressure: 129 /63 mmHg Study Type:    TRANSTHORACIC ECHO (TTE) COMPLETE Diagnosis/ICD: Chronic systolic (congestive) heart failure (CHF)-I50.22 CPT Code:      Echo Complete w Full Doppler-13797; Myocardial Strain                Imaging-[id number] Patient History: Pertinent History: A-Fib, Cardiomyopathy, CHF, Previous DVT, Hyperlipidemia,                    COPD and Dyspnea. bacteremia. Study  Detail: The following Echo studies were performed: 2D, M-Mode, Doppler,               color flow and Strain. Technically challenging study due to body               habitus and the patient's lack of cooperation.  PHYSICIAN INTERPRETATION: Left Ventricle: The left ventricular systolic function is mildly decreased, with an estimated ejection fraction of 45-50%. There is global hypokinesis of the left ventricle with minor regional variations. The left ventricular cavity size is normal. Left Ventricular Global Longitudinal Strain - 15.9 %. Spectral Doppler shows an impaired relaxation pattern of left ventricular diastolic filling. Left Atrium: The left atrium is severely dilated. Right Ventricle: The right ventricle is mildly enlarged. There is mildly reduced right ventricular systolic function. Right Atrium: The right atrium is mildly dilated. Aortic Valve: The aortic valve is probably trileaflet. There is evidence of mild aortic valve stenosis. There is no evidence of aortic valve regurgitation. The peak instantaneous gradient of the aortic valve is 19.2 mmHg. The mean gradient of the aortic valve is 10.0 mmHg. Mitral Valve: The mitral valve is mildly thickened. There is mild to moderate mitral valve regurgitation. Tricuspid Valve: The tricuspid valve is abnormal. There is moderate tricuspid regurgitation. The Doppler estimated RVSP is moderate to severely elevated at 45.0 mmHg. Pulmonic Valve: The pulmonic valve is structurally normal. There is trace pulmonic valve regurgitation. Pericardium: There is no pericardial effusion noted. Aorta: The aortic root is normal. There is mild dilatation of the ascending aorta. There is mild dilatation of the aortic root. Systemic Veins: The inferior vena cava appears dilated.  CONCLUSIONS:  1. Left ventricular systolic function is mildly decreased with a 45-50% estimated ejection fraction.  2. Spectral Doppler shows an impaired relaxation pattern of left ventricular diastolic  filling.  3. There is mildly reduced right ventricular systolic function.  4. The left atrium is severely dilated.  5. Mild to moderate mitral valve regurgitation.  6. The tricuspid valve is abnormal.  7. Moderate to severely elevated right ventricular systolic pressure.  8. Moderate tricuspid regurgitation visualized.  9. Mild aortic valve stenosis. 10. There is global hypokinesis of the left ventricle with minor regional variations. QUANTITATIVE DATA SUMMARY: 2D MEASUREMENTS:                          Normal Ranges: LAs:           5.00 cm   (2.7-4.0cm) IVSd:          1.01 cm   (0.6-1.1cm) LVPWd:         0.95 cm   (0.6-1.1cm) LVIDd:         4.63 cm   (3.9-5.9cm) LVIDs:         3.47 cm LV Mass Index: 90.9 g/m2 LV % FS        25.1 % LA VOLUME:                             Normal Ranges: LA Volume Index: 62.0 ml/m2 RA VOLUME BY A/L METHOD:                       Normal Ranges: RA Area A4C: 28.0 cm2 M-MODE MEASUREMENTS:                  Normal Ranges: Ao Root: 3.80 cm (2.0-3.7cm) LV SYSTOLIC FUNCTION BY 2D PLANIMETRY (MOD):                                          Normal Ranges: EF-A4C View:                      52.4 % (>=55%) EF-A2C View:                      48.3 % EF-Biplane:                       50.7 % Global Longitudinal Strain (GLS): 15.9 % LV DIASTOLIC FUNCTION:                     Normal Ranges: MV Peak E: 0.99 m/s (0.7-1.2 m/s) MV Peak A: 0.37 m/s (0.42-0.7 m/s) E/A Ratio: 2.69     (1.0-2.2) MITRAL VALVE:                 Normal Ranges: MV DT: 185 msec (150-240msec) AORTIC VALVE:                                    Normal Ranges: AoV Vmax:                2.19 m/s  (<=1.7m/s) AoV Peak P.2 mmHg (<20mmHg) AoV Mean PG:             10.0 mmHg (1.7-11.5mmHg) LVOT Max Linwood:            1.11 m/s  (<=1.1m/s) AoV VTI:                 43.10 cm  (18-25cm) LVOT VTI:                21.50 cm LVOT Diameter:           2.10 cm   (1.8-2.4cm) AoV Area, VTI:           1.73 cm2  (2.5-5.5cm2) AoV Area,Vmax:           1.76  cm2  (2.5-4.5cm2) AoV Dimensionless Index: 0.50  RIGHT VENTRICLE: RV Basal 4.66 cm RV Mid   3.04 cm RV Major 8.5 cm TAPSE:   13.4 mm TRICUSPID VALVE/RVSP:                             Normal Ranges: Peak TR Velocity: 3.24 m/s RV Syst Pressure: 45.0 mmHg (< 30mmHg) PULMONIC VALVE:                      Normal Ranges: PV Max Linwood: 1.3 m/s  (0.6-0.9m/s) PV Max P.3 mmHg  [id number] Killian Carolina MD Electronically signed on 2023 at 1:57:36 PM  ** Final **     US renal complete    Result Date: 2023  INDICATION:  Hydronephrosis. TECHNIQUE:  Ultrasound of the kidneys and bladder. COMPARISON:  None available FINDINGS: The right kidney measures 9.8 cm in length.  Renal cortical thinning is present.  There is no hydronephrosis.  There is a single stone located within the lower pole measuring 0.5 cm.  There are multiple renal cysts present with the largest located within the midportion and measuring 1.2 x 1.1 x 1.3 cm. The left kidney measures 8.2 cm in length.  Mild renal cortical thinning is present.  There is no hydronephrosis.  There are no stones.  There is a simple cyst within the midportion measuring 1.0 x 0.9 x 1.0 cm. The bladder is partially distended.  The bilateral ureteral jets are not visualized.     Nonobstructing right calculus measuring 0.5 cm. Bilateral renal cortical thinning.  No hydronephrosis bilaterally. Bilateral simple cysts. Signed by Hiro Johnson MD    CT head wo IV contrast    Result Date: 2023  Interpreted By:  Tommy Benites, STUDY: CT HEAD WO IV CONTRAST;  2023 1:00 pm   INDICATION: confusion.   COMPARISON: None.   ACCESSION NUMBER(S): [id number]   ORDERING CLINICIAN: TERI MCLAIN   TECHNIQUE: Noncontrast axial CT scan of head was performed. Angled reformats in brain and bone windows were generated. The images were reviewed in bone, brain, blood and soft tissue windows.   FINDINGS: CSF Spaces: The ventricles, sulci and basal cisterns are within normal limits.  There is no extraaxial fluid collection.   Parenchyma: Significant supratentorial atrophy. The grey-white differentiation is intact. There is no mass effect or midline shift. There is no intracranial hemorrhage.   Calvarium: No acute displaced calvarial fracture.   Paranasal sinuses and mastoids: Visualized paranasal sinuses and mastoids are clear.       No CT evidence of acute intracranial pathology.       MACRO: None       Signed by: Tommy Benites 11/13/2023 1:44 PM Dictation workstation:   [id number]    XR chest 1 view    Result Date: 11/13/2023  Interpreted By:  Tommy Benites, STUDY: XR CHEST 1 VIEW;  11/13/2023 11:43 am   INDICATION: Signs/Symptoms:sob.   COMPARISON: Chest radiograph 11/21/2017   ACCESSION NUMBER(S): [id number]   ORDERING CLINICIAN: TERI MCLAIN   FINDINGS:     CARDIOMEDIASTINAL SILHOUETTE: Cardiomediastinal silhouette is stable in size and configuration.   LUNGS: No consolidation, pneumothorax, or significant effusion. Mild bilateral diffuse reticular changes, favored to be chronic   ABDOMEN: No remarkable upper abdominal findings.   BONES: No acute osseous changes.       1.  No evidence of acute cardiopulmonary process.     Signed by: Tommy Benites 11/13/2023 12:00 PM Dictation workstation:   [id number]       Assessment/Plan   Principal Problem:    Altered mental status  Active Problems:    Shortness of breath      Ms. Grace is a 83 year old woman whom presented with confusion.  Exam at bedside shows oriented x3 with no focal deficit. Based on the history provided in the admission note that the patient has a history of sundowning at baseline, suspect there is a degree of underlying dementia that likely hit a point to make living independently unacceptable.  Recommend further evaluation with MRI brain, blood work to look for reversible causes of memory impairment and occupational therapy evaluation for MOCA.    Recommendations:  -MRI brain w/o contrast  -check B12, folate, B1,  TSH  -Occupational therapy for MOCA test.              Korin Spencer DO

## 2023-11-14 NOTE — PROGRESS NOTES
"SUBJECTIVE  Denies lightheadedness/dizziness.  Patient is unwilling to stay with daughter Karen who is ready to have patient live with her.  I discussed assisted living with patient and daughter in room.    OBJECTIVE    Physical Exam:  General:  Pleasant and cooperative. No apparent distress.  HEENT:  Normocephalic, atraumatic  Chest:  Clear to auscultation bilaterally. No wheezes, rales, or rhonchi.  CV:  Regular rate and rhythm. No murmurs    Abdomen: Abdomen is soft, non-tender, non-distended. BS +   Extremities:  No lower extremity edema or cyanosis.   Neurological: No focal deficits.  Skin:  Warm and dry.      Last Recorded Vitals  Blood pressure 110/51, pulse 65, temperature 37 °C (98.6 °F), temperature source Temporal, resp. rate 20, height 1.549 m (5' 1\"), weight 72.6 kg (160 lb), SpO2 99 %.  Intake/Output last 3 Shifts:  No intake/output data recorded.    Last CBC & BMP  Lab Results   Component Value Date    GLUCOSE 83 11/14/2023    CALCIUM 8.8 11/14/2023     11/14/2023    K 3.5 11/14/2023    CO2 38 (H) 11/14/2023    CL 97 (L) 11/14/2023    BUN 10 11/14/2023    CREATININE 0.65 11/14/2023     Lab Results   Component Value Date    WBC 3.2 (L) 11/14/2023    HGB 10.4 (L) 11/14/2023    HCT 33.6 (L) 11/14/2023    MCV 97 11/14/2023     11/14/2023       ASSESSMENT & PLAN  Zayda Grace is a 83 y.o. female presenting with past medical history significant for prior PE was (2016), DVT (2016), COPD on 3 L home oxygen, presents to the emergency department confusion.  Daughter is at bedside supplementing history.  Family mentioned she has been progressively more confused and having multiple falls over the past month. She normally sundown's after 3 PM however her mentation has changed from baseline.  Her last fall was on Friday evening and she reported not hitting her head or having a loss of consciousness.     Daily Progress:  11/14: PT/OT [facility] 10/11.  Neuro recommends MRI brain without contrast, checking " B12, folate, B1, TSH.  MoCA test pending.  Cardiology consult pending. Podiatry consult pending for foot care/mild ulcerations causing pain (Voltaren gel ordered). Long-term placement pending.    #Recurrent falls  #Progressive mental/cognitive decline over the past month  #Acute metabolic encephalopathy  -Per patient's daughters, noted that she been having recurrent falls, most recent falls on Friday did not sustain any injury to the head.  -Family noted that patient is progressively getting confused usually around 3 PM started to sundown.  Does live alone and take care of medications  -Patient is alert and oriented x2.  Plan  -PT OT  -Avoid hospital associated delirium.  Maintain normal sleep-wake cycle  -Social work consult for placement to assisted living facility  -Neurology recommends MRI brain without contrast, B12, folate, B1, TSH.  MoCA test pending        #UTI, ruled out  -In ED, received 1 g IV ceftriaxone empirically for concerning UTI presentation  -UA negative for any signs of UTI.  Patient is afebrile, no identifiable infectious etiology noted at this time.  We will discontinue antibiotics and watch for any signs and symptoms of infection.  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  Plan  -Blood cultures pending     #Congestive diastolic heart failure ejection fraction 50 to 55%  #Bilateral lower extremity edema secondary to above  #Atrial fibrillation  #Hypertension  #Hyperlipidemia  -11/14 echo shows EF 45-50%, moderate to severely elevated right ventricular systolic pressure  -Patient had a echocardiogram done from [facility] however records are not available within the computer.  I was able to review the records provided by the family members which showed ejection fraction 50 to  55% with dilated left and right atria and mild aortic valve stenosis.  Plan  -Continue home 125 mcg digoxin, 120 Mg diltiazem, 25 mg losartan and 10 mg atorvastatin.  -IV Lasix 40 mg twice daily (home dose 80 mg daily, missed doses past few days)  -Cardiology consult pending  -Podiatry consult pending for foot care/mild ulcerations causing pain (Voltaren gel ordered)     #COPD, 3 L at baseline  #Pulmonary hypertension  #History of DVT/PE   -At baseline patient is on 3 L nasal cannula  -Patient has recent diagnosis of pulmonary hypertension however did not have any formal work-up.  She does follow-up with a pulmonologist and was recommended to see a cardiologist however patient had not had the time to make appointment.  Plan  -Continue patient on 2 L home oxygen  -Supportive therapy; Incentive spirometry, home Spiriva inhaler, and,  DuoNebs as needed  -Continue Eliquis        #Hypomagnesemia  -Replete as necessary >2       #Depression  #GERD  #Incontinence  -Continue home sertraline 50 mg daily  -Continue home omeprazole 40 Mg  -Continue home oxybutynin 10mg every day        DVT prophylaxis: Home Eliquis  Diet: Easy to chew diet  Consults: Cardiology, neurology, PT/OT ([facility] 10/11), social work  Code: DNR/DNI  Dispo: Telemetry       Shadia Carey,   PGY-1, Internal Medicine  Please SecureChat for any further questions  This is a preliminary note, please await attending attestation for final A/P

## 2023-11-14 NOTE — PROGRESS NOTES
This TCC met with patient and daughter, Ly, at bedside, introduced self and explained role.  Demographic information and insurance verified.  Patient is from home alone.  Independent w/walker, doesn't drive.  Sleeps in family room as bedrooms are on 2nd floor and patient doesn't ambulate stairs.  Daughter reports 3 falls since August, increasing confusion, sundowning.  Denies SW needs at this time.  Patient plans to return home at discharge.  Disposition pending hospital course; PT/OT evals pending.  Discussed possible need for skilled care atr discharge; SNF list given to daughter.  TCC will continue to follow care progression for discharge planning needs.     11/14/23 1123   Discharge Planning   Living Arrangements Alone   Support Systems Children   Assistance Needed Independent, uses 2-wheeled walker.  Daughter reports 3 falls since August. Daughter does laundry and provides transportation needs.   Type of Residence Private residence   Number of Stairs to Enter Residence 3   Number of Stairs Within Residence   (Keeps to first floor, has ramp to family room.)   Do you have animals or pets at home? No   Who is requesting discharge planning? Provider   Home or Post Acute Services Post acute facilities (Rehab/SNF/etc);Other (Comment)  (On home O2 at 3 LPM)   Type of Post Acute Facility Services Rehab   Patient expects to be discharged to: Home   Financial Resource Strain   How hard is it for you to pay for the very basics like food, housing, medical care, and heating? Not hard   Housing Stability   In the last 12 months, was there a time when you were not able to pay the mortgage or rent on time? N   In the last 12 months, how many places have you lived? 1   In the last 12 months, was there a time when you did not have a steady place to sleep or slept in a shelter (including now)? N   Transportation Needs   In the past 12 months, has lack of transportation kept you from medical appointments or from getting  medications? no   In the past 12 months, has lack of transportation kept you from meetings, work, or from getting things needed for daily living? No     Nannette Elmore, [facility] TCC

## 2023-11-15 NOTE — CARE PLAN
The patient's goals for the shift include  improvement in breathing     The clinical goals for the shift include  decrease in need for 02

## 2023-11-15 NOTE — PROGRESS NOTES
"Zayda Grace is a 83 y.o. female on day 0 of admission presenting with Altered mental status.      Subjective   MRI of the brain with and without contrast is negative for any acute findings in relation to CVA, hemorrhage, or lesion.  Reversible this for dementia labs are currently unremarkable, with vitamin B1 and TSH still pending.  MoCA assessment also pending.       Objective     Last Recorded Vitals  Blood pressure 120/58, pulse 79, temperature 37 °C (98.6 °F), temperature source Temporal, resp. rate 20, height 1.549 m (5' 1\"), weight 72.6 kg (160 lb), SpO2 96 %.  Physical exam/neurological exam  Patient seen and examined at this time; upon entering room she is resting quietly in bed. Appears fully developed and well nourished.   Mental status: A&Ox 2.5. Memory testing, fund of knowledge and concentration very minorly impaired. Speech is fluent and negative for any paraphrasic errors.     Cranial Nerves:  Optic II/ Oculomotor III: Fundoscopic exam was technically difficult. PERRL +2. Visual fields are full. Convergence and accomodation noted without difficulty. Negative for deficits to visual acuity confrontation via static-finger wiggle test. Eyes appear aligned and free of exophthalmos and ptosis. Sclera are white bilaterally and lens are free from clouding.   Oculomotor III/ Trochlear IV/ Abducens VI: Extraocular movements are full, with no evidence of nystagmus. Negative for diplopia.   Trigeminal V: Facial sensation is intact to light touch. Corneal reflex responsive when threatened bilaterally.  Facial VII: intact; nose is midline, with no evidence of flattening to nasolabial folds noted and mouth is negative for evidence of droop. Patient is successfully able to follow commands to raise eyebrows, squeeze eyes shut, smile and show teeth, frown, and puff out cheeks.   Acoustic VIII: Hearing is intact bilaterally.  Glossopharngyeal IX/ Vagus X: Palate elevates symmetrically to phonation. Findings are negative " for uvula deviation or dysphagia.   Spinal accessory XI: Sternocleidomastoid/ upper trapezius is 5/5 to strength testing. No asymmetry noted to strength, bulk, or tone.   Hypoglossal XII: Tongue is midline and without deviations. Phonation is articulate and is negative for findings of dysarthria or aphasia.     Motor exam: negative for evidence of involuntary movements or fasiculations. BUE flexion of biceps and brachioradialis graded 5/5, in addition to extension of triceps at elbow and wrists. BUE  strength 5/5, along with finger abduction and thumb opposition. BLE hip flexion, extension, adduction, and abduction 4+/5. Knee extension & flexion 5/5. Ankle dorsiflexion and plantarflexion 5/5. Normal bulk and normal tone.     Sensory exam: Sensation is intact to light touch throughout.    Reflexes: Reflexes are 0 and symmetric. Bilateral plantar responses are flexor. Ankle jerks symmetric.     Coordination: finger-to-nose testing is negative for signs of dysmetria. Pronator drift testing to BUE negative. Rapid alternating hand movements WNL.    Gait exam: Deferred as patient is a high fall risk    Relevant Results  Scheduled medications  apixaban, 5 mg, oral, BID  atorvastatin, 10 mg, oral, Nightly  cefTRIAXone, 1 g, intravenous, q24h  digoxin, 125 mcg, oral, Daily  dilTIAZem ER, 120 mg, oral, Daily  doxycycline, 100 mg, intravenous, q12h  tiotropium, 2 Inhalation, inhalation, Daily   And  formoterol, 20 mcg, nebulization, q12h  furosemide, 40 mg, oral, BID with meals  losartan, 12.5 mg, oral, Daily  methylPREDNISolone sodium succinate (PF), 20 mg, intravenous, q12h  [Held by provider] oxybutynin, 10 mg, oral, Nightly  pantoprazole, 40 mg, oral, Daily before breakfast  perflutren lipid microspheres, 0.5-10 mL of dilution, intravenous, Once in imaging  perflutren protein A microsphere, 0.5 mL, intravenous, Once in imaging  sertraline, 50 mg, oral, Daily  sodium chloride, 3 mL, nebulization, q6h [facility]  sulfur  hexafluoride microsphr, 2 mL, intravenous, Once in imaging      Continuous medications     PRN medications  PRN medications: acetaminophen, albuterol, diclofenac sodium, ipratropium-albuteroL, polyethylene glycol  Results for orders placed or performed during the hospital encounter of 11/13/23 (from the past 24 hour(s))   Vitamin B12   Result Value Ref Range    Vitamin B12 1,339 (H) 211 - 911 pg/mL   TSH   Result Value Ref Range    Thyroid Stimulating Hormone 1.58 0.44 - 3.98 mIU/L   Folate   Result Value Ref Range    Folate, Serum 7.9 >5.0 ng/mL   Magnesium   Result Value Ref Range    Magnesium 1.70 1.60 - 2.40 mg/dL   Basic Metabolic Panel   Result Value Ref Range    Glucose 79 74 - 99 mg/dL    Sodium 140 136 - 145 mmol/L    Potassium 3.5 3.5 - 5.3 mmol/L    Chloride 98 98 - 107 mmol/L    Bicarbonate 38 (H) 21 - 32 mmol/L    Anion Gap 8 (L) 10 - 20 mmol/L    Urea Nitrogen 9 6 - 23 mg/dL    Creatinine 0.62 0.50 - 1.05 mg/dL    eGFR 87 >60 mL/min/1.73m*2    Calcium 8.4 (L) 8.6 - 10.3 mg/dL   CBC   Result Value Ref Range    WBC 3.1 (L) 4.4 - 11.3 x10*3/uL    nRBC 0.0 0.0 - 0.0 /100 WBCs    RBC 3.23 (L) 4.00 - 5.20 x10*6/uL    Hemoglobin 9.7 (L) 12.0 - 16.0 g/dL    Hematocrit 31.9 (L) 36.0 - 46.0 %    MCV 99 80 - 100 fL    MCH 30.0 26.0 - 34.0 pg    MCHC 30.4 (L) 32.0 - 36.0 g/dL    RDW 12.7 11.5 - 14.5 %    Platelets 131 (L) 150 - 450 x10*3/uL     XR chest 1 view    Result Date: 11/15/2023  STUDY: Chest Radiograph;  11/15/2023 11:23 AM. INDICATION: Hypoxia. COMPARISON: Chest, single portable view obtained on 11/13/2023 at 11:35 hours (two images obtained). ACCESSION NUMBER(S): [id number] ORDERING CLINICIAN: TACOS MERUVA TECHNIQUE:  Frontal chest was obtained at 11:11 hours. FINDINGS: CARDIOMEDIASTINAL SILHOUETTE: The heart is enlarged.  LUNGS: Multifocal patchy regions of airspace opacity both lungs.  Small pleural effusions.  Patient rotation towards the left.  ABDOMEN: No remarkable upper abdominal findings.   BONES: Scoliosis thoracic spine.    Multifocal patchy pulmonary infiltrates. Small pleural effusions. Cardiomegaly. Signed by Clive Rudolph MD    MR brain wo IV contrast    Result Date: 11/14/2023  Interpreted By:  Joseph Mays, STUDY: MR BRAIN WO IV CONTRAST;  11/14/2023 6:57 pm   INDICATION: Signs/Symptoms:Progressive decline over the past month, recurrent falls.   COMPARISON: Head CT, 11/13/2023 and brain MRI, 07/08/2016   ACCESSION NUMBER(S): [id number]   ORDERING CLINICIAN: DELMY HUNT   TECHNIQUE: Axial T2, FLAIR, DWI, gradient echo T2 and sagittal and coronal T1 weighted images of the brain were acquired.  Image quality is degraded by motion.   FINDINGS: CSF Spaces: The ventricles, sulci and basal cisterns are appropriate for the degree of volume loss. No abnormal extra-axial collection.   Parenchyma: There is no restricted diffusion to suggest acute infarction.  Parenchymal volume loss most pronounced in the parietal lobes, unchanged from the most recent comparison but mildly progressed from 2016. Nonspecific T2 hyperintense signal in the white matter is likely secondary to chronic small vessel ischemic disease. No evidence of intracranial hemorrhage. There is no mass effect or midline shift.   Paranasal Sinuses and Mastoids: Scattered paranasal sinus mucosal thickening. The mastoid air cells are clear.       Mildly motion limited exam, no acute intracranial pathology.   MACRO: None   Signed by: Joseph Mays 11/14/2023 7:15 PM Dictation workstation:   [id number]    Transthoracic Echo (TTE) Complete    Result Date: 11/14/2023    [facility], [address] [place] [id number] [phone] and                                 Fax [phone] TRANSTHORACIC ECHOCARDIOGRAM REPORT  Patient Name:      SHANTHI GAUDIO    Reading Physician:    [id number] Killian Carolina MD Study Date:        11/14/2023           Ordering Provider:     [id number] TERI MCLAIN MRN/PID:           [id number]             Fellow: Accession#:        [id number]         Nurse: Date of Birth/Age: 1940 / 83 years Sonographer:          Sinan Benavides CS Gender:            F                    Additional Staff: Height:            154.94 cm            Admit Date:           11/13/2023 Weight:            72.58 kg             Admission Status:     Emergency BSA:               1.72 m2              Encounter#:           [id number]                                         Department Location:  [facility] Blood Pressure: 129 /63 mmHg Study Type:    TRANSTHORACIC ECHO (TTE) COMPLETE Diagnosis/ICD: Chronic systolic (congestive) heart failure (CHF)-I50.22 CPT Code:      Echo Complete w Full Doppler-61307; Myocardial Strain                Imaging-18707 Patient History: Pertinent History: A-Fib, Cardiomyopathy, CHF, Previous DVT, Hyperlipidemia,                    COPD and Dyspnea. bacteremia. Study Detail: The following Echo studies were performed: 2D, M-Mode, Doppler,               color flow and Strain. Technically challenging study due to body               habitus and the patient's lack of cooperation.  PHYSICIAN INTERPRETATION: Left Ventricle: The left ventricular systolic function is mildly decreased, with an estimated ejection fraction of 45-50%. There is global hypokinesis of the left ventricle with minor regional variations. The left ventricular cavity size is normal. Left Ventricular Global Longitudinal Strain - 15.9 %. Spectral Doppler shows an impaired relaxation pattern of left ventricular diastolic filling. Left Atrium: The left atrium is severely dilated. Right Ventricle: The right ventricle is mildly enlarged. There is mildly reduced right ventricular systolic function. Right Atrium: The right atrium is mildly dilated. Aortic Valve: The aortic valve is probably trileaflet. There is evidence of mild  aortic valve stenosis. There is no evidence of aortic valve regurgitation. The peak instantaneous gradient of the aortic valve is 19.2 mmHg. The mean gradient of the aortic valve is 10.0 mmHg. Mitral Valve: The mitral valve is mildly thickened. There is mild to moderate mitral valve regurgitation. Tricuspid Valve: The tricuspid valve is abnormal. There is moderate tricuspid regurgitation. The Doppler estimated RVSP is moderate to severely elevated at 45.0 mmHg. Pulmonic Valve: The pulmonic valve is structurally normal. There is trace pulmonic valve regurgitation. Pericardium: There is no pericardial effusion noted. Aorta: The aortic root is normal. There is mild dilatation of the ascending aorta. There is mild dilatation of the aortic root. Systemic Veins: The inferior vena cava appears dilated.  CONCLUSIONS:  1. Left ventricular systolic function is mildly decreased with a 45-50% estimated ejection fraction.  2. Spectral Doppler shows an impaired relaxation pattern of left ventricular diastolic filling.  3. There is mildly reduced right ventricular systolic function.  4. The left atrium is severely dilated.  5. Mild to moderate mitral valve regurgitation.  6. The tricuspid valve is abnormal.  7. Moderate to severely elevated right ventricular systolic pressure.  8. Moderate tricuspid regurgitation visualized.  9. Mild aortic valve stenosis. 10. There is global hypokinesis of the left ventricle with minor regional variations. QUANTITATIVE DATA SUMMARY: 2D MEASUREMENTS:                          Normal Ranges: LAs:           5.00 cm   (2.7-4.0cm) IVSd:          1.01 cm   (0.6-1.1cm) LVPWd:         0.95 cm   (0.6-1.1cm) LVIDd:         4.63 cm   (3.9-5.9cm) LVIDs:         3.47 cm LV Mass Index: 90.9 g/m2 LV % FS        25.1 % LA VOLUME:                             Normal Ranges: LA Volume Index: 62.0 ml/m2 RA VOLUME BY A/L METHOD:                       Normal Ranges: RA Area A4C: 28.0 cm2 M-MODE MEASUREMENTS:                   Normal Ranges: Ao Root: 3.80 cm (2.0-3.7cm) LV SYSTOLIC FUNCTION BY 2D PLANIMETRY (MOD):                                          Normal Ranges: EF-A4C View:                      52.4 % (>=55%) EF-A2C View:                      48.3 % EF-Biplane:                       50.7 % Global Longitudinal Strain (GLS): 15.9 % LV DIASTOLIC FUNCTION:                     Normal Ranges: MV Peak E: 0.99 m/s (0.7-1.2 m/s) MV Peak A: 0.37 m/s (0.42-0.7 m/s) E/A Ratio: 2.69     (1.0-2.2) MITRAL VALVE:                 Normal Ranges: MV DT: 185 msec (150-240msec) AORTIC VALVE:                                    Normal Ranges: AoV Vmax:                2.19 m/s  (<=1.7m/s) AoV Peak P.2 mmHg (<20mmHg) AoV Mean PG:             10.0 mmHg (1.7-11.5mmHg) LVOT Max Linwood:            1.11 m/s  (<=1.1m/s) AoV VTI:                 43.10 cm  (18-25cm) LVOT VTI:                21.50 cm LVOT Diameter:           2.10 cm   (1.8-2.4cm) AoV Area, VTI:           1.73 cm2  (2.5-5.5cm2) AoV Area,Vmax:           1.76 cm2  (2.5-4.5cm2) AoV Dimensionless Index: 0.50  RIGHT VENTRICLE: RV Basal 4.66 cm RV Mid   3.04 cm RV Major 8.5 cm TAPSE:   13.4 mm TRICUSPID VALVE/RVSP:                             Normal Ranges: Peak TR Velocity: 3.24 m/s RV Syst Pressure: 45.0 mmHg (< 30mmHg) PULMONIC VALVE:                      Normal Ranges: PV Max Linwood: 1.3 m/s  (0.6-0.9m/s) PV Max P.3 mmHg  [id number] Killian Carolina MD Electronically signed on 2023 at 1:57:36 PM  ** Final **     US renal complete    Result Date: 2023  INDICATION:  Hydronephrosis. TECHNIQUE:  Ultrasound of the kidneys and bladder. COMPARISON:  None available FINDINGS: The right kidney measures 9.8 cm in length.  Renal cortical thinning is present.  There is no hydronephrosis.  There is a single stone located within the lower pole measuring 0.5 cm.  There are multiple renal cysts present with the largest located within the midportion and measuring 1.2 x 1.1 x 1.3 cm. The  left kidney measures 8.2 cm in length.  Mild renal cortical thinning is present.  There is no hydronephrosis.  There are no stones.  There is a simple cyst within the midportion measuring 1.0 x 0.9 x 1.0 cm. The bladder is partially distended.  The bilateral ureteral jets are not visualized.     Nonobstructing right calculus measuring 0.5 cm. Bilateral renal cortical thinning.  No hydronephrosis bilaterally. Bilateral simple cysts. Signed by Hiro Johnson MD    CT head wo IV contrast    Result Date: 11/13/2023  Interpreted By:  Tommy Benties, STUDY: CT HEAD WO IV CONTRAST;  11/13/2023 1:00 pm   INDICATION: confusion.   COMPARISON: None.   ACCESSION NUMBER(S): [id number]   ORDERING CLINICIAN: TERI MCLAIN   TECHNIQUE: Noncontrast axial CT scan of head was performed. Angled reformats in brain and bone windows were generated. The images were reviewed in bone, brain, blood and soft tissue windows.   FINDINGS: CSF Spaces: The ventricles, sulci and basal cisterns are within normal limits. There is no extraaxial fluid collection.   Parenchyma: Significant supratentorial atrophy. The grey-white differentiation is intact. There is no mass effect or midline shift. There is no intracranial hemorrhage.   Calvarium: No acute displaced calvarial fracture.   Paranasal sinuses and mastoids: Visualized paranasal sinuses and mastoids are clear.       No CT evidence of acute intracranial pathology.       MACRO: None       Signed by: Tommy Benites 11/13/2023 1:44 PM Dictation workstation:   [id number]    XR chest 1 view    Result Date: 11/13/2023  Interpreted By:  Tommy Benites, STUDY: XR CHEST 1 VIEW;  11/13/2023 11:43 am   INDICATION: Signs/Symptoms:sob.   COMPARISON: Chest radiograph 11/21/2017   ACCESSION NUMBER(S): [id number]   ORDERING CLINICIAN: TERI MCLAIN   FINDINGS:     CARDIOMEDIASTINAL SILHOUETTE: Cardiomediastinal silhouette is stable in size and configuration.   LUNGS: No consolidation, pneumothorax, or  significant effusion. Mild bilateral diffuse reticular changes, favored to be chronic   ABDOMEN: No remarkable upper abdominal findings.   BONES: No acute osseous changes.       1.  No evidence of acute cardiopulmonary process.     Signed by: Tommy Benites 11/13/2023 12:00 PM Dictation workstation:   [id number]                       [place] Coma Scale  Best Eye Response: Spontaneous  Best Verbal Response: Confused  Best Motor Response: Follows commands  [place] Coma Scale Score: 14                             Assessment/Plan      Principal Problem:    Altered mental status  Active Problems:    Shortness of breath    -Patient to continue previously prescribed Eliquis 5 mg p.o. twice daily and atorvastatin 10 mg p.o. daily for CVA prophylaxis.  -MoCA assessment pending completion via OT, in addition to recommendations for needs during hospitalization and at discharge via PT OT.   -Continue promotion of lifestyle modifications, such as: Strict BP and glycemic control, dietary habit changes, incorporation of daily exercise regimen, adherence to all prescription/OTC medication schedules, attendance to all follow-up appointments, cessation from smoking if applicable, abstinence from alcohol and illicit drug use if applicable  -Patient to follow-up with PCP in 1 to 2 weeks postdischarge  -Patient to follow-up with Dr. Korin Spencer in 2 to 3 months postdischarge    Total critical care face-to-face time spent with patient was 30 minutes; more than 50% of my time was spent counseling the patient on: preparation to see patient via chart review of resulted laboratory values, radiographic imaging, prescribed medications, and impairment of involved organ systems. Time also spent on medical examination, placing appropriate orders for testing/medications, communicating with other health care providers, counseling/educating the patient/family, and care coordination.    *This note was created using voice recognition transcription  software. Despite proofreading, unintentional typographical errors may be present. Please contact the department of neurology with any questions or concerns.         ELVIA Mensah-CNP

## 2023-11-15 NOTE — PROGRESS NOTES
"SUBJECTIVE  Patient does not feel short of breath and says she only has a baseline cough present.  However upon examination she sounds fairly congested and wet.    OBJECTIVE    Physical Exam:  General:  Pleasant and cooperative. No apparent distress.  HEENT:  Normocephalic, atraumatic  Chest:  Clear to auscultation bilaterally.  Bilateral upper and lower rales  CV:  Regular rate and rhythm. No murmurs    Abdomen: Abdomen is soft, non-tender, non-distended. BS +   Extremities:  No lower extremity edema or cyanosis.   Neurological: No focal deficits.  Skin:  Warm and dry.      Last Recorded Vitals  Blood pressure 128/56, pulse 76, temperature 37 °C (98.6 °F), temperature source Temporal, resp. rate 20, height 1.549 m (5' 1\"), weight 72.6 kg (160 lb), SpO2 97 %.  Intake/Output last 3 Shifts:  No intake/output data recorded.    Last CBC & BMP  Lab Results   Component Value Date    GLUCOSE 79 11/15/2023    CALCIUM 8.4 (L) 11/15/2023     11/15/2023    K 3.5 11/15/2023    CO2 38 (H) 11/15/2023    CL 98 11/15/2023    BUN 9 11/15/2023    CREATININE 0.62 11/15/2023     Lab Results   Component Value Date    WBC 3.1 (L) 11/15/2023    HGB 9.7 (L) 11/15/2023    HCT 31.9 (L) 11/15/2023    MCV 99 11/15/2023     (L) 11/15/2023       ASSESSMENT & PLAN  Zayda Grace is a 83 y.o. female presenting with past medical history significant for prior PE was (2016), DVT (2016), COPD on 3 L home oxygen, presents to the emergency department confusion.  Daughter is at bedside supplementing history.  Family mentioned she has been progressively more confused and having multiple falls over the past month. She normally sundown's after 3 PM however her mentation has changed from baseline.  Her last fall was on Friday evening and she reported not hitting her head or having a loss of consciousness.     Daily Progress:  11/14: PT/OT [facility] 10/11.  Neuro recommends MRI brain without contrast, checking B12, folate, B1, TSH.  MoCA test pending.  " Cardiology consult pending. Podiatry consult pending for foot care/mild ulcerations causing pain (Voltaren gel ordered). Long-term placement pending.  11/15: MRI and neuro labs negative.  Starting steroids, IV Lasix to oral Lasix twice daily 40 Mg, doxycycline in D5W, ordered CXR.    #Recurrent falls  #Progressive mental/cognitive decline over the past month  #Acute metabolic encephalopathy  #Rule out bilateral pneumonia  -Per patient's daughters, noted that she been having recurrent falls, most recent falls on Friday did not sustain any injury to the head.  -Family noted that patient is progressively getting confused usually around 3 PM started to sundown.  Does live alone and take care of medications  -MRI, B12, folate, TSH normal  Plan  -Methylprednisolone 20 Mg IV daily, ceftriaxone/doxycycline, cultures/urine antigens pending  -Avoid hospital associated delirium.  Maintain normal sleep-wake cycle  -Social work consult for placement to assisted living facility  -B1, MoCA test pending        #UTI, ruled out  -In ED, received 1 g IV ceftriaxone empirically for concerning UTI presentation  -UA negative for any signs of UTI.  Patient is afebrile, no identifiable infectious etiology noted at this time.  We will discontinue antibiotics and watch for any signs and symptoms of infection.  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  Plan  -Blood cultures pending     #Congestive diastolic heart failure ejection fraction 50 to 55%  #Bilateral lower extremity edema secondary to above  #Atrial fibrillation  #Hypertension  #Hyperlipidemia  -11/14 echo shows EF 45-50%, moderate to severely elevated right ventricular systolic pressure  -Patient had a echocardiogram done from [facility] however records are not  available within the computer.  I was able to review the records provided by the family members which showed ejection fraction 50 to 55% with dilated left and right atria and mild aortic valve stenosis.  Plan  -Continue home 125 mcg digoxin, 120 Mg diltiazem, 25 mg losartan and 10 mg atorvastatin.  -Transition to oral Lasix 40 Mg twice daily  -Cardiology consult pending  -Podiatry consult pending for foot care/mild ulcerations causing pain (Voltaren gel ordered)     #COPD, 3 L at baseline  #Pulmonary hypertension  #History of DVT/PE   -At baseline patient is on 3 L nasal cannula  -Patient has recent diagnosis of pulmonary hypertension however did not have any formal work-up.  She does follow-up with a pulmonologist and was recommended to see a cardiologist however patient had not had the time to make appointment.  Plan  -Continue patient on 2 L home oxygen  -Supportive therapy; Incentive spirometry, home Spiriva inhaler, and,  DuoNebs as needed  -Continue Eliquis        #Hypomagnesemia  -Replete as necessary >2       #Depression  #GERD  #Incontinence  -Continue home sertraline 50 mg daily  -Continue home omeprazole 40 Mg  -Continue home oxybutynin 10mg every day        DVT prophylaxis: Home Eliquis  Diet: Easy to chew diet  Consults: Cardiology, neurology, PT/OT ([facility] 10/11), social work  Code: DNR/DNI  Dispo: Telemetry       Shadia Carey,   PGY-1, Internal Medicine  Please SecureChat for any further questions  This is a preliminary note, please await attending attestation for final A/P

## 2023-11-15 NOTE — ED NOTES
Waited 30 minutes for respiratory to come do neb treatment; did not come down; I gave neb treatment per order     Emily Dyer RN  11/15/23 0642

## 2023-11-15 NOTE — ED NOTES
Went in room to draw Am labs and patient had pulled out IV; new HL placed and labs drawn and sent; son still at bedside     Emily Dyer RN  11/15/23 [id number]

## 2023-11-15 NOTE — ED NOTES
Patient wheezing in room and slightly tachypneic; got order for neb treatment and paged respiratory     Emily Dyer RN  11/15/23 9781

## 2023-11-15 NOTE — ED NOTES
Assume care of patient at 0710  Pt provided with breakfast pt re positioned. Pt suction changed,. Pt no longer  having delusions of baby or labor [ains. Pt is A&Ox3 but does not remember why she is here pt reoriented.      Sarita Piedra RN  11/15/23 1012

## 2023-11-16 NOTE — PROGRESS NOTES
Physical Therapy                 Therapy Communication Note    Patient Name: Zayda Grace  MRN: [id number]  Today's Date: 11/16/2023     Discipline: Physical Therapy    Missed Visit Reason: Missed Visit Reason:  (Pt with respiratory therapy at this time.)    Missed Time: Attempt    Comment:

## 2023-11-16 NOTE — PROGRESS NOTES
11/16/23 1156   Discharge Planning   Type of Post Acute Facility Services Skilled nursing   Patient expects to be discharged to: SNF   Does the patient need discharge transport arranged? Yes   RoundTrip coordination needed? Yes     1150:  Notified by SW SNF preferences are 1. [facility] 2. [facility] 3. [facility]. [facility] 5. [facility] 6. [facility].  Referrals will be submitted to the above facilities for review.    1444:  Received responses from 1. [facility] can accept 2. [facility] can accept 3. [facility] not able to accept 4. [facility] can accept 5. [facility] not able to accept 6. [facility] can accept.  SW to follow up with daughter on final FOC.

## 2023-11-16 NOTE — CONSULTS
Cardiology Consult Note    Inpatient consult to Cardiology  Consult performed by: Kailey Schmitt MD  Consult ordered by: Jourdan Ly MD         Zayda Grace is a 83 y.o. female on day 0 of admission presenting with Altered mental status.      Subjective   This is an 83-year-old female who was admitted with mental status changes.  She has underlying COPD and was found to have possibly pneumonia and or bronchitis.  She did not take her diuretics at home and came in somewhat volume overloaded.  BNP was approximately 180.  LV function is mildly impaired an EF of 45 to 50% with evidence of moderate pulmonary hypertension and aortic stenosis mitral and tricuspid regurgitation.  The patient chronically uses 2 L of oxygen at home.  She has been receiving IV Lasix here in the hospital and is diuresed well.  She is feeling well at this time.  She denies chest pain or pressure.  She still has what she considers a chronic cough.  She denies chest pain or pressure.       ROS:  10 systems reviewed other than what is mentioned above.     Past Medical History:    1.  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  DVT with pulmonary embolism in 2016.  3.  Atrial fibrillation  4.  History of diastolic heart failure  5.  Some cognitive impairment  6.  Back surgery  7.  Knee surgery  Past Medical History:   Diagnosis Date    Acute embolism and thrombosis of other specified deep vein of lower extremity, bilateral (CMS/HCC) 12/16/2016    Deep vein thrombosis (DVT) of other vein of both lower extremities    Personal history of pulmonary embolism 12/16/2016    History of pulmonary embolism       Problem List Items Addressed This Visit       Systolic heart failure (CMS/HCC)    Relevant Medications    dilTIAZem ER (Tiazac) 120 mg 24 hr capsule    digoxin (Lanoxin) 125 MCG tablet    digoxin (Lanoxin) tablet 125 mcg    dilTIAZem CD (Cardizem CD) 24 hr capsule 120 mg    Other Relevant Orders    Transthoracic Echo (TTE) Complete (Completed)    * (Principal) Altered  mental status - Primary     Other Visit Diagnoses       Lower urinary tract infectious disease        Chronic respiratory failure with hypoxia and hypercapnia (CMS/[facility])                Family History:  No relevant family history has been documented for this patient.    Medications:  Prior to Admission medications    Medication Sig Start Date End Date Taking? Authorizing Provider   albuterol 90 mcg/actuation inhaler Inhale 2 puffs every 4 hours if needed. 11/7/23 11/6/24 Yes Historical Provider, MD   apixaban (Eliquis) 5 mg tablet Take 1 tablet (5 mg) by mouth 2 times a day. 8/30/23 8/29/24 Yes Historical Provider, MD   atorvastatin (Lipitor) 10 mg tablet Take 1 tablet (10 mg) by mouth once daily at bedtime. 11/13/23  Yes Historical Provider, MD   digoxin (Lanoxin) 125 MCG tablet Take 1 tablet (125 mcg) by mouth once daily. 12/17/15  Yes Historical Provider, MD   dilTIAZem ER (Tiazac) 120 mg 24 hr capsule Take 1 capsule (120 mg) by mouth once daily. 1/20/15  Yes Historical Provider, MD   fluticasone (Flonase) 50 mcg/actuation nasal spray Administer 2 sprays into affected nostril(s) once daily as needed. 4/27/20  Yes Historical Provider, MD   furosemide (Lasix) 80 mg tablet Take 1 tablet (80 mg) by mouth once daily. 12/2/21  Yes Historical Provider, MD   ipratropium-albuteroL (Duo-Neb) 0.5-2.5 mg/3 mL nebulizer solution Take 3 mL by nebulization every 6 hours if needed. 11/8/23  Yes Historical Provider, MD   losartan (Cozaar) 25 mg tablet Take 0.5 tablets (12.5 mg) by mouth once daily. 6/9/16  Yes Historical Provider, MD   omeprazole (PriLOSEC) 40 mg DR capsule Take 1 capsule (40 mg) by mouth once daily. 12/1/22  Yes Historical Provider, MD   oxybutynin XL (Ditropan-XL) 10 mg 24 hr tablet Take 1 tablet (10 mg) by mouth once daily. 12/1/22  Yes Historical Provider, MD   potassium chloride CR 20 mEq ER tablet Take 2 tablets (40 mEq) by mouth twice a day. Crushes in pudding 5/31/23  Yes Historical Provider, MD    sertraline (Zoloft) 50 mg tablet Take 1 tablet (50 mg) by mouth once daily. 3/19/23  Yes Historical Provider, MD   tiotropium-olodateroL (Stiolto Respimat) 2.5-2.5 mcg/actuation mist inhaler Inhale 2 Inhalations once daily. 11/7/23 11/6/24 Yes Historical Provider, MD   risedronate (Actonel) 35 mg tablet Take 1 tablet (35 mg) by mouth 1 (one) time per week. On Sunday 8/5/21 11/13/23 Yes Historical Provider, MD   cholecalciferol (Vitamin D-3) 50 mcg (2,000 unit) capsule Take 1 tablet by mouth once daily.    Historical Provider, MD   cyanocobalamin (Vitamin B-12) 500 mcg tablet Take 1 tablet (500 mcg) by mouth once daily.    Historical Provider, MD   polyethylene glycol (Glycolax, Miralax) 17 gram packet Take 17 g by mouth once daily as needed.    Historical Provider, MD     Current Facility-Administered Medications   Medication Dose Route Frequency Provider Last Rate Last Admin    acetaminophen (Tylenol) tablet 650 mg  650 mg oral q6h PRN Shadia Jonesambati, DO   650 mg at 11/14/23 1251    albuterol 2.5 mg /3 mL (0.083 %) nebulizer solution 2.5 mg  2.5 mg nebulization TID Jourdan Ly MD   2.5 mg at 11/16/23 0655    albuterol 90 mcg/actuation inhaler 2 puff  2 puff inhalation q4h PRN Charlie Meruva, DO        apixaban (Eliquis) tablet 5 mg  5 mg oral BID Charlie Meruva, DO   5 mg at 11/15/23 2040    atorvastatin (Lipitor) tablet 10 mg  10 mg oral Nightly Charlie Meruva, DO   10 mg at 11/15/23 2040    cefTRIAXone (Rocephin) IVPB 1 g  1 g intravenous q24h Charlie Meruva, DO   Stopped at 11/15/23 1242    diclofenac sodium (Voltaren) 1 % gel 1 Application  4 g Topical 4x daily PRN Ali Robertambati, DO        digoxin (Lanoxin) tablet 125 mcg  125 mcg oral Daily Charlie Meruva, DO   125 mcg at 11/15/23 1753    dilTIAZem CD (Cardizem CD) 24 hr capsule 120 mg  120 mg oral Daily Charlie Weller DO   120 mg at 11/13/23 2137    doxycycline (Vibramycin) in dextrose 5 % in water (D5W) 100 mL  mg  100 mg intravenous q12h Charlie Weller,  DO   Stopped at 11/15/23 2141    tiotropium (Spiriva Respimat) 2.5 mcg/actuation inhaler 2 puff  2 Inhalation inhalation Daily [facility]        And    formoterol (Perforomist) 20 mcg/2 mL nebulizer solution 20 mcg  20 mcg nebulization q12h [facility]        furosemide (Lasix) tablet 40 mg  40 mg oral BID with meals [facility], DO   40 mg at 11/15/23 1753    losartan (Cozaar) tablet 12.5 mg  12.5 mg oral Daily [facility], [facility]   12.5 mg at 11/15/23 1346    methylPREDNISolone sod succinate (PF) (SOLU-Medrol) 40 mg/mL injection 20 mg  20 mg intravenous q12h [facility]   20 mg at 11/15/23 2040    [Held by provider] oxybutynin (Ditropan) tablet 10 mg  10 mg oral Nightly Ali [facility], DO   10 mg at 11/14/23 2340    pantoprazole (ProtoNix) EC tablet 40 mg  40 mg oral Daily before breakfast [facility], DO   40 mg at 11/16/23 0642    perflutren lipid microspheres (Definity) injection 0.5-10 mL of dilution  0.5-10 mL of dilution intravenous Once in imaging [facility], [facility]        perflutren protein A microsphere (Optison) injection 0.5 mL  0.5 mL intravenous Once in imaging [facility], DO        polyethylene glycol (Glycolax, Miralax) packet 17 g  17 g oral Daily PRN [facility], DO        sertraline (Zoloft) tablet 50 mg  50 mg oral Daily [facility], [facility]   50 mg at 11/15/23 1014    sodium chloride 3 % nebulizer solution 3 mL  3 mL nebulization TID Jourdan Ly MD   3 mL at 11/16/23 0702    sulfur hexafluoride microsphr (Lumason) injection 24.28 mg  2 mL intravenous Once in imaging [facility] Meruva, DO           Allergies:  Allergies   Allergen Reactions    Codeine Hives and Shortness of breath    Etodolac Swelling    Hydromorphone Nausea And Vomiting    Lisinopril Cough    Tramadol Other     Slurring words, unable to tolerate       Social History:  Social History     Tobacco Use    Smoking status: Never     Passive exposure: Never    Smokeless tobacco: Never   Substance Use Topics     Alcohol use: Not on file       Physical Exam:  Last Recorded Vitals  /57 (BP Location: Right arm, Patient Position: Lying)   Pulse 61   Temp 36.1 °C (97 °F) (Temporal)   Resp 18   Wt 72.6 kg (160 lb)   SpO2 92%   Intake/Output last 3 Shifts:    Intake/Output Summary (Last 24 hours) at 11/16/2023 0852  Last data filed at 11/15/2023 2349  Gross per 24 hour   Intake 200 ml   Output --   Net 200 ml       Admission Weight  Weight: 72.6 kg (160 lb) (11/13/23 1131)    Daily Weight  11/13/23 : 72.6 kg (160 lb)      Patient is alert and oriented x3.  HEENT is unremarkable mucous members are moist  Neck no JVP no bruits upstrokes are full no thyromegaly  Lungs coarse rhonchi with bibasilar crackles.  Diffuse expiratory wheezes are heard  Heart irregular rhythm.  1/6 to 2/6 crescendo decrescendo systolic murmur left lower sternal border to the right upper sternal border with a holosystolic murmur at the axilla  Abdomen is soft vessels are positive nontender nondistended no organomegaly no pulsatile masses  Extremities have no edema.  Distal pulses present palpable.  Neuro is grossly nonfocal  Skin has no rashes     Image Results  XR chest 1 view  Narrative: STUDY:  Chest Radiograph;  11/15/2023 11:23 AM.  INDICATION:  Hypoxia.  COMPARISON:  Chest, single portable view obtained on 11/13/2023 at 11:35 hours (two  images obtained).  ACCESSION NUMBER(S):  [id number]  ORDERING CLINICIAN:  TACOS BRADFORD  TECHNIQUE:  Frontal chest was obtained at 11:11 hours.  FINDINGS:  CARDIOMEDIASTINAL SILHOUETTE:  The heart is enlarged.     LUNGS:  Multifocal patchy regions of airspace opacity both lungs.  Small  pleural effusions.  Patient rotation towards the left.     ABDOMEN:  No remarkable upper abdominal findings.     BONES:  Scoliosis thoracic spine.  Impression: Multifocal patchy pulmonary infiltrates.  Small pleural effusions.  Cardiomegaly.  Signed by Clive Rudolph MD        Relevant Results:  Results for orders placed or  performed during the hospital encounter of 11/13/23 (from the past 24 hour(s))   SST TOP   Result Value Ref Range    Extra Tube Hold for add-ons.    Lavender Top   Result Value Ref Range    Extra Tube Hold for add-ons.    Magnesium   Result Value Ref Range    Magnesium 1.80 1.60 - 2.40 mg/dL   Basic Metabolic Panel   Result Value Ref Range    Glucose 123 (H) 74 - 99 mg/dL    Sodium 137 136 - 145 mmol/L    Potassium 3.8 3.5 - 5.3 mmol/L    Chloride 92 (L) 98 - 107 mmol/L    Bicarbonate 40 (HH) 21 - 32 mmol/L    Anion Gap 9 (L) 10 - 20 mmol/L    Urea Nitrogen 12 6 - 23 mg/dL    Creatinine 0.65 0.50 - 1.05 mg/dL    eGFR 86 >60 mL/min/1.73m*2    Calcium 8.7 8.6 - 10.3 mg/dL   CBC   Result Value Ref Range    WBC 4.3 (L) 4.4 - 11.3 x10*3/uL    nRBC 0.0 0.0 - 0.0 /100 WBCs    RBC 3.83 (L) 4.00 - 5.20 x10*6/uL    Hemoglobin 11.2 (L) 12.0 - 16.0 g/dL    Hematocrit 36.7 36.0 - 46.0 %    MCV 96 80 - 100 fL    MCH 29.2 26.0 - 34.0 pg    MCHC 30.5 (L) 32.0 - 36.0 g/dL    RDW 12.7 11.5 - 14.5 %    Platelets 145 (L) 150 - 450 x10*3/uL       Results from last 7 days   Lab Units 11/16/23  0721 11/15/23  0243 11/14/23  1720 11/14/23  0519 11/13/23  1220   PROTEIN TOTAL g/dL  --   --   --   --  6.3*   BILIRUBIN TOTAL mg/dL  --   --   --   --  0.6   ALK PHOS U/L  --   --   --   --  82   ALT U/L  --   --   --   --  10   AST U/L  --   --   --   --  17   GLUCOSE mg/dL 123*   < >  --    < > 84   TSH mIU/L  --   --  1.58  --   --    BNP pg/mL  --   --   --   --  185*    < > = values in this interval not displayed.       Assessment/Plan    Past Medical History:    1.  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  DVT with pulmonary embolism in 2016.  3.  Atrial fibrillation  4.  History of diastolic heart failure  5.  Some cognitive impairment  6.  Back surgery  7.  Knee surgery    11/16/2023    1.  Possible mild diastolic heart failure.  BNP only modestly elevated.  She did not have her diuretics at home for couple days which is likely the cause of volume  retention.  Agree with transitioning IV diuretics to oral diuretics if she appears to be reasonably euvolemic.  She continues to have crackles with expiratory wheezes in her lungs consistent with her underlying COPD.  Continue with Lasix, losartan.  Consider the addition of an SGLT2 inhibitor.    2.  Atrial fibrillation.  Duration unclear.  She is on Eliquis for this and a previous DVT PE in 2016.  Rates are controlled with diltiazem and digoxin.    3.  Hyperlipidemia she is on statins    Thank you for this consult.  I will follow along as needed      Kailey Schmitt MD

## 2023-11-16 NOTE — NURSING NOTE
HF Clinical Nurse Navigator Documentation     Congestive Heart Failure disease education was attempted by the Clinical Nurse Navigator.  Pt denies she has CHF. Pt states she lives at home alone. All one-floor living. 5L of oxygen 24/7 due to emphysema. Pt states no change in her weight over the last couple months. Pt [place], lost both hearing aids and hasn't replaced them.

## 2023-11-16 NOTE — PROGRESS NOTES
Occupational Therapy    Occupational Therapy Treatment    Name: Zayda Grace  MRN: [id number]  : 1940  Date: 23  Time Calculation  Start Time: 1140  Stop Time: 1200  Time Calculation (min): 20 min    Assessment:  End of Session Communication: Bedside nurse  MoCA score sheet placed in patient's chart.       Subjective   Previous Visit Info:  OT Last Visit  OT Received On: 23        Objective     Outcome Measures:  MoCA  Visuospatial/Executive: 1  Namin  Attention: Read List of Letters: 1  Attention: Serial Sevens: 0  Language: Repeat: 0  Language: Fluency: 0  Abstraction: 0  Delayed Recall: 0 (MIS 4/15)  Orientation: 4  Add 1 Point if </=12 yr Education: 1  Patient scored 9/30. Will need 24 hour support secondary to cognitive deficits.      Goals:  Encounter Problems       Encounter Problems (Active)       OT Goals       Patient with complete upper and lower body bathing/dressing and toileting with minimal assist using adaptive equipment as needed  (Progressing)       Start:  23    Expected End:  23            Patient will perform bed mobility and functional transfers safely and minimal assist: bed, chair, commode using DME as needed  (Progressing)       Start:  23    Expected End:  23            Patient will tolerate standing for 8 mins. and show good (-) sitting and standing balance during ADL's and functional transfers/mobility  (Progressing)       Start:  23    Expected End:  23            apply energy conservation/diaphragmatic breathing techniques to ADL's and functional transfers with minimal cues  (Progressing)       Start:  23    Expected End:  23

## 2023-11-16 NOTE — PROGRESS NOTES
POONAM referred to see pt by TCC. POONAM met with pt, pt lanre and daughter Lainey at bedside. Lainey asked to speak privately to social work. Lainey states that she does not believe that her mom should be at home alone. Lainey's brother Gregoria is pt's healthcare POA and Lainey while not officially financial POA has been acting in this capacity for years. Lainey states that brother is starting to see that pt is not safe at home but wants to honor his mother's wishes to remain in her home. Pt has 6 children that are all involved and all live near. SW discussed placement in an AL versus private duty in home care. Pt has finances- approximately $72,000 in savings, owns home and gets $5,000 monthly in pension and social security benefits. Per daughter, pt does not like anyone in her home and does not believe that she needs anyone helping her in the home.     OT to complete MOCA on pt. POONAM did discuss with Lainey if family had discussed possibility of guardianship. Lainey states that if it came to it, she would hire a  and pursue guardianship if needed. Family wants pt to discharge to SNF and then will talk and work on plan for after SNF. SNF choices given to POONAM and POONAM sent to PCN. Will follow.    ADDED 1326 OT completed MOCA and pt score was 9. POONAM called and made daughter Lainey aware. POONAM sent message to medical team about possibility of writing a statement of expert evaluation. Waiting to hear back.    ADDED 1413: Hear back from medical team that Dr. Rose will write Statement of Expert Evaluation. POONAM emailed form to him and updated dtr Lainey.

## 2023-11-16 NOTE — PROGRESS NOTES
"SUBJECTIVE  She reports having only 1 bowel movement this past week.  She denies occultly with urination, fevers, chills. Reports no bilateral pain/tingling in feet. she reports good oral intake and hydrating regularly.  She states she would like to leave the hospital.    OBJECTIVE    Physical Exam:  General:  Pleasant and cooperative. No apparent distress.  HEENT:  Normocephalic, atraumatic  Chest:  Clear to auscultation bilaterally.  Bilateral upper and lower rales  CV:  Regular rate and rhythm. No murmurs    Abdomen: Abdomen is soft, non-tender, non-distended. BS +   Extremities:  No lower extremity edema or cyanosis.   Neurological: No focal deficits.  Skin:  Warm and dry.      Last Recorded Vitals  Blood pressure 105/57, pulse 61, temperature 36.1 °C (97 °F), temperature source Temporal, resp. rate 18, height 1.549 m (5' 1\"), weight 72.6 kg (160 lb), SpO2 92 %.  Intake/Output last 3 Shifts:  I/O last 3 completed shifts:  In: 200 (2.8 mL/kg) [I.V.:100 (1.4 mL/kg); IV Piggyback:100]  Out: - (0 mL/kg)   Weight: 72.6 kg     Last CBC & BMP  Lab Results   Component Value Date    GLUCOSE 123 (H) 11/16/2023    CALCIUM 8.7 11/16/2023     11/16/2023    K 3.8 11/16/2023    CO2 40 (HH) 11/16/2023    CL 92 (L) 11/16/2023    BUN 12 11/16/2023    CREATININE 0.65 11/16/2023     Lab Results   Component Value Date    WBC 4.3 (L) 11/16/2023    WBC 4.3 (L) 11/16/2023    HGB 11.2 (L) 11/16/2023    HGB 11.2 (L) 11/16/2023    HCT 36.7 11/16/2023    HCT 36.7 11/16/2023    MCV 96 11/16/2023    MCV 96 11/16/2023     (L) 11/16/2023     (L) 11/16/2023       ASSESSMENT & PLAN  Zayda Grace is a 83 y.o. female presenting with past medical history significant for prior PE was (2016), DVT (2016), COPD on 3 L home oxygen, presents to the emergency department confusion.  Daughter is at bedside supplementing history.  Family mentioned she has been progressively more confused and having multiple falls over the past month. She " normally sundown's after 3 PM however her mentation has changed from baseline.  Her last fall was on Friday evening and she reported not hitting her head or having a loss of consciousness.     Daily Progress:  11/14: PT/OT [facility] 10/11.  Neuro recommends MRI brain without contrast, checking B12, folate, B1, TSH.  MoCA test pending.  Cardiology consult pending. Podiatry consult pending for foot care/mild ulcerations causing pain (Voltaren gel ordered). Long-term placement pending.  11/15: MRI and neuro labs negative.  Starting steroids, IV Lasix to oral Lasix twice daily 40 Mg, doxycycline in D5W, ordered CXR.  16: MoCA score 9, expert eval pending.  CXR shows lower lobe infiltrates.  Continue antibiotics and steroids.  Pending family decision on SNF.  Cardiology recommends considering addition of SGLT2 inhibitor.  Following social work, possibility of assisted living.    #Recurrent falls  #Progressive mental/cognitive decline over the past month  #Acute metabolic encephalopathy  #bilateral pneumonia  -MoCA score 9  -Per patient's daughters, noted that she been having recurrent falls, most recent falls on Friday did not sustain any injury to the head.  -Family noted that patient is progressively getting confused usually around 3 PM started to sundown.  Does live alone and take care of medications  -CXR shows lower lobe infiltrates  -MRI, B12, folate, TSH normal  Plan  -Methylprednisolone 20 Mg IV daily, ceftriaxone/doxycycline 11/15-, cultures/urine antigens pending  -Expert eval needed for MoCA score of 9.  Following social work, possibility of assisted living.  -Avoid hospital associated delirium.  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  Plan  -Blood cultures pending     #Congestive diastolic heart failure ejection fraction 45-50%  #Bilateral lower extremity edema secondary to above  #Atrial fibrillation  #Hypertension  #Hyperlipidemia  -11/14 echo shows EF 45-50%, moderate to severely elevated right ventricular systolic pressure  -Patient had a echocardiogram done from [facility] however records are not available within the computer.  I was able to review the records provided by the family members which showed ejection fraction 50 to 55% with dilated left and right atria and mild aortic valve stenosis.  Plan  -Continue home 125 mcg digoxin, 120 Mg diltiazem, 25 mg losartan and 10 mg atorvastatin.  -Transition to oral Lasix 40 Mg twice daily  -Cardiology consult recommends considering addition of SGLT 2 inhibitor  -Podiatry consult pending for foot care/mild ulcerations causing pain (Voltaren gel ordered)     #COPD, 3 L at baseline  #Pulmonary hypertension  #History of DVT/PE (2016)  -At baseline patient is on 3 L nasal cannula  -Patient has recent diagnosis of pulmonary hypertension however did not have any formal work-up.  She does follow-up with a pulmonologist and was recommended to see a cardiologist however patient had not had the time to make appointment.  Plan  -Continue patient on 2 L home oxygen  -Supportive therapy; Incentive spirometry, home Spiriva inhaler, and,  DuoNebs as needed  -Continue Eliquis      #Constipation  -Over a week since last bowel movement  -Polyethylene glycol daily, docusate twice daily    #Hypomagnesemia  -Replete as necessary >2       #Depression  #GERD  #Incontinence  -Continue home sertraline 50 mg daily  -Continue home omeprazole 40 Mg  -Continue home oxybutynin 10mg every day        DVT prophylaxis: Home Eliquis  Diet:  Easy to chew diet  Consults: Cardiology, neurology, PT/OT ([facility] 10/11), social work  Code: DNR/DNI  Dispo: Telemetry       Shadia Carey, DO  PGY-1, Internal Medicine  Please SecureChat for any further questions  This is a preliminary note, please await attending attestation for final A/P

## 2023-11-16 NOTE — CARE PLAN
The patient's goals for the shift include  pt/ot evaluation    The clinical goals for the shift include Patient will demonstrate the use of the call light throughout the shift    Over the shift, the patient did not make progress toward the following goals. Barriers to progression include acute illness. Recommendations to address these barriers include communication.

## 2023-11-16 NOTE — CONSULTS
PODIATRY CONSULT NOTE      REASON FOR CONSULT: Foot care  REQUESTING PHYSICIAN: Jourdan Ly  PRIMARY CARE PHYSICIAN: Lisa Torre, APRN-CNP    Subjective   HPI:  Ms. Grace is a 83 y.o. female who presents for for foot care.  Patient states that she attempted to trim her toenails causing several wounds on the digits.  Patient is complaining of dry scaly skin on her lower legs.  Patient is also complaining of pain in her posterior right heel.    Patient presented to [facility] on 11/13/2023 with past medical history significant for prior PE was (2016), DVT (2016), COPD on 3 L home oxygen, presents to the emergency department confusion.  Daughter was at bedside supplementing history in ED.  Family mentioned she has been progressively more confused and having multiple falls over the past month. She normally sundown's after 3 PM however her mentation has changed from baseline.  Her last fall was on Friday evening and she reported not hitting her head or having a loss of consciousness.  Patient will normally fall from her bed, which she will then call her daughter.  Patient denies chest pain, shortness of breath.  She states she has been having normal oral intake over the past few weeks however daughter thinks that it has declined.  Family have pushed for assisted living in the past however daughter refuses. She lives alone and is able to complete most of her activities of daily living according to her daughters, however are concerned because of this decline in mentation.  Patient sometimes thinks that her  lives with her even though he has past 23 years ago.  Patient endorses left knee pain unrelated to fall that has been present for a long time.     Podiatry was consulted for foot care.    ROS: 10-point review of systems was performed and is otherwise negative except as noted in HPI.  PMH: Reviewed/documented below.  PSH:  Noncontributory except per HPI   FH: Reviewed and noncontributory   SOCIAL:   Reviewed/documented below.  ALLERGIES: Reviewed/documented below.  MEDS: Reviewed/documented below.  VS: Reviewed/documented below.    Past Medical History:   Diagnosis Date    Acute embolism and thrombosis of other specified deep vein of lower extremity, bilateral (CMS/HCC) 12/16/2016    Deep vein thrombosis (DVT) of other vein of both lower extremities    Personal history of pulmonary embolism 12/16/2016    History of pulmonary embolism     Past Surgical History:   Procedure Laterality Date    BACK SURGERY  12/16/2016    Back Surgery    KNEE SURGERY  12/16/2016    Knee Surgery Left    MR HEAD ANGIO WO IV CONTRAST  7/8/2016    MR HEAD ANGIO WO IV CONTRAST 7/8/2016 [facility]    MR NECK ANGIO WO IV CONTRAST  7/8/2016    MR NECK ANGIO WO IV CONTRAST 7/8/2016 [facility] INPATIENT [facility]GAC[facility]     No family history on file.  Social History     Tobacco Use    Smoking status: Never     Passive exposure: Never    Smokeless tobacco: Never      Medications Prior to Admission   Medication Sig Dispense Refill Last Dose    albuterol 90 mcg/actuation inhaler Inhale 2 puffs every 4 hours if needed.       apixaban (Eliquis) 5 mg tablet Take 1 tablet (5 mg) by mouth 2 times a day.       atorvastatin (Lipitor) 10 mg tablet Take 1 tablet (10 mg) by mouth once daily at bedtime.       digoxin (Lanoxin) 125 MCG tablet Take 1 tablet (125 mcg) by mouth once daily.       dilTIAZem ER (Tiazac) 120 mg 24 hr capsule Take 1 capsule (120 mg) by mouth once daily.       fluticasone (Flonase) 50 mcg/actuation nasal spray Administer 2 sprays into affected nostril(s) once daily as needed.       furosemide (Lasix) 80 mg tablet Take 1 tablet (80 mg) by mouth once daily.       ipratropium-albuteroL (Duo-Neb) 0.5-2.5 mg/3 mL nebulizer solution Take 3 mL by nebulization every 6 hours if needed.       losartan (Cozaar) 25 mg tablet Take 0.5 tablets (12.5 mg) by mouth once daily.       omeprazole (PriLOSEC) 40 mg DR capsule Take 1 capsule (40 mg) by mouth  once daily.       oxybutynin XL (Ditropan-XL) 10 mg 24 hr tablet Take 1 tablet (10 mg) by mouth once daily.       potassium chloride CR 20 mEq ER tablet Take 2 tablets (40 mEq) by mouth twice a day. Crushes in pudding       sertraline (Zoloft) 50 mg tablet Take 1 tablet (50 mg) by mouth once daily.       tiotropium-olodateroL (Stiolto Respimat) 2.5-2.5 mcg/actuation mist inhaler Inhale 2 Inhalations once daily.       cholecalciferol (Vitamin D-3) 50 mcg (2,000 unit) capsule Take 1 tablet by mouth once daily.       cyanocobalamin (Vitamin B-12) 500 mcg tablet Take 1 tablet (500 mcg) by mouth once daily.       polyethylene glycol (Glycolax, Miralax) 17 gram packet Take 17 g by mouth once daily as needed.           Medications:  Scheduled Meds: albuterol, 2.5 mg, nebulization, TID  apixaban, 5 mg, oral, BID  atorvastatin, 10 mg, oral, Nightly  cefTRIAXone, 1 g, intravenous, q24h  digoxin, 125 mcg, oral, Daily  dilTIAZem ER, 120 mg, oral, Daily  docusate sodium, 100 mg, oral, BID  doxycycline, 100 mg, intravenous, q12h  tiotropium, 2 Inhalation, inhalation, Daily   And  formoterol, 20 mcg, nebulization, q12h  furosemide, 40 mg, oral, BID with meals  losartan, 12.5 mg, oral, Daily  methylPREDNISolone sodium succinate (PF), 20 mg, intravenous, q12h  [Held by provider] oxybutynin, 10 mg, oral, Nightly  pantoprazole, 40 mg, oral, Daily before breakfast  perflutren lipid microspheres, 0.5-10 mL of dilution, intravenous, Once in imaging  perflutren protein A microsphere, 0.5 mL, intravenous, Once in imaging  polyethylene glycol, 17 g, oral, Daily  sertraline, 50 mg, oral, Daily  sodium chloride, 3 mL, nebulization, TID  sulfur hexafluoride microsphr, 2 mL, intravenous, Once in imaging      Continuous Infusions:    PRN Meds: PRN medications: acetaminophen, albuterol, diclofenac sodium    Allergies as of 11/13/2023 - Reviewed 11/13/2023   Allergen Reaction Noted    Codeine Hives and Shortness of breath 06/02/2016    Etodolac  Swelling 09/27/2006    Hydromorphone Nausea And Vomiting 10/01/2014    Lisinopril Cough 06/30/2009    Tramadol Other 07/28/2014            Objective   PHYSICAL EXAM:  Physical Exam Performed:  Vitals:    11/16/23 1347   BP:    Pulse:    Resp:    Temp:    SpO2: 92%     Body mass index is 30.23 kg/m².    Patient is alert and in no acute distress. Patient is cooperative.  Patient is receiving breathing treatment during exam.    Vascular: Palpable DP/PT pulses B/L. Mild edema noted B/L. Hair growth absent B/L. CFT<5 to B/L hallux. Temperature is warm to cool from tibial tuberosity to distal digits B/L. No lymphatic streaking noted B/L.    Musculoskeletal: Gross active and passive ROM intact to age and activity level. Moves all extremities spontaneously.  Pain to palpation of the posterior aspect of the right heel.  There is also pain upon palpation of the wound on the fifth right digit and first left digit.    Neurological: Intact light touch sensation B/L. Pain stimuli intact B/L.     Dermatologic: Nails 1-5 are yellow, thickened, recently debrided and have subungual debris B/L.  Several of the nails have jagged edges.  Several of the toes bilateral foot have dried blood noted on them.  skin appears diffusely xerotic B/L.  Dry scaly skin on the right and left lower leg consistent with chronic venous stasis.  Erythema of the right left lower leg also noted consistent with chronic venous stasis, no calor is appreciated.  There is no weeping ,no bulla, no leg ulcerations noted at this time.  There is pain upon palpation of the posterior aspect of the right heel, no ecchymosis, no bulla, no erythema, no ulcer noted in this area.    Wound:  #1  Distal aspect of the fifth right digit nailbed.  This wound is superficial depth with a red granular base.  No active drainage and no periwound cellulitis appreciated.    #2  Distal aspect of the left lateral hallux nail bed.  This wound does have a superficial depth a red granular  base.  No active drainage or periwound cellulitis noted.    #3  Distal aspect of the left third digit nailbed is a firm dry black eschar.  No cellulitis appreciated.    LABS:   Results for orders placed or performed during the hospital encounter of 11/13/23 (from the past 24 hour(s))   SST TOP   Result Value Ref Range    Extra Tube Hold for add-ons.    Lavender Top   Result Value Ref Range    Extra Tube Hold for add-ons.    Streptococcus pneumoniae Antigen, Urine    Specimen: Urine   Result Value Ref Range    Streptococcus pneumoniae Ag, Urine Negative Negative   Legionella Antigen, Urine    Specimen: Urine   Result Value Ref Range    L. pneumophila Urine Ag Negative Negative   Magnesium   Result Value Ref Range    Magnesium 1.80 1.60 - 2.40 mg/dL   Basic Metabolic Panel   Result Value Ref Range    Glucose 123 (H) 74 - 99 mg/dL    Sodium 137 136 - 145 mmol/L    Potassium 3.8 3.5 - 5.3 mmol/L    Chloride 92 (L) 98 - 107 mmol/L    Bicarbonate 40 (HH) 21 - 32 mmol/L    Anion Gap 9 (L) 10 - 20 mmol/L    Urea Nitrogen 12 6 - 23 mg/dL    Creatinine 0.65 0.50 - 1.05 mg/dL    eGFR 86 >60 mL/min/1.73m*2    Calcium 8.7 8.6 - 10.3 mg/dL   CBC   Result Value Ref Range    WBC 4.3 (L) 4.4 - 11.3 x10*3/uL    nRBC 0.0 0.0 - 0.0 /100 WBCs    RBC 3.83 (L) 4.00 - 5.20 x10*6/uL    Hemoglobin 11.2 (L) 12.0 - 16.0 g/dL    Hematocrit 36.7 36.0 - 46.0 %    MCV 96 80 - 100 fL    MCH 29.2 26.0 - 34.0 pg    MCHC 30.5 (L) 32.0 - 36.0 g/dL    RDW 12.7 11.5 - 14.5 %    Platelets 145 (L) 150 - 450 x10*3/uL   CBC and Auto Differential   Result Value Ref Range    WBC 4.3 (L) 4.4 - 11.3 x10*3/uL    nRBC 0.0 0.0 - 0.0 /100 WBCs    RBC 3.83 (L) 4.00 - 5.20 x10*6/uL    Hemoglobin 11.2 (L) 12.0 - 16.0 g/dL    Hematocrit 36.7 36.0 - 46.0 %    MCV 96 80 - 100 fL    MCH 29.2 26.0 - 34.0 pg    MCHC 30.5 (L) 32.0 - 36.0 g/dL    RDW 12.7 11.5 - 14.5 %    Platelets 145 (L) 150 - 450 x10*3/uL    Neutrophils % 86.3 40.0 - 80.0 %    Immature Granulocytes %,  "Automated 0.5 0.0 - 0.9 %    Lymphocytes % 5.9 13.0 - 44.0 %    Monocytes % 7.3 2.0 - 10.0 %    Eosinophils % 0.0 0.0 - 6.0 %    Basophils % 0.0 0.0 - 2.0 %    Neutrophils Absolute 3.77 1.60 - 5.50 x10*3/uL    Immature Granulocytes Absolute, Automated 0.02 0.00 - 0.50 x10*3/uL    Lymphocytes Absolute 0.26 (L) 0.80 - 3.00 x10*3/uL    Monocytes Absolute 0.32 0.05 - 0.80 x10*3/uL    Eosinophils Absolute 0.00 0.00 - 0.40 x10*3/uL    Basophils Absolute 0.00 0.00 - 0.10 x10*3/uL      Lab Results   Component Value Date    HGBA1C 5.7 (H) 09/26/2022      No results found for: \"CRP\"   No results found for: \"SEDRATE\"     Results from last 7 days   Lab Units 11/16/23  0721   WBC AUTO x10*3/uL 4.3*  4.3*   RBC AUTO x10*6/uL 3.83*  3.83*   HEMOGLOBIN g/dL 11.2*  11.2*   HEMATOCRIT % 36.7  36.7     Results from last 7 days   Lab Units 11/16/23  0721 11/15/23  0243 11/14/23  0519 11/13/23  1220   SODIUM mmol/L 137   < > 140 142   POTASSIUM mmol/L 3.8   < > 3.5 3.9   CHLORIDE mmol/L 92*   < > 97* 99   CO2 mmol/L 40*   < > 38* 37*   BUN mg/dL 12   < > 10 14   CREATININE mg/dL 0.65   < > 0.65 0.75   CALCIUM mg/dL 8.7   < > 8.8 9.3   PHOSPHORUS mg/dL  --   --  2.6  --    MAGNESIUM mg/dL 1.80   < > 2.25 1.56*   BILIRUBIN TOTAL mg/dL  --   --   --  0.6   ALT U/L  --   --   --  10   AST U/L  --   --   --  17    < > = values in this interval not displayed.     Results from last 7 days   Lab Units 11/13/23  1422   COLOR U  Yellow   APPEARANCE U  Hazy*   PH U  6.0   SPEC GRAV UR  1.016   PROTEIN U mg/dL 30 (1+)*   BLOOD UR  MODERATE (2+)*   NITRITE U  NEGATIVE   WBC UR /HPF 1-5            Assessment/Plan   #Multiple stable ulcerations on the toes secondary to attempted self-debridement  #Venous stasis dermatitis bilateral lower leg  #Right posterior heel pain  - Patient was seen and evaluated; all findings were discussed and all questions were answered to patient's satisfaction.  - Charts, labs, vitals and imaging all reviewed.   -All " foot wounds were cleaned with hydrogen peroxide  - Dressings: Antibiotic bacterial ointment and Band-Aid daily to the wounds on the toes  -Debrided jagged edges of toenails as needed without complications  -Ammonium lactate ordered to be applied to legs daily  -Heels were offloaded on a pillow.  Heel protector boots ordered             Alee Amaral DPM Consults

## 2023-11-17 NOTE — PROGRESS NOTES
"Physical Therapy    Physical Therapy Treatment    Patient Name: Zayda Grace  MRN: [id number]  Today's Date: 11/17/2023  Time Calculation  Start Time: 0915  Stop Time: 0938  Time Calculation (min): 23 min       Assessment/Plan   PT Assessment  Evaluation/Treatment Tolerance: Patient tolerated treatment well, Patient limited by fatigue  End of Session Patient Position: Up in chair, Alarm on     PT Plan  Treatment/Interventions: Bed mobility, Transfer training, Gait training  PT Plan: Skilled PT  PT Frequency: 3 times per week  PT Discharge Recommendations: Moderate intensity level of continued care  PT Recommended Transfer Status: Assist x2  PT - OK to Discharge: Yes      General Visit Information:   PT  Visit  PT Received On: 11/17/23  General  Prior to Session Communication: Bedside nurse  Patient Position Received: Bed, 2 rail up, Alarm on  General Comment:  (pt very pleasant; eager to participate in therapy session)    Subjective   Precautions:  Precautions  Medical Precautions: Fall precautions  Precautions Comment:  (IV, O2 via NC, and purewick)       Objective      Cognition:  Cognition  Overall Cognitive Status:  (somewhat confused; pt stating \"this is the first time I've walked in a year\" end of tx session.)     Treatments:  Bed Mobility  Bed Mobility:  (sup -> sit with SBA using bed rail to assist)    Ambulation/Gait Training  Ambulation/Gait Training Performed:  (pt ambulates ~5 ft bed -> chair with FWW and min A x 2.  VC for sequencing.  unsteady though no episodes LOB.  pt fatigued after, politely declining further transfer/ambulation attempt.)    Transfers  Transfer:  (sit <> stand with FWW and min A x 2.  VC for hand placement, sequencing, and upright posture.)    Outcome Measures:  [facility] Basic Mobility  Turning from your back to your side while in a flat bed without using bedrails: A little  Moving from lying on your back to sitting on the side of a flat bed without using bedrails: A little  Moving to " and from bed to chair (including a wheelchair): A lot  Standing up from a chair using your arms (e.g. wheelchair or bedside chair): A lot  To walk in hospital room: A lot  Climbing 3-5 steps with railing: Total  Basic Mobility - Total Score: 13    Education Documentation  Mobility Training, taught by Effie Trujillo PTA at 11/17/2023 12:03 PM.  Learner: Patient  Readiness: Eager  Method: Explanation  Response: Verbalizes Understanding, Needs Reinforcement    Education Comments  No comments found.        EDUCATION:       Encounter Problems       Encounter Problems (Active)       PT Problem       bed mob (Progressing)       Start:  11/14/23    Expected End:  12/05/23       Min assist 1-2 bed mob           abbott (Progressing)       Start:  11/14/23    Expected End:  12/05/23       Min assist 1-2 abbott           gait (Progressing)       Start:  11/14/23    Expected End:  12/05/23       Amb 10 ft> w/ a wh walker and min assist 1-2

## 2023-11-17 NOTE — PROGRESS NOTES
"SUBJECTIVE  She states she feels much better sitting in a chair and is able to ambulate freely.  Overnight oxygen was titrated up to 8 L for SPO2 of 86%, however oxygen titrated down to 4 L and can tolerate.    OBJECTIVE    Physical Exam:  General:  Pleasant and cooperative. No apparent distress.  HEENT:  Normocephalic, atraumatic  Chest:  Clear to auscultation bilaterally.  Bilateral upper and lower rales  CV:  Regular rate and rhythm. No murmurs    Abdomen: Abdomen is soft, non-tender, non-distended. BS +   Extremities:  No lower extremity edema or cyanosis.   Neurological: No focal deficits.  Skin:  Warm and dry.      Last Recorded Vitals  Blood pressure 114/65, pulse 69, temperature 37.1 °C (98.8 °F), temperature source Tympanic, resp. rate 18, height 1.549 m (5' 1\"), weight 72.6 kg (160 lb), SpO2 100 %.  Intake/Output last 3 Shifts:  I/O last 3 completed shifts:  In: 300 (4.1 mL/kg) [I.V.:100 (1.4 mL/kg); IV Piggyback:200]  Out: 1400 (19.3 mL/kg) [Urine:1400 (0.5 mL/kg/hr)]  Weight: 72.6 kg     Last CBC & BMP  Lab Results   Component Value Date    GLUCOSE 104 (H) 11/17/2023    CALCIUM 8.7 11/17/2023     11/17/2023    K 3.6 11/17/2023    CO2 41 (HH) 11/17/2023    CL 97 (L) 11/17/2023    BUN 18 11/17/2023    CREATININE 0.60 11/17/2023     Lab Results   Component Value Date    WBC 6.0 11/17/2023    HGB 11.8 (L) 11/17/2023    HCT 38.3 11/17/2023    MCV 98 11/17/2023     11/17/2023       ASSESSMENT & PLAN  Zayda Grace is a 83 y.o. female presenting with past medical history significant for prior PE was (2016), DVT (2016), COPD on 3 L home oxygen, presents to the emergency department confusion.  Daughter is at bedside supplementing history.  Family mentioned she has been progressively more confused and having multiple falls over the past month. She normally sundown's after 3 PM however her mentation has changed from baseline.  Her last fall was on Friday evening and she reported not hitting her head or " having a loss of consciousness.     Daily Progress:  11/14: PT/OT [facility] 10/[facility].  Neuro recommends MRI brain without contrast, checking B12, folate, B1, TSH.  MoCA test pending.  Cardiology consult pending. Podiatry consult pending for foot care/mild ulcerations causing pain (Voltaren gel ordered). Long-term placement pending.  11/15: MRI and neuro labs negative.  Starting steroids, IV Lasix to oral Lasix twice daily 40 Mg, doxycycline in D5W, ordered CXR.  11/16: MoCA score 9, expert eval pending.  CXR shows lower lobe infiltrates.  Continue antibiotics and steroids.  Pending family decision on SNF.  Cardiology recommends considering addition of SGLT2 inhibitor.  Following social work, possibility of assisted living.  11/17: Expert eval completed.  Continue ceftriaxone/doxycycline 11/15-.  SGLT2 inhibitor started.  IV prednisone to oral prednisone.  Multiple SNF acceptances, awaiting daughter's decision.    #Recurrent falls  #Progressive mental/cognitive decline over the past month  #Acute metabolic encephalopathy  #bilateral pneumonia  -MoCA score 9  -Per patient's daughters, noted that she been having recurrent falls, most recent falls on Friday did not sustain any injury to the head.  -Family noted that patient is progressively getting confused usually around 3 PM started to sundown.  Does live alone and take care of medications  -CXR shows lower lobe infiltrates  -MRI, B12, folate, TSH normal  Plan  -Methylprednisolone 20 Mg oral daily, ceftriaxone/doxycycline 11/15-, cultures/urine antigens pending  -Expert eval needed for MoCA score of 9.  Following social work, possibility of assisted living.  -Avoid hospital associated delirium.  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  Plan  -Blood cultures pending     #Congestive diastolic heart failure ejection fraction 45-50%  #Bilateral lower extremity edema secondary to above  #Atrial fibrillation  #Hypertension  #Hyperlipidemia  -11/14 echo shows EF 45-50%, moderate to severely elevated right ventricular systolic pressure  -[facility] are not available within the computer.  I was able to review the records provided by the family members which showed ejection fraction 50 to 55% with dilated left and right atria and mild aortic valve stenosis.  Plan  -Starting SGLT2 inhibitor  -Continue home 125 mcg digoxin, 120 Mg diltiazem, 25 mg losartan and 10 mg atorvastatin.  -Transition to oral Lasix 40 Mg twice daily  -Cardiology consult recommends considering addition of SGLT 2 inhibitor  -Podiatry consult pending for foot care/mild ulcerations causing pain (Voltaren gel ordered)     #COPD, 3 L at baseline  #Pulmonary hypertension  #History of DVT/PE (2016)  -At baseline patient is on 3 L nasal cannula  -Patient has recent diagnosis of pulmonary hypertension however did not have any formal work-up.  She does follow-up with a pulmonologist and was recommended to see a cardiologist however patient had not had the time to make appointment.  Plan  -Continue patient on 2 L home oxygen  -Supportive therapy; Incentive spirometry, home Spiriva inhaler, and,  DuoNebs as needed  -Continue Eliquis      #Constipation  -Over a week since last bowel movement  -Polyethylene glycol daily, docusate twice daily    #Multiple stable ulcerations on the toes secondary to attempted self-debridement  #Venous stasis dermatitis bilateral lower leg  #Right posterior heel pain  -Podiatry  recs for antibiotic ointment, ammonium lactate to be applied to legs, and debrided jagged toenails.    #Hypomagnesemia  -Replete as necessary >2       #Depression  #GERD  #Incontinence  -Continue home sertraline 50 mg daily  -Continue home omeprazole 40 Mg  -Continue home oxybutynin 10mg every day        DVT prophylaxis: Home Eliquis  Diet: Easy to chew diet  Consults: Cardiology, neurology, PT/OT ([facility] 10/13), social work  Code: DNR/DNI  Dispo: Telemetry       Shadia Carey, DO  PGY-1, Internal Medicine  Please SecureChat for any further questions  This is a preliminary note, please await attending attestation for final A/P

## 2023-11-17 NOTE — PROGRESS NOTES
Cardiology Progress Note      Zayda Grace is a 83 y.o. female on day 0 of admission presenting with Altered mental status.      Subjective   She is feeling okay.  I saw her earlier this morning and therefore she states that it is a little too early to tell.  Her breathing has been fine.  She denies chest pain but has pain everywhere else.       ROS:  10 systems reviewed other than what is mentioned above.     MEDICATION:    Scheduled medications  albuterol, 2.5 mg, nebulization, TID  ammonium lactate, 1 Application, Topical, Daily  apixaban, 5 mg, oral, BID  atorvastatin, 10 mg, oral, Nightly  cefTRIAXone, 1 g, intravenous, q24h  digoxin, 125 mcg, oral, Daily  dilTIAZem ER, 120 mg, oral, Daily  docusate sodium, 100 mg, oral, BID  doxycycline, 100 mg, intravenous, q12h  tiotropium, 2 Inhalation, inhalation, Daily   And  formoterol, 20 mcg, nebulization, q12h  furosemide, 40 mg, oral, BID with meals  losartan, 12.5 mg, oral, Daily  methylPREDNISolone sodium succinate (PF), 20 mg, intravenous, q12h  [Held by provider] oxybutynin, 10 mg, oral, Nightly  pantoprazole, 40 mg, oral, Daily before breakfast  perflutren lipid microspheres, 0.5-10 mL of dilution, intravenous, Once in imaging  perflutren protein A microsphere, 0.5 mL, intravenous, Once in imaging  polyethylene glycol, 17 g, oral, Daily  sertraline, 50 mg, oral, Daily  sodium chloride, 3 mL, nebulization, TID  sulfur hexafluoride microsphr, 2 mL, intravenous, Once in imaging      Continuous medications     PRN medications  PRN medications: acetaminophen, albuterol, diclofenac sodium, oxygen     Principal Problem:    Altered mental status  Active Problems:    Shortness of breath      OBJECTIVE:    Visit Vitals  /69 (BP Location: Left arm, Patient Position: Lying)   Pulse 55   Temp 35.7 °C (96.3 °F) (Temporal)   Resp 16          Intake/Output Summary (Last 24 hours) at 11/17/2023 0747  Last data filed at 11/17/2023 0600  Gross per 24 hour   Intake 100 ml    Output 1400 ml   Net -1300 ml                Patient is alert and oriented x3.  HEENT is unremarkable mucous members are moist  Neck no JVP no bruits upstrokes are full no thyromegaly  Lungs are clear bilaterally.  No wheezing crackles or rales  Heart regular rhythm normal S1-S2 there is no S3 no murmurs are heard.  Abdomen is soft vessels are positive nontender nondistended no organomegaly no pulsatile masses  Extremities have no edema.  Distal pulses present palpable.  Neuro is grossly nonfocal  Skin has no rashes     Image Results  XR chest 1 view  Narrative: STUDY:  Chest Radiograph;  11/15/2023 11:23 AM.  INDICATION:  Hypoxia.  COMPARISON:  Chest, single portable view obtained on 11/13/2023 at 11:35 hours (two  images obtained).  ACCESSION NUMBER(S):  [id number]  ORDERING CLINICIAN:  TACOS BRADFORD  TECHNIQUE:  Frontal chest was obtained at 11:11 hours.  FINDINGS:  CARDIOMEDIASTINAL SILHOUETTE:  The heart is enlarged.     LUNGS:  Multifocal patchy regions of airspace opacity both lungs.  Small  pleural effusions.  Patient rotation towards the left.     ABDOMEN:  No remarkable upper abdominal findings.     BONES:  Scoliosis thoracic spine.  Impression: Multifocal patchy pulmonary infiltrates.  Small pleural effusions.  Cardiomegaly.  Signed by Clive Rudolph MD        Relevant Results:  RESULTS:    Lab Results   Component Value Date    WBC 6.0 11/17/2023    HGB 11.8 (L) 11/17/2023    HCT 38.3 11/17/2023    MCV 98 11/17/2023     11/17/2023        Lab Results   Component Value Date    CREATININE 0.65 11/16/2023    BUN 12 11/16/2023     11/16/2023    K 3.8 11/16/2023    CL 92 (L) 11/16/2023    CO2 40 (HH) 11/16/2023        Results from last 7 days   Lab Units 11/16/23  0721 11/15/23  0243 11/14/23  1720 11/14/23  0519 11/13/23  1220   PROTEIN TOTAL g/dL  --   --   --   --  6.3*   BILIRUBIN TOTAL mg/dL  --   --   --   --  0.6   ALK PHOS U/L  --   --   --   --  82   ALT U/L  --   --   --   --  10   AST  U/L  --   --   --   --  17   GLUCOSE mg/dL 123*   < >  --    < > 84   TSH mIU/L  --   --  1.58  --   --    BNP pg/mL  --   --   --   --  185*    < > = values in this interval not displayed.       Assessment/Plan    Past Medical History:     1.  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  DVT with pulmonary embolism in 2016.  3.  Atrial fibrillation  4.  History of diastolic heart failure  5.  Some cognitive impairment  6.  Back surgery  7.  Knee surgery     11/16/2023     1.  Possible mild diastolic heart failure.  BNP only modestly elevated.  She did not have her diuretics at home for couple days which is likely the cause of volume retention.  Agree with transitioning IV diuretics to oral diuretics if she appears to be reasonably euvolemic.  She continues to have crackles with expiratory wheezes in her lungs consistent with her underlying COPD.  Continue with Lasix, losartan.  Consider the addition of an SGLT2 inhibitor.     2.  Atrial fibrillation.  Duration unclear.  She is on Eliquis for this and a previous DVT PE in 2016.  Rates are controlled with diltiazem and digoxin.     3.  Hyperlipidemia she is on statins     Thank you for this consult.  I will follow along as needed    11/17/2023    1.  Mild diastolic heart failure likely related to her concomitant medical conditions but also not taking her diuretics.  Currently more euvolemic.  Still with wheezing on examination and shortness of breath related to her underlying lung disease.  Doing thing to add would be an SGLT2 inhibitor given her HFpEF.  Continue oral diuretics as she was doing.    2.  Atrial fibrillation.  I have no idea how much has been it.  She is already rate controlled with diltiazem and on Eliquis.  Avoiding beta-blockers because of her wheezing.    Kailey Schmitt MD

## 2023-11-18 PROBLEM — R41.0 DISORIENTATION: Status: ACTIVE | Noted: 2023-01-01

## 2023-11-18 NOTE — PROGRESS NOTES
"SUBJECTIVE  Patient sitting upright in chair drinking coffee.  She states that there was an issue with her CPAP yesterday night and she was not able to receive.  She is still on 3 L of oxygen.  800 cc urine noted.  She mentions her legs have been dry and slightly swollen.    OBJECTIVE    Physical Exam:  General:  Pleasant and cooperative. No apparent distress.  HEENT:  Normocephalic, atraumatic  Chest:  Clear to auscultation bilaterally.  Bilateral upper and lower rales  CV:  Regular rate and rhythm. No murmurs    Abdomen: Abdomen is soft, non-tender, non-distended. BS +   Extremities:  No lower extremity edema or cyanosis.   Neurological: No focal deficits.  Skin:  Warm and dry.      Last Recorded Vitals  Blood pressure 136/83, pulse 62, temperature 36 °C (96.8 °F), temperature source Temporal, resp. rate 18, height 1.549 m (5' 1\"), weight 72.6 kg (160 lb), SpO2 95 %.  Intake/Output last 3 Shifts:  I/O last 3 completed shifts:  In: - (0 mL/kg)   Out: 3050 (42 mL/kg) [Urine:3050 (1.2 mL/kg/hr)]  Weight: 72.6 kg     Last CBC & BMP  Lab Results   Component Value Date    GLUCOSE 92 11/18/2023    CALCIUM 8.4 (L) 11/18/2023     11/18/2023    K 3.5 11/18/2023    CO2 42 (HH) 11/18/2023    CL 97 (L) 11/18/2023    BUN 24 (H) 11/18/2023    CREATININE 0.60 11/18/2023     Lab Results   Component Value Date    WBC 5.4 11/18/2023    HGB 10.8 (L) 11/18/2023    HCT 35.4 (L) 11/18/2023    MCV 98 11/18/2023     11/18/2023       ASSESSMENT & PLAN  Zayda Grace is a 83 y.o. female presenting with past medical history significant for prior PE was (2016), DVT (2016), COPD on 3 L home oxygen, presents to the emergency department confusion.  Daughter is at bedside supplementing history.  Family mentioned she has been progressively more confused and having multiple falls over the past month. She normally sundown's after 3 PM however her mentation has changed from baseline.  Her last fall was on Friday evening and she reported " not hitting her head or having a loss of consciousness.     Daily Progress:  11/14: PT/OT [facility] 10/11.  Neuro recommends MRI brain without contrast, checking B12, folate, B1, TSH.  MoCA test pending.  Cardiology consult pending. Podiatry consult pending for foot care/mild ulcerations causing pain (Voltaren gel ordered). Long-term placement pending.  11/15: MRI and neuro labs negative.  Starting steroids, IV Lasix to oral Lasix twice daily 40 Mg, doxycycline in D5W, ordered CXR.  11/16: MoCA score 9, expert eval pending.  CXR shows lower lobe infiltrates.  Continue antibiotics and steroids.  Pending family decision on SNF.  Cardiology recommends considering addition of SGLT2 inhibitor.  Following social work, possibility of assisted living.  11/17: Continue ceftriaxone/doxycycline 11/15-.  SGLT2 inhibitor started.  IV prednisone to oral prednisone.  Multiple SNF acceptances, awaiting daughter's decision.  11/18: Hydrocortisone cream for dry legs.  Transition twice daily oral diuretics to daily.  Awaiting daughter's decision on SNF.    #Recurrent falls  #Progressive mental/cognitive decline over the past month  #Acute metabolic encephalopathy  #bilateral pneumonia  -MoCA score 9, expert eval to be complete 11/18  -Per patient's daughters, noted that she been having recurrent falls, most recent falls on Friday did not sustain any injury to the head.  -Family noted that patient is progressively getting confused usually around 3 PM started to sundown.  Does live alone and take care of medications  -CXR shows lower lobe infiltrates  -MRI, B12, folate, TSH normal  Plan  -Methylprednisolone 20 Mg oral daily, ceftriaxone/doxycycline 11/15-  -Pending SNF decision from daughter, long-term to consider assisted living  -Avoid hospital associated delirium.  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  Plan  -Blood cultures pending     #Congestive diastolic heart failure ejection fraction 45-50%  #Bilateral lower extremity edema secondary to above  #Atrial fibrillation  #Hypertension  #Hyperlipidemia  -11/14 echo shows EF 45-50%, moderate to severely elevated right ventricular systolic pressure  -Patient had a echocardiogram done from [facility] however records are not available within the computer.  I was able to review the records provided by the family members which showed ejection fraction 50 to 55% with dilated left and right atria and mild aortic valve stenosis.  Plan  -Continue SGLT2 inhibitor  -Continue home 125 mcg digoxin, 120 Mg diltiazem, 25 mg losartan and 10 mg atorvastatin.  -oral Lasix 40 Mg daily     #COPD, 3 L at baseline  #Pulmonary hypertension  #History of DVT/PE (2016)  -At baseline patient is on 3 L nasal cannula  -Patient has recent diagnosis of pulmonary hypertension however did not have any formal work-up.  She does follow-up with a pulmonologist and was recommended to see a cardiologist however patient had not had the time to make appointment.  Plan  -Continue patient on 2 L home oxygen  -Supportive therapy; Incentive spirometry, home Spiriva inhaler, and,  DuoNebs as needed  -Continue Eliquis      #Constipation  -Over a week since last bowel movement  -Polyethylene glycol daily, docusate twice daily    #Multiple stable ulcerations on the toes secondary to attempted self-debridement  #Venous stasis dermatitis bilateral lower leg  #Right posterior heel pain  -Podiatry recs for antibiotic ointment, ammonium lactate to be applied to legs, and debrided  jagged toenails.    #Hypomagnesemia  -Replete as necessary >2       #Depression  #GERD  #Incontinence  -Continue home sertraline 50 mg daily  -Continue home omeprazole 40 Mg  -Continue home oxybutynin 10mg every day        DVT prophylaxis: Home Eliquis  Diet: Easy to chew diet  Consults: Cardiology, neurology, PT/OT ([facility] 10/13), social work  Code: DNR/DNI  Dispo: Telemetry       Shadia Carey, DO  PGY-1, Internal Medicine  Please SecureChat for any further questions  This is a preliminary note, please await attending attestation for final A/P

## 2023-11-18 NOTE — PROGRESS NOTES
Cardiology Progress Note      Zayda Grace is a 83 y.o. female on day 0 of admission presenting with Altered mental status.      Subjective   Feeling quite good this morning.  Diffuse body aches.  No chest pain no shortness of breath no coughing.       ROS:  10 systems reviewed other than what is mentioned above.     MEDICATION:    Scheduled medications  albuterol, 2.5 mg, nebulization, TID  ammonium lactate, 1 Application, Topical, Daily  apixaban, 5 mg, oral, BID  atorvastatin, 10 mg, oral, Nightly  cefTRIAXone, 1 g, intravenous, q24h  dapagliflozin propanediol, 10 mg, oral, Daily  digoxin, 125 mcg, oral, Daily  dilTIAZem ER, 120 mg, oral, Daily  docusate sodium, 100 mg, oral, BID  doxycycline, 100 mg, intravenous, q12h  tiotropium, 2 Inhalation, inhalation, Daily   And  formoterol, 20 mcg, nebulization, q12h  furosemide, 40 mg, oral, BID with meals  losartan, 12.5 mg, oral, Daily  [Held by provider] oxybutynin, 10 mg, oral, Nightly  pantoprazole, 40 mg, oral, Daily before breakfast  perflutren lipid microspheres, 0.5-10 mL of dilution, intravenous, Once in imaging  perflutren protein A microsphere, 0.5 mL, intravenous, Once in imaging  polyethylene glycol, 17 g, oral, Daily  potassium chloride, 20 mEq, intravenous, Once  potassium chloride CR, 40 mEq, oral, Once  predniSONE, 20 mg, oral, Daily  sertraline, 50 mg, oral, Daily  sodium chloride, 3 mL, nebulization, TID  sulfur hexafluoride microsphr, 2 mL, intravenous, Once in imaging      Continuous medications     PRN medications  PRN medications: acetaminophen, albuterol, diclofenac sodium, oxygen     Principal Problem:    Altered mental status  Active Problems:    Shortness of breath      OBJECTIVE:    Visit Vitals  /83 (BP Location: Left arm, Patient Position: Lying)   Pulse 62   Temp 36 °C (96.8 °F) (Temporal)   Resp 18          Intake/Output Summary (Last 24 hours) at 11/18/2023 0826  Last data filed at 11/17/2023 2200  Gross per 24 hour   Intake --    Output 1650 ml   Net -1650 ml                  Patient is alert and oriented x3.  HEENT is unremarkable mucous members are moist  Neck no JVP no bruits upstrokes are full no thyromegaly  Lungs are clear bilaterally.  No wheezing crackles or rales  Heart irregular rhythm normal S1-S2 there is no S3 2/6 holosystolic murmur at the axilla abdomen is soft vessels are positive nontender nondistended no organomegaly no pulsatile masses  Extremities have no edema.  Distal pulses present palpable.  Neuro is grossly nonfocal  Skin has no rashes     Image Results  XR chest 1 view  Narrative: STUDY:  Chest Radiograph;  11/15/2023 11:23 AM.  INDICATION:  Hypoxia.  COMPARISON:  Chest, single portable view obtained on 11/13/2023 at 11:35 hours (two  images obtained).  ACCESSION NUMBER(S):  [id number]  ORDERING CLINICIAN:  TACOS BRADFORD  TECHNIQUE:  Frontal chest was obtained at 11:11 hours.  FINDINGS:  CARDIOMEDIASTINAL SILHOUETTE:  The heart is enlarged.     LUNGS:  Multifocal patchy regions of airspace opacity both lungs.  Small  pleural effusions.  Patient rotation towards the left.     ABDOMEN:  No remarkable upper abdominal findings.     BONES:  Scoliosis thoracic spine.  Impression: Multifocal patchy pulmonary infiltrates.  Small pleural effusions.  Cardiomegaly.  Signed by Clive Rudolph MD        Relevant Results:  RESULTS:    Lab Results   Component Value Date    WBC 5.4 11/18/2023    HGB 10.8 (L) 11/18/2023    HCT 35.4 (L) 11/18/2023    MCV 98 11/18/2023     11/18/2023        Lab Results   Component Value Date    CREATININE 0.60 11/18/2023    BUN 24 (H) 11/18/2023     11/18/2023    K 3.5 11/18/2023    CL 97 (L) 11/18/2023    CO2 42 (HH) 11/18/2023        Results from last 7 days   Lab Units 11/18/23  0648 11/15/23  0243 11/14/23  1720 11/14/23  0519 11/13/23  1220   PROTEIN TOTAL g/dL  --   --   --   --  6.3*   BILIRUBIN TOTAL mg/dL  --   --   --   --  0.6   ALK PHOS U/L  --   --   --   --  82   ALT U/L   --   --   --   --  10   AST U/L  --   --   --   --  17   GLUCOSE mg/dL 92   < >  --    < > 84   TSH mIU/L  --   --  1.58  --   --    BNP pg/mL  --   --   --   --  185*    < > = values in this interval not displayed.         Assessment/Plan    Past Medical History:     1.  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  DVT with pulmonary embolism in 2016.  3.  Atrial fibrillation  4.  History of diastolic heart failure  5.  Some cognitive impairment  6.  Back surgery  7.  Knee surgery     11/16/2023     1.  Possible mild diastolic heart failure.  BNP only modestly elevated.  She did not have her diuretics at home for couple days which is likely the cause of volume retention.  Agree with transitioning IV diuretics to oral diuretics if she appears to be reasonably euvolemic.  She continues to have crackles with expiratory wheezes in her lungs consistent with her underlying COPD.  Continue with Lasix, losartan.  Consider the addition of an SGLT2 inhibitor.     2.  Atrial fibrillation.  Duration unclear.  She is on Eliquis for this and a previous DVT PE in 2016.  Rates are controlled with diltiazem and digoxin.     3.  Hyperlipidemia she is on statins     Thank you for this consult.  I will follow along as needed    11/17/2023    1.  Mild diastolic heart failure likely related to her concomitant medical conditions but also not taking her diuretics.  Currently more euvolemic.  Still with wheezing on examination and shortness of breath related to her underlying lung disease.  Doing thing to add would be an SGLT2 inhibitor given her HFpEF.  Continue oral diuretics as she was doing.    2.  Atrial fibrillation.  I have no idea how much has been it.  She is already rate controlled with diltiazem and on Eliquis.  Avoiding beta-blockers because of her wheezing.    11/18/2023    1.  Mild HFpEF.  Improved.  Renal function stable.  Volume status is more euvolemic.  Wheezing has improved.  SGLT2 inhibitor started for her HFpEF.  Continue oral  diuretics but decrease to daily.    2.  Atrial fibrillation.  Rate is controlled.  Continue diltiazem.  She has been on digoxin and therefore we will continue it.  She is on Eliquis for rate control.    3.  Hypokalemia and hypomagnesemia.  These will be repleted.    Kailey Schmitt MD

## 2023-11-18 NOTE — CARE PLAN
The patient's goals for the shift include      The clinical goals for the shift include Patient will have no complaints of shortness of breath throughout the shift    Over the shift, the patient did not make progress toward the following goals. Barriers to progression include requesting breathing treatments. Recommendations to address these barriers include assess for s/s of SOB frequently.

## 2023-11-19 NOTE — PROGRESS NOTES
Cardiology Progress Note      Zayda Grace is a 83 y.o. female on day 1 of admission presenting with Altered mental status.      Subjective   Continues to feel okay.  Able to rest last evening.  No chest pain no palpitations       ROS:  10 systems reviewed other than what is mentioned above.     MEDICATION:    Scheduled medications  albuterol, 2.5 mg, nebulization, TID  ammonium lactate, 1 Application, Topical, Daily  apixaban, 5 mg, oral, BID  atorvastatin, 10 mg, oral, Nightly  cefTRIAXone, 1 g, intravenous, q24h  dapagliflozin propanediol, 10 mg, oral, Daily  digoxin, 125 mcg, oral, Daily  dilTIAZem ER, 120 mg, oral, Daily  docusate sodium, 100 mg, oral, BID  doxycycline, 100 mg, intravenous, q12h  tiotropium, 2 Inhalation, inhalation, Daily   And  formoterol, 20 mcg, nebulization, q12h  furosemide, 40 mg, oral, Daily  hydrocortisone, , Topical, BID  losartan, 12.5 mg, oral, Daily  [Held by provider] oxybutynin, 10 mg, oral, Nightly  pantoprazole, 40 mg, oral, Daily before breakfast  perflutren lipid microspheres, 0.5-10 mL of dilution, intravenous, Once in imaging  perflutren protein A microsphere, 0.5 mL, intravenous, Once in imaging  polyethylene glycol, 17 g, oral, Daily  sertraline, 50 mg, oral, Daily  sodium chloride, 3 mL, nebulization, TID  sulfur hexafluoride microsphr, 2 mL, intravenous, Once in imaging      Continuous medications     PRN medications  PRN medications: acetaminophen, albuterol, diclofenac sodium, oxygen     Principal Problem:    Altered mental status  Active Problems:    Shortness of breath    Disorientation      OBJECTIVE:    Visit Vitals  /78 (BP Location: Left arm, Patient Position: Lying)   Pulse 66   Temp 36 °C (96.8 °F) (Temporal)   Resp 20          Intake/Output Summary (Last 24 hours) at 11/19/2023 1032  Last data filed at 11/19/2023 0600  Gross per 24 hour   Intake 550 ml   Output 950 ml   Net -400 ml                  Patient is alert and oriented x3.  HEENT is unremarkable  mucous members are moist  Neck no JVP no bruits upstrokes are full no thyromegaly  Lungs are clear bilaterally.  No wheezing crackles or rales  Heart irregular rhythm normal S1-S2 there is no S3 2/6 holosystolic murmur at the axilla abdomen is soft vessels are positive nontender nondistended no organomegaly no pulsatile masses  Extremities have no edema.  Distal pulses present palpable.  Neuro is grossly nonfocal  Skin has no rashes     Image Results  XR chest 1 view  Narrative: STUDY:  Chest Radiograph;  11/15/2023 11:23 AM.  INDICATION:  Hypoxia.  COMPARISON:  Chest, single portable view obtained on 11/13/2023 at 11:35 hours (two  images obtained).  ACCESSION NUMBER(S):  [id number]  ORDERING CLINICIAN:  TACOS BRADFORD  TECHNIQUE:  Frontal chest was obtained at 11:11 hours.  FINDINGS:  CARDIOMEDIASTINAL SILHOUETTE:  The heart is enlarged.     LUNGS:  Multifocal patchy regions of airspace opacity both lungs.  Small  pleural effusions.  Patient rotation towards the left.     ABDOMEN:  No remarkable upper abdominal findings.     BONES:  Scoliosis thoracic spine.  Impression: Multifocal patchy pulmonary infiltrates.  Small pleural effusions.  Cardiomegaly.  Signed by Clive Rudolph MD        Relevant Results:  RESULTS:    Lab Results   Component Value Date    WBC 5.6 11/19/2023    HGB 12.4 11/19/2023    HCT 39.7 11/19/2023     11/19/2023     11/19/2023        Lab Results   Component Value Date    CREATININE 0.71 11/19/2023    BUN 26 (H) 11/19/2023     11/19/2023    K 4.2 11/19/2023    CL 98 11/19/2023    CO2 40 (HH) 11/19/2023        Results from last 7 days   Lab Units 11/19/23  0729 11/15/23  0243 11/14/23  1720 11/14/23  0519 11/13/23  1220   PROTEIN TOTAL g/dL  --   --   --   --  6.3*   BILIRUBIN TOTAL mg/dL  --   --   --   --  0.6   ALK PHOS U/L  --   --   --   --  82   ALT U/L  --   --   --   --  10   AST U/L  --   --   --   --  17   GLUCOSE mg/dL 91   < >  --    < > 84   TSH mIU/L  --   --   1.58  --   --    BNP pg/mL  --   --   --   --  185*    < > = values in this interval not displayed.         Assessment/Plan    Past Medical History:     1.  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  DVT with pulmonary embolism in 2016.  3.  Atrial fibrillation  4.  History of diastolic heart failure  5.  Some cognitive impairment  6.  Back surgery  7.  Knee surgery     11/16/2023     1.  Possible mild diastolic heart failure.  BNP only modestly elevated.  She did not have her diuretics at home for couple days which is likely the cause of volume retention.  Agree with transitioning IV diuretics to oral diuretics if she appears to be reasonably euvolemic.  She continues to have crackles with expiratory wheezes in her lungs consistent with her underlying COPD.  Continue with Lasix, losartan.  Consider the addition of an SGLT2 inhibitor.     2.  Atrial fibrillation.  Duration unclear.  She is on Eliquis for this and a previous DVT PE in 2016.  Rates are controlled with diltiazem and digoxin.     3.  Hyperlipidemia she is on statins     Thank you for this consult.  I will follow along as needed    11/17/2023    1.  Mild diastolic heart failure likely related to her concomitant medical conditions but also not taking her diuretics.  Currently more euvolemic.  Still with wheezing on examination and shortness of breath related to her underlying lung disease.  Doing thing to add would be an SGLT2 inhibitor given her HFpEF.  Continue oral diuretics as she was doing.    2.  Atrial fibrillation.  I have no idea how much has been it.  She is already rate controlled with diltiazem and on Eliquis.  Avoiding beta-blockers because of her wheezing.    11/18/2023    1.  Mild HFpEF.  Improved.  Renal function stable.  Volume status is more euvolemic.  Wheezing has improved.  SGLT2 inhibitor started for her HFpEF.  Continue oral diuretics but decrease to daily.    2.  Atrial fibrillation.  Rate is controlled.  Continue diltiazem.  She has been on  digoxin and therefore we will continue it.  She is on Eliquis for rate control.    3.  Hypokalemia and hypomagnesemia.  These will be repleted.    11/19/2023    1.  HFpEF.  EF 45 to 50%.  Patient is now on an SGLT2 inhibitor.  Continue oral diuretics.  Fairly euvolemic.    2.  Atrial fibrillation.  Rates are controlled.  She is on digoxin and diltiazem.  I will go ahead and discontinue digoxin today.  Continue Eliquis for stroke prophylaxis.    3.  Hypokalemia.  Resolved.  Magnesium is excellent.    Okay for discharge from a cardiac standpoint    Kailey Schmitt MD

## 2023-11-19 NOTE — PROGRESS NOTES
11/19/23 1334   Discharge Planning   Patient expects to be discharged to: snf     11/19/2023   RN stated son here and has SNF choices.  Followed up in room with pt and son, Gregoria. Gregoria stated 1st choice would be [facility], 2nd choice- [facility].  He stated he is the 1st contact and if he does not answer to please leave a message and he will get back to you.  Asked EDMOND lux to make referrals.   Gladis Whaley RN [facility]

## 2023-11-19 NOTE — PROGRESS NOTES
"SUBJECTIVE  Patient sitting upright in bed with breakfast finished.  Patient was able to get some rest last night, 3 L oxygen.  Her breathing and shortness of breath have been up and down but otherwise no complaints.  Patient would like to go home.  She states daughter has been coming to visit every day.    OBJECTIVE    Physical Exam:  General:  Pleasant and cooperative. No apparent distress.  HEENT:  Normocephalic, atraumatic  Chest:  Clear to auscultation bilaterally.  Bilateral upper and lower rales  CV:  Regular rate and rhythm. No murmurs    Abdomen: Abdomen is soft, non-tender, non-distended. BS +   Extremities:  No lower extremity edema or cyanosis.   Neurological: No focal deficits.  Skin:  Warm and dry.      Last Recorded Vitals  Blood pressure 131/78, pulse 66, temperature 36 °C (96.8 °F), temperature source Temporal, resp. rate 20, height 1.549 m (5' 1\"), weight 72.6 kg (160 lb), SpO2 96 %.  Intake/Output last 3 Shifts:  I/O last 3 completed shifts:  In: 550 (7.6 mL/kg) [P.O.:400; I.V.:50 (0.7 mL/kg); IV Piggyback:100]  Out: 2050 (28.2 mL/kg) [Urine:2050 (0.8 mL/kg/hr)]  Weight: 72.6 kg     Last CBC & BMP  Lab Results   Component Value Date    GLUCOSE 91 11/19/2023    CALCIUM 8.5 (L) 11/19/2023     11/19/2023    K 4.2 11/19/2023    CO2 40 (HH) 11/19/2023    CL 98 11/19/2023    BUN 26 (H) 11/19/2023    CREATININE 0.71 11/19/2023     Lab Results   Component Value Date    WBC 5.6 11/19/2023    HGB 12.4 11/19/2023    HCT 39.7 11/19/2023     11/19/2023     11/19/2023       ASSESSMENT & PLAN  Zayda Grace is a 83 y.o. female presenting with past medical history significant for prior PE was (2016), DVT (2016), COPD on 3 L home oxygen, presents to the emergency department confusion.  Daughter is at bedside supplementing history.  Family mentioned she has been progressively more confused and having multiple falls over the past month. She normally sundown's after 3 PM however her mentation has " changed from baseline.  Her last fall was on Friday evening and she reported not hitting her head or having a loss of consciousness.     Daily Progress:  11/14: PT/OT [facility] 10/11.  Neuro recommends MRI brain without contrast, checking B12, folate, B1, TSH.  MoCA test pending.  Cardiology consult pending. Podiatry consult pending for foot care/mild ulcerations causing pain (Voltaren gel ordered). Long-term placement pending.  11/15: MRI and neuro labs negative.  Starting steroids, IV Lasix to oral Lasix twice daily 40 Mg, doxycycline in D5W, ordered CXR.  11/16: MoCA score 9, expert eval pending.  CXR shows lower lobe infiltrates.  Continue antibiotics and steroids.  Pending family decision on SNF.  Cardiology recommends considering addition of SGLT2 inhibitor.  Following social work, possibility of assisted living.  11/17: Continue ceftriaxone/doxycycline 11/15-.  SGLT2 inhibitor started.  IV prednisone to oral prednisone.  Multiple SNF acceptances, awaiting daughter's decision.  11/18: Hydrocortisone cream for dry legs.  Transition twice daily oral diuretics to daily.  Awaiting daughter's decision on SNF.  11/19: Awaiting daughter's decision on SNF.    #Recurrent falls  #Progressive mental/cognitive decline over the past month  #Acute metabolic encephalopathy  #bilateral pneumonia  -MoCA score 9, expert eval completed  -Per patient's daughters, noted that she been having recurrent falls, most recent falls on Friday did not sustain any injury to the head.  -Family noted that patient is progressively getting confused usually around 3 PM started to sundown.  Does live alone and take care of medications  -CXR shows lower lobe infiltrates  -MRI, B12, folate, TSH normal  Plan  -Methylprednisolone 20 Mg oral daily, ceftriaxone/doxycycline 11/15-  -Pending SNF decision from daughter, long-term to consider assisted living  -Avoid hospital associated delirium.  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  Plan  -Blood cultures pending     #Congestive diastolic heart failure ejection fraction 45-50%  #Bilateral lower extremity edema secondary to above  #Atrial fibrillation  #Hypertension  #Hyperlipidemia  -11/14 echo shows EF 45-50%, moderate to severely elevated right ventricular systolic pressure  -Patient had a echocardiogram done from [facility] however records are not available within the computer.  I was able to review the records provided by the family members which showed ejection fraction 50 to 55% with dilated left and right atria and mild aortic valve stenosis.  Plan  -Continue SGLT2 inhibitor  -Continue home 125 mcg digoxin, 120 Mg diltiazem, 25 mg losartan and 10 mg atorvastatin.  -oral Lasix 40 Mg daily     #COPD, 3 L at baseline  #Pulmonary hypertension  #History of DVT/PE (2016)  -At baseline patient is on 3 L nasal cannula  -Patient has recent diagnosis of pulmonary hypertension however did not have any formal work-up.  She does follow-up with a pulmonologist and was recommended to see a cardiologist however patient had not had the time to make appointment.  Plan  -Continue patient on 2 L home oxygen  -Supportive therapy; Incentive spirometry, home Spiriva inhaler, and,  DuoNebs as needed  -Continue Eliquis      #Constipation  -Over a week since last bowel movement  -Polyethylene glycol daily, docusate twice daily    #Multiple stable ulcerations on the toes secondary to attempted self-debridement  #Venous stasis dermatitis bilateral lower leg  #Right  posterior heel pain  -Podiatry recs for antibiotic ointment, ammonium lactate to be applied to legs, and debrided jagged toenails.    #Hypomagnesemia  -Replete as necessary >2       #Depression  #GERD  #Incontinence  -Continue home sertraline 50 mg daily  -Continue home omeprazole 40 Mg  -Continue home oxybutynin 10mg every day        DVT prophylaxis: Home Eliquis  Diet: Easy to chew diet  Consults: Cardiology, neurology, PT/OT ([facility] 10/13), social work  Code: DNR/DNI  Dispo: Telemetry       Shadia Carey, DO  PGY-1, Internal Medicine  Please SecureChat for any further questions  This is a preliminary note, please await attending attestation for final A/P

## 2023-11-19 NOTE — NURSING NOTE
Patient family gave this RN 2 places they woulkd like there mom to go. [facility]  or [facility] . Son Gregoria [phone]

## 2023-11-19 NOTE — CARE PLAN
The patient's goals for the shift include no shortness of breath    The clinical goals for the shift include patient will remain safe during thge shift    Over the shift, the patient did not make progress toward the following goals. Barriers to progression include calling for assistance. Recommendations to address these barriers include frequent rounds and remind pt to call for help.

## 2023-11-19 NOTE — CARE PLAN
The patient's goals for the shift include no shortness of breath    The clinical goals for the shift include patient will be free from breathing problems this shift    Over the shift, the patient did not make progress toward the following goals. Barriers to progression include Patient at time still with weakness . Recommendations to address these barriers include continue to monitor.

## 2023-11-20 NOTE — PROGRESS NOTES
Zayda Grace is a 83 y.o. female on day 2 of admission presenting with Altered mental status.      Subjective   Patient seen and examined at bedside.  Patient had desatted in the early hours of the morning and was placed on CPAP.  However has recovered well and is now back on room air oxygen.  She is alert and orientated.  Eating and drinking well.  No urinary symptoms.  No bowel movements.  No shortness of breath, chest pain, or palpitations.       Objective     Last Recorded Vitals  BP 99/58 (BP Location: Left arm, Patient Position: Lying)   Pulse 80   Temp 36.8 °C (98.2 °F) (Temporal)   Resp 22   Wt 72.6 kg (160 lb)   SpO2 93%   Intake/Output last 3 Shifts:    Intake/Output Summary (Last 24 hours) at 11/20/2023 1758  Last data filed at 11/20/2023 0506  Gross per 24 hour   Intake 700 ml   Output 1350 ml   Net -650 ml       Admission Weight  Weight: 72.6 kg (160 lb) (11/13/23 1131)    Daily Weight  11/13/23 : 72.6 kg (160 lb)    Image Results  XR chest 1 view  Narrative: STUDY:  Chest Radiograph;  11/15/2023 11:23 AM.  INDICATION:  Hypoxia.  COMPARISON:  Chest, single portable view obtained on 11/13/2023 at 11:35 hours (two  images obtained).  ACCESSION NUMBER(S):  [id number]  ORDERING CLINICIAN:  TACOS BRADFORD  TECHNIQUE:  Frontal chest was obtained at 11:11 hours.  FINDINGS:  CARDIOMEDIASTINAL SILHOUETTE:  The heart is enlarged.     LUNGS:  Multifocal patchy regions of airspace opacity both lungs.  Small  pleural effusions.  Patient rotation towards the left.     ABDOMEN:  No remarkable upper abdominal findings.     BONES:  Scoliosis thoracic spine.  Impression: Multifocal patchy pulmonary infiltrates.  Small pleural effusions.  Cardiomegaly.  Signed by Clive Rudolph MD      Physical Exam  Physical Exam:  General:  Pleasant and cooperative. No apparent distress.  HEENT:  Normocephalic, atraumatic  Chest:  Clear to auscultation bilaterally.  Bilateral upper and lower rales  CV:  Regular rate and rhythm. No  murmurs    Abdomen: Abdomen is soft, non-tender, non-distended. BS +   Extremities:  No lower extremity edema or cyanosis.   Neurological: No focal deficits.  Skin:  Warm and dry.  Relevant Results  Results for orders placed or performed during the hospital encounter of 11/13/23 (from the past 24 hour(s))   Magnesium   Result Value Ref Range    Magnesium 1.94 1.60 - 2.40 mg/dL   Basic Metabolic Panel   Result Value Ref Range    Glucose 81 74 - 99 mg/dL    Sodium 142 136 - 145 mmol/L    Potassium 4.3 3.5 - 5.3 mmol/L    Chloride 98 98 - 107 mmol/L    Bicarbonate 41 (HH) 21 - 32 mmol/L    Anion Gap 7 (L) 10 - 20 mmol/L    Urea Nitrogen 27 (H) 6 - 23 mg/dL    Creatinine 0.76 0.50 - 1.05 mg/dL    eGFR 77 >60 mL/min/1.73m*2    Calcium 8.5 (L) 8.6 - 10.3 mg/dL   CBC   Result Value Ref Range    WBC 6.6 4.4 - 11.3 x10*3/uL    nRBC 0.0 0.0 - 0.0 /100 WBCs    RBC 3.86 (L) 4.00 - 5.20 x10*6/uL    Hemoglobin 11.6 (L) 12.0 - 16.0 g/dL    Hematocrit 38.6 36.0 - 46.0 %     80 - 100 fL    MCH 30.1 26.0 - 34.0 pg    MCHC 30.1 (L) 32.0 - 36.0 g/dL    RDW 13.0 11.5 - 14.5 %    Platelets 180 150 - 450 x10*3/uL    XR chest 1 view    Result Date: 11/15/2023  STUDY: Chest Radiograph;  11/15/2023 11:23 AM. INDICATION: Hypoxia. COMPARISON: Chest, single portable view obtained on 11/13/2023 at 11:35 hours (two images obtained). ACCESSION NUMBER(S): [id number] ORDERING CLINICIAN: TACOS BRADFORD TECHNIQUE:  Frontal chest was obtained at 11:11 hours. FINDINGS: CARDIOMEDIASTINAL SILHOUETTE: The heart is enlarged.  LUNGS: Multifocal patchy regions of airspace opacity both lungs.  Small pleural effusions.  Patient rotation towards the left.  ABDOMEN: No remarkable upper abdominal findings.  BONES: Scoliosis thoracic spine.    Multifocal patchy pulmonary infiltrates. Small pleural effusions. Cardiomegaly. Signed by Clive Rudolph MD    MR brain wo IV contrast    Result Date: 11/14/2023  Interpreted By:  Joseph Mays, STUDY: MR BRAIN WO IV  CONTRAST;  11/14/2023 6:57 pm   INDICATION: Signs/Symptoms:Progressive decline over the past month, recurrent falls.   COMPARISON: Head CT, 11/13/2023 and brain MRI, 07/08/2016   ACCESSION NUMBER(S): [id number]   ORDERING CLINICIAN: DELMY HUNT   TECHNIQUE: Axial T2, FLAIR, DWI, gradient echo T2 and sagittal and coronal T1 weighted images of the brain were acquired.  Image quality is degraded by motion.   FINDINGS: CSF Spaces: The ventricles, sulci and basal cisterns are appropriate for the degree of volume loss. No abnormal extra-axial collection.   Parenchyma: There is no restricted diffusion to suggest acute infarction.  Parenchymal volume loss most pronounced in the parietal lobes, unchanged from the most recent comparison but mildly progressed from 2016. Nonspecific T2 hyperintense signal in the white matter is likely secondary to chronic small vessel ischemic disease. No evidence of intracranial hemorrhage. There is no mass effect or midline shift.   Paranasal Sinuses and Mastoids: Scattered paranasal sinus mucosal thickening. The mastoid air cells are clear.       Mildly motion limited exam, no acute intracranial pathology.   MACRO: None   Signed by: Joseph Mays 11/14/2023 7:15 PM Dictation workstation:   [id number]    Transthoracic Echo (TTE) Complete    Result Date: 11/14/2023    [facility], [address] [place] [id number] [phone] and                                 Fax [phone] TRANSTHORACIC ECHOCARDIOGRAM REPORT  Patient Name:      BAM NERI    Reading Physician:    [id number] Killian Carolina MD Study Date:        11/14/2023           Ordering Provider:    [id number] TERI MCLAIN MRN/PID:           [id number]             Fellow: Accession#:        [id number]         Nurse: Date of Birth/Age: 1940 / 83 years Sonographer:          Sinan  Makayla [facility] Gender:            F                    Additional Staff: Height:            154.94 cm            Admit Date:           11/13/2023 Weight:            72.58 kg             Admission Status:     Emergency BSA:               1.72 m2              Encounter#:           [id number]                                         Department Location:  [facility] Blood Pressure: 129 /63 mmHg Study Type:    TRANSTHORACIC ECHO (TTE) COMPLETE Diagnosis/ICD: Chronic systolic (congestive) heart failure (CHF)-I50.22 CPT Code:      Echo Complete w Full Doppler-39570; Myocardial Strain                Imaging-[id number] Patient History: Pertinent History: A-Fib, Cardiomyopathy, CHF, Previous DVT, Hyperlipidemia,                    COPD and Dyspnea. bacteremia. Study Detail: The following Echo studies were performed: 2D, M-Mode, Doppler,               color flow and Strain. Technically challenging study due to body               habitus and the patient's lack of cooperation.  PHYSICIAN INTERPRETATION: Left Ventricle: The left ventricular systolic function is mildly decreased, with an estimated ejection fraction of 45-50%. There is global hypokinesis of the left ventricle with minor regional variations. The left ventricular cavity size is normal. Left Ventricular Global Longitudinal Strain - 15.9 %. Spectral Doppler shows an impaired relaxation pattern of left ventricular diastolic filling. Left Atrium: The left atrium is severely dilated. Right Ventricle: The right ventricle is mildly enlarged. There is mildly reduced right ventricular systolic function. Right Atrium: The right atrium is mildly dilated. Aortic Valve: The aortic valve is probably trileaflet. There is evidence of mild aortic valve stenosis. There is no evidence of aortic valve regurgitation. The peak instantaneous gradient of the aortic valve is 19.2 mmHg. The mean gradient of the aortic valve is 10.0 mmHg. Mitral Valve: The mitral valve is mildly thickened. There is  mild to moderate mitral valve regurgitation. Tricuspid Valve: The tricuspid valve is abnormal. There is moderate tricuspid regurgitation. The Doppler estimated RVSP is moderate to severely elevated at 45.0 mmHg. Pulmonic Valve: The pulmonic valve is structurally normal. There is trace pulmonic valve regurgitation. Pericardium: There is no pericardial effusion noted. Aorta: The aortic root is normal. There is mild dilatation of the ascending aorta. There is mild dilatation of the aortic root. Systemic Veins: The inferior vena cava appears dilated.  CONCLUSIONS:  1. Left ventricular systolic function is mildly decreased with a 45-50% estimated ejection fraction.  2. Spectral Doppler shows an impaired relaxation pattern of left ventricular diastolic filling.  3. There is mildly reduced right ventricular systolic function.  4. The left atrium is severely dilated.  5. Mild to moderate mitral valve regurgitation.  6. The tricuspid valve is abnormal.  7. Moderate to severely elevated right ventricular systolic pressure.  8. Moderate tricuspid regurgitation visualized.  9. Mild aortic valve stenosis. 10. There is global hypokinesis of the left ventricle with minor regional variations. QUANTITATIVE DATA SUMMARY: 2D MEASUREMENTS:                          Normal Ranges: LAs:           5.00 cm   (2.7-4.0cm) IVSd:          1.01 cm   (0.6-1.1cm) LVPWd:         0.95 cm   (0.6-1.1cm) LVIDd:         4.63 cm   (3.9-5.9cm) LVIDs:         3.47 cm LV Mass Index: 90.9 g/m2 LV % FS        25.1 % LA VOLUME:                             Normal Ranges: LA Volume Index: 62.0 ml/m2 RA VOLUME BY A/L METHOD:                       Normal Ranges: RA Area A4C: 28.0 cm2 M-MODE MEASUREMENTS:                  Normal Ranges: Ao Root: 3.80 cm (2.0-3.7cm) LV SYSTOLIC FUNCTION BY 2D PLANIMETRY (MOD):                                          Normal Ranges: EF-A4C View:                      52.4 % (>=55%) EF-A2C View:                      48.3 % EF-Biplane:                        50.7 % Global Longitudinal Strain (GLS): 15.9 % LV DIASTOLIC FUNCTION:                     Normal Ranges: MV Peak E: 0.99 m/s (0.7-1.2 m/s) MV Peak A: 0.37 m/s (0.42-0.7 m/s) E/A Ratio: 2.69     (1.0-2.2) MITRAL VALVE:                 Normal Ranges: MV DT: 185 msec (150-240msec) AORTIC VALVE:                                    Normal Ranges: AoV Vmax:                2.19 m/s  (<=1.7m/s) AoV Peak P.2 mmHg (<20mmHg) AoV Mean PG:             10.0 mmHg (1.7-11.5mmHg) LVOT Max Linwood:            1.11 m/s  (<=1.1m/s) AoV VTI:                 43.10 cm  (18-25cm) LVOT VTI:                21.50 cm LVOT Diameter:           2.10 cm   (1.8-2.4cm) AoV Area, VTI:           1.73 cm2  (2.5-5.5cm2) AoV Area,Vmax:           1.76 cm2  (2.5-4.5cm2) AoV Dimensionless Index: 0.50  RIGHT VENTRICLE: RV Basal 4.66 cm RV Mid   3.04 cm RV Major 8.5 cm TAPSE:   13.4 mm TRICUSPID VALVE/RVSP:                             Normal Ranges: Peak TR Velocity: 3.24 m/s RV Syst Pressure: 45.0 mmHg (< 30mmHg) PULMONIC VALVE:                      Normal Ranges: PV Max Linwood: 1.3 m/s  (0.6-0.9m/s) PV Max P.3 mmHg  [id number] Killian Carolina MD Electronically signed on 2023 at 1:57:36 PM  ** Final **     US renal complete    Result Date: 2023  INDICATION:  Hydronephrosis. TECHNIQUE:  Ultrasound of the kidneys and bladder. COMPARISON:  None available FINDINGS: The right kidney measures 9.8 cm in length.  Renal cortical thinning is present.  There is no hydronephrosis.  There is a single stone located within the lower pole measuring 0.5 cm.  There are multiple renal cysts present with the largest located within the midportion and measuring 1.2 x 1.1 x 1.3 cm. The left kidney measures 8.2 cm in length.  Mild renal cortical thinning is present.  There is no hydronephrosis.  There are no stones.  There is a simple cyst within the midportion measuring 1.0 x 0.9 x 1.0 cm. The bladder is partially distended.  The  bilateral ureteral jets are not visualized.     Nonobstructing right calculus measuring 0.5 cm. Bilateral renal cortical thinning.  No hydronephrosis bilaterally. Bilateral simple cysts. Signed by Hiro Johnson MD    CT head wo IV contrast    Result Date: 11/13/2023  Interpreted By:  Tommy Benites, STUDY: CT HEAD WO IV CONTRAST;  11/13/2023 1:00 pm   INDICATION: confusion.   COMPARISON: None.   ACCESSION NUMBER(S): [id number]   ORDERING CLINICIAN: TERI MCLAIN   TECHNIQUE: Noncontrast axial CT scan of head was performed. Angled reformats in brain and bone windows were generated. The images were reviewed in bone, brain, blood and soft tissue windows.   FINDINGS: CSF Spaces: The ventricles, sulci and basal cisterns are within normal limits. There is no extraaxial fluid collection.   Parenchyma: Significant supratentorial atrophy. The grey-white differentiation is intact. There is no mass effect or midline shift. There is no intracranial hemorrhage.   Calvarium: No acute displaced calvarial fracture.   Paranasal sinuses and mastoids: Visualized paranasal sinuses and mastoids are clear.       No CT evidence of acute intracranial pathology.       MACRO: None       Signed by: Tommy Benites 11/13/2023 1:44 PM Dictation workstation:   [id number]    XR chest 1 view    Result Date: 11/13/2023  Interpreted By:  Tommy Benites, STUDY: XR CHEST 1 VIEW;  11/13/2023 11:43 am   INDICATION: Signs/Symptoms:sob.   COMPARISON: Chest radiograph 11/21/2017   ACCESSION NUMBER(S): [id number]   ORDERING CLINICIAN: TERI MCLAIN   FINDINGS:     CARDIOMEDIASTINAL SILHOUETTE: Cardiomediastinal silhouette is stable in size and configuration.   LUNGS: No consolidation, pneumothorax, or significant effusion. Mild bilateral diffuse reticular changes, favored to be chronic   ABDOMEN: No remarkable upper abdominal findings.   BONES: No acute osseous changes.       1.  No evidence of acute cardiopulmonary process.     Signed by: Tommy  Delmis 11/13/2023 12:00 PM Dictation workstation:   [id number]  Scheduled medications  albuterol, 2.5 mg, nebulization, TID  ammonium lactate, 1 Application, Topical, Daily  apixaban, 5 mg, oral, BID  atorvastatin, 10 mg, oral, Nightly  cefTRIAXone, 1 g, intravenous, q24h  dapagliflozin propanediol, 10 mg, oral, Daily  digoxin, 125 mcg, oral, Daily  dilTIAZem ER, 120 mg, oral, Daily  docusate sodium, 100 mg, oral, BID  doxycycline, 100 mg, intravenous, q12h  tiotropium, 2 Inhalation, inhalation, Daily   And  formoterol, 20 mcg, nebulization, q12h  furosemide, 40 mg, oral, Daily  hydrocortisone, , Topical, BID  losartan, 12.5 mg, oral, Daily  [Held by provider] oxybutynin, 10 mg, oral, Nightly  pantoprazole, 40 mg, oral, Daily before breakfast  perflutren lipid microspheres, 0.5-10 mL of dilution, intravenous, Once in imaging  perflutren protein A microsphere, 0.5 mL, intravenous, Once in imaging  polyethylene glycol, 17 g, oral, Daily  sertraline, 50 mg, oral, Daily  sodium chloride, 3 mL, nebulization, TID  sulfur hexafluoride microsphr, 2 mL, intravenous, Once in imaging      Continuous medications     PRN medications  PRN medications: acetaminophen, albuterol, diclofenac sodium, oxygen        Assessment/Plan      Zayda Grace is a 83 y.o. female presenting with past medical history significant for prior PE was (2016), DVT (2016), COPD on 3 L home oxygen, presents to the emergency department confusion.  Daughter is at bedside supplementing history.  Family mentioned she has been progressively more confused and having multiple falls over the past month. She normally sundown's after 3 PM however her mentation has changed from baseline.  Her last fall was on Friday evening and she reported not hitting her head or having a loss of consciousness.    Daily Progress:  11/14: PT/OT [facility] 10/11.  Neuro recommends MRI brain without contrast, checking B12, folate, B1, TSH.  MoCA test pending.  Cardiology consult pending.  Podiatry consult pending for foot care/mild ulcerations causing pain (Voltaren gel ordered). Long-term placement pending.  11/15: MRI and neuro labs negative.  Starting steroids, IV Lasix to oral Lasix twice daily 40 Mg, doxycycline in D5W, ordered CXR.  11/16: MoCA score 9, expert eval pending.  CXR shows lower lobe infiltrates.  Continue antibiotics and steroids.  Pending family decision on SNF.  Cardiology recommends considering addition of SGLT2 inhibitor.  Following social work, possibility of assisted living.  11/17: Continue ceftriaxone/doxycycline 11/15-.  SGLT2 inhibitor started.  IV prednisone to oral prednisone.  Multiple SNF acceptances, awaiting daughter's decision.  11/18: Hydrocortisone cream for dry legs.  Transition twice daily oral diuretics to daily.  Awaiting daughter's decision on SNF.  11/19: Awaiting daughter's decision on SNF.  11/20: Stop Solu-Medrol today after 5-day course completed.  Day 5 Rocephin/doxycycline.  We will complete antibiotic course for total of 7 days.  Awaiting for pre-CERT for [facility] for SNF.     #Recurrent falls  #Progressive mental/cognitive decline over the past month  #Acute metabolic encephalopathy  #bilateral pneumonia  -MoCA score 9, expert eval completed  -Per patient's daughters, noted that she been having recurrent falls, most recent falls on Friday did not sustain any injury to the head.  -Family noted that patient is progressively getting confused usually around 3 PM started to sundown.  Does live alone and take care of medications  -CXR shows lower lobe infiltrates  -MRI, B12, folate, TSH normal  Plan  -Methylprednisolone 20 Mg oral daily, ceftriaxone/doxycycline 11/15-  -Pending SNF decision from daughter, long-term to consider assisted living  -Avoid hospital associated delirium.  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  Plan  -Blood cultures pending     #Congestive diastolic heart failure ejection fraction 45-50%  #Bilateral lower extremity edema secondary to above  #Atrial fibrillation  #Hypertension  #Hyperlipidemia  -11/14 echo shows EF 45-50%, moderate to severely elevated right ventricular systolic pressure  -Patient had a echocardiogram done from [facility] however records are not available within the computer.  I was able to review the records provided by the family members which showed ejection fraction 50 to 55% with dilated left and right atria and mild aortic valve stenosis.  Plan  -Continue SGLT2 inhibitor  -Continue home 125 mcg digoxin, 120 Mg diltiazem, 25 mg losartan and 10 mg atorvastatin.  -oral Lasix 40 Mg daily     #COPD, 3 L at baseline  #Pulmonary hypertension  #History of DVT/PE (2016)  -At baseline patient is on 3 L nasal cannula  -Patient has recent diagnosis of pulmonary hypertension however did not have any formal work-up.  She does follow-up with a pulmonologist and was recommended to see a cardiologist however patient had not had the time to make appointment.  Plan  -Continue patient on 2 L home oxygen  -Supportive therapy; Incentive spirometry, home Spiriva inhaler, and,  DuoNebs as needed  -Continue Eliquis      #Constipation  -Over a week since last bowel movement  -Polyethylene glycol daily, docusate twice daily     #Multiple stable ulcerations on the toes secondary to attempted self-debridement  #Venous stasis dermatitis bilateral lower leg  #Right posterior heel pain  -Podiatry recs for antibiotic ointment, ammonium lactate to be applied to legs,  and debrided jagged toenails.     #Hypomagnesemia  -Replete as necessary >2        #Depression  #GERD  #Incontinence  -Continue home sertraline 50 mg daily  -Continue home omeprazole 40 Mg  -Continue home oxybutynin 10mg every day        DVT prophylaxis: Home Eliquis  Diet: Easy to chew diet  Consults: Cardiology, neurology, PT/OT ([facility] 10/13), social work  Code: DNR/DNI  Dispo: Telemetry         Mirian Hernández MD  PGY1 Internal Medicine

## 2023-11-20 NOTE — PROGRESS NOTES
11/20/23 1503   Discharge Planning   Type of Post Acute Facility Services Skilled nursing   Patient [facility] to be [facility]: [facility]   Does the patient need discharge transport arranged? Yes   RoundTrip coordination needed? Yes     1500:  Received responses from 1. [facility] accept 2. [facility] accept. Reviewed facility responses with son Gregoria.  [facility] preference.  Will need precert.  Need Therapy for precert.  Therapy notified.

## 2023-11-20 NOTE — CARE PLAN
The patient's goals for the shift include no shortness of breath    The clinical goals for the shift include Patient will remain safe during the shift

## 2023-11-20 NOTE — PROGRESS NOTES
Cardiology Progress Note      Zayda Grace is a 83 y.o. female on day 2 of admission presenting with Altered mental status.      Subjective   Feeling okay.  Using her BiPAP.  No chest pain or pressure       ROS:  10 systems reviewed other than what is mentioned above.     MEDICATION:    Scheduled medications  albuterol, 2.5 mg, nebulization, TID  ammonium lactate, 1 Application, Topical, Daily  apixaban, 5 mg, oral, BID  atorvastatin, 10 mg, oral, Nightly  cefTRIAXone, 1 g, intravenous, q24h  dapagliflozin propanediol, 10 mg, oral, Daily  digoxin, 125 mcg, oral, Daily  dilTIAZem ER, 120 mg, oral, Daily  docusate sodium, 100 mg, oral, BID  doxycycline, 100 mg, intravenous, q12h  tiotropium, 2 Inhalation, inhalation, Daily   And  formoterol, 20 mcg, nebulization, q12h  furosemide, 40 mg, oral, Daily  hydrocortisone, , Topical, BID  losartan, 12.5 mg, oral, Daily  [Held by provider] oxybutynin, 10 mg, oral, Nightly  pantoprazole, 40 mg, oral, Daily before breakfast  perflutren lipid microspheres, 0.5-10 mL of dilution, intravenous, Once in imaging  perflutren protein A microsphere, 0.5 mL, intravenous, Once in imaging  polyethylene glycol, 17 g, oral, Daily  sertraline, 50 mg, oral, Daily  sodium chloride, 3 mL, nebulization, TID  sulfur hexafluoride microsphr, 2 mL, intravenous, Once in imaging      Continuous medications     PRN medications  PRN medications: acetaminophen, albuterol, diclofenac sodium, oxygen     Principal Problem:    Altered mental status  Active Problems:    Shortness of breath    Disorientation      OBJECTIVE:    Visit Vitals  /67 (BP Location: Left arm, Patient Position: Lying)   Pulse 70   Temp 36.8 °C (98.2 °F) (Temporal)   Resp 24          Intake/Output Summary (Last 24 hours) at 11/20/2023 0826  Last data filed at 11/20/2023 0506  Gross per 24 hour   Intake 700 ml   Output 1350 ml   Net -650 ml                  Patient is alert and oriented x3.  HEENT is unremarkable mucous members are  moist  Neck no JVP no bruits upstrokes are full no thyromegaly  Lungs are clear bilaterally.  No wheezing crackles or rales  Heart irregular rhythm normal S1-S2 there is no S3 2/6 holosystolic murmur at the axilla abdomen is soft vessels are positive nontender nondistended no organomegaly no pulsatile masses  Extremities have no edema.  Distal pulses present palpable.  Neuro is grossly nonfocal  Skin has no rashes     Image Results  XR chest 1 view  Narrative: STUDY:  Chest Radiograph;  11/15/2023 11:23 AM.  INDICATION:  Hypoxia.  COMPARISON:  Chest, single portable view obtained on 11/13/2023 at 11:35 hours (two  images obtained).  ACCESSION NUMBER(S):  [id number]  ORDERING CLINICIAN:  TACOS BRADFORD  TECHNIQUE:  Frontal chest was obtained at 11:11 hours.  FINDINGS:  CARDIOMEDIASTINAL SILHOUETTE:  The heart is enlarged.     LUNGS:  Multifocal patchy regions of airspace opacity both lungs.  Small  pleural effusions.  Patient rotation towards the left.     ABDOMEN:  No remarkable upper abdominal findings.     BONES:  Scoliosis thoracic spine.  Impression: Multifocal patchy pulmonary infiltrates.  Small pleural effusions.  Cardiomegaly.  Signed by Clive Rudolph MD        Relevant Results:  RESULTS:    Lab Results   Component Value Date    WBC 6.6 11/20/2023    HGB 11.6 (L) 11/20/2023    HCT 38.6 11/20/2023     11/20/2023     11/20/2023        Lab Results   Component Value Date    CREATININE 0.76 11/20/2023    BUN 27 (H) 11/20/2023     11/20/2023    K 4.3 11/20/2023    CL 98 11/20/2023    CO2 41 (HH) 11/20/2023        Results from last 7 days   Lab Units 11/20/23  0638 11/15/23  0243 11/14/23  1720 11/14/23  0519 11/13/23  1220   PROTEIN TOTAL g/dL  --   --   --   --  6.3*   BILIRUBIN TOTAL mg/dL  --   --   --   --  0.6   ALK PHOS U/L  --   --   --   --  82   ALT U/L  --   --   --   --  10   AST U/L  --   --   --   --  17   GLUCOSE mg/dL 81   < >  --    < > 84   TSH mIU/L  --   --  1.58  --   --     BNP pg/mL  --   --   --   --  185*    < > = values in this interval not displayed.         Assessment/Plan    Past Medical History:     1.  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  DVT with pulmonary embolism in 2016.  3.  Atrial fibrillation  4.  History of diastolic heart failure  5.  Some cognitive impairment  6.  Back surgery  7.  Knee surgery     11/16/2023     1.  Possible mild diastolic heart failure.  BNP only modestly elevated.  She did not have her diuretics at home for couple days which is likely the cause of volume retention.  Agree with transitioning IV diuretics to oral diuretics if she appears to be reasonably euvolemic.  She continues to have crackles with expiratory wheezes in her lungs consistent with her underlying COPD.  Continue with Lasix, losartan.  Consider the addition of an SGLT2 inhibitor.     2.  Atrial fibrillation.  Duration unclear.  She is on Eliquis for this and a previous DVT PE in 2016.  Rates are controlled with diltiazem and digoxin.     3.  Hyperlipidemia she is on statins     Thank you for this consult.  I will follow along as needed    11/17/2023    1.  Mild diastolic heart failure likely related to her concomitant medical conditions but also not taking her diuretics.  Currently more euvolemic.  Still with wheezing on examination and shortness of breath related to her underlying lung disease.  Doing thing to add would be an SGLT2 inhibitor given her HFpEF.  Continue oral diuretics as she was doing.    2.  Atrial fibrillation.  I have no idea how much has been it.  She is already rate controlled with diltiazem and on Eliquis.  Avoiding beta-blockers because of her wheezing.    11/18/2023    1.  Mild HFpEF.  Improved.  Renal function stable.  Volume status is more euvolemic.  Wheezing has improved.  SGLT2 inhibitor started for her HFpEF.  Continue oral diuretics but decrease to daily.    2.  Atrial fibrillation.  Rate is controlled.  Continue diltiazem.  She has been on digoxin and  therefore we will continue it.  She is on Eliquis for rate control.    3.  Hypokalemia and hypomagnesemia.  These will be repleted.    11/19/2023    1.  HFpEF.  EF 45 to 50%.  Patient is now on an SGLT2 inhibitor.  Continue oral diuretics.  Fairly euvolemic.    2.  Atrial fibrillation.  Rates are controlled.  She is on digoxin and diltiazem.  I will go ahead and discontinue digoxin today.  Continue Eliquis for stroke prophylaxis.    3.  Hypokalemia.  Resolved.  Magnesium is excellent.    11/20/2023    1.  HFpEF.  Continue SGLT2 inhibitors and low-dose diuretics.  Excellent urine output.  Currently euvolemic.    2.  Atrial fibrillation with a controlled rate.  She is now off digoxin.  Continue diltiazem and Eliquis for stroke prophylaxis.    Kailey Schmitt MD

## 2023-11-20 NOTE — CARE PLAN
The patient's goals for the shift include no shortness of breath    The clinical goals for the shift include pt will be injury free this shift    Over the shift, the patient did not make progress toward the following goals. Barriers to progression include patient still on 4lt nc with c/o sob on exertion. Recommendations to address these barriers include continue to monitor.

## 2023-11-20 NOTE — PROGRESS NOTES
"Zayda Grace is a 83 y.o. female on day 2 of admission presenting with Altered mental status.    Subjective   Patient has no specific lower extremity complaints however she states that she does feel \"lousy\".  Cannot elaborate on that.       Physical Exam    Objective   Constitutional: Patient alert.  No acute distress.  Resting comfortably in bed.    Psychiatric: Normal mood and affect.  Pleasant and cooperative.    HEENT: Sclera clear.      Respiratory: Breathing unlabored.  O2 per nasal cannula.     Vascular: Palpable DP/PT pulses B/L.  Chronic venous stasis appreciated.   Hair growth absent B/L. CFT<5 to B/L hallux. Temperature is warm to cool from tibial tuberosity to distal digits B/L.  Toes are cool to touch.  No lymphatic streaking noted B/L.     Musculoskeletal: Gross active and passive ROM intact to age and activity level. Moves all extremities spontaneously.  Heels are offloaded.     Neurological: [place] monofilament testing intact.  Intact light touch sensation B/L.  Incision intact.     Dermatologic: Stable dystrophic nails on both feet.  No bleeding or drainage.  Webspaces are dry.  No onychocryptosis at this time.  Previous problem areas with the digits have resolved.  There are some dried blood distal fourth digit nail plate.  There are eschars are noted.  No other bloody areas are appreciated.  Remainder of that nail plate is attached.  No onycholysis appreciated with either foot.  Mild xerosis cutis bilateral.  No pressure areas of the heel.  No ulcers no preulcerative areas.  No erythema no ascending cellulitis.  Xerosis cutis.    Last Recorded Vitals  Blood pressure 131/67, pulse 70, temperature 36.8 °C (98.2 °F), temperature source Temporal, resp. rate 24, height 1.549 m (5' 1\"), weight 72.6 kg (160 lb), SpO2 94 %.    Intake/Output last 3 Shifts:  I/O last 3 completed shifts:  In: 1200 (16.5 mL/kg) [P.O.:1000; IV Piggyback:200]  Out: 2300 (31.7 mL/kg) [Urine:2300 (0.9 mL/kg/hr)]  Weight: " 72.6 kg     Relevant Results  Scheduled medications  albuterol, 2.5 mg, nebulization, TID  ammonium lactate, 1 Application, Topical, Daily  apixaban, 5 mg, oral, BID  atorvastatin, 10 mg, oral, Nightly  cefTRIAXone, 1 g, intravenous, q24h  dapagliflozin propanediol, 10 mg, oral, Daily  digoxin, 125 mcg, oral, Daily  dilTIAZem ER, 120 mg, oral, Daily  docusate sodium, 100 mg, oral, BID  doxycycline, 100 mg, intravenous, q12h  tiotropium, 2 Inhalation, inhalation, Daily   And  formoterol, 20 mcg, nebulization, q12h  furosemide, 40 mg, oral, Daily  hydrocortisone, , Topical, BID  losartan, 12.5 mg, oral, Daily  [Held by provider] oxybutynin, 10 mg, oral, Nightly  pantoprazole, 40 mg, oral, Daily before breakfast  perflutren lipid microspheres, 0.5-10 mL of dilution, intravenous, Once in imaging  perflutren protein A microsphere, 0.5 mL, intravenous, Once in imaging  polyethylene glycol, 17 g, oral, Daily  sertraline, 50 mg, oral, Daily  sodium chloride, 3 mL, nebulization, TID  sulfur hexafluoride microsphr, 2 mL, intravenous, Once in imaging      Continuous medications     PRN medications  PRN medications: acetaminophen, albuterol, diclofenac sodium, oxygen   Results for orders placed or performed during the hospital encounter of 11/13/23 (from the past 24 hour(s))   Magnesium   Result Value Ref Range    Magnesium 1.94 1.60 - 2.40 mg/dL   Basic Metabolic Panel   Result Value Ref Range    Glucose 81 74 - 99 mg/dL    Sodium 142 136 - 145 mmol/L    Potassium 4.3 3.5 - 5.3 mmol/L    Chloride 98 98 - 107 mmol/L    Bicarbonate 41 (HH) 21 - 32 mmol/L    Anion Gap 7 (L) 10 - 20 mmol/L    Urea Nitrogen 27 (H) 6 - 23 mg/dL    Creatinine 0.76 0.50 - 1.05 mg/dL    eGFR 77 >60 mL/min/1.73m*2    Calcium 8.5 (L) 8.6 - 10.3 mg/dL   CBC   Result Value Ref Range    WBC 6.6 4.4 - 11.3 x10*3/uL    nRBC 0.0 0.0 - 0.0 /100 WBCs    RBC 3.86 (L) 4.00 - 5.20 x10*6/uL    Hemoglobin 11.6 (L) 12.0 - 16.0 g/dL    Hematocrit 38.6 36.0 - 46.0 %      80 - 100 fL    MCH 30.1 26.0 - 34.0 pg    MCHC 30.1 (L) 32.0 - 36.0 g/dL    RDW 13.0 11.5 - 14.5 %    Platelets 180 150 - 450 x10*3/uL           Assessment/Plan   Principal Problem:    Altered mental status  Active Problems:    Shortness of breath    Disorientation  Painful nail pathology as previously described.  At this time stable.  Xerosis cutis.  CVI.    -Today's treatment and course of therapy was discussed with the patient in detail. Patient's questions were answered. Proper foot care was discussed. This dictation was done using Dragon computer software and as such may contain grammatical errors.    -Recommend continuing with ammonium lactate to lower extremities.    -Continue offloading the heels.    -Patient can follow-up outpatient as necessary.  For this was written.    -Interval labs and notes reviewed.  Michel Anderson DPM

## 2023-11-21 NOTE — CARE PLAN
The patient's goals for the shift include no shortness of breath    The clinical goals for the shift include not to fall    Over the shift, the patient did not make progress toward the following goals. Barriers to progression include . Recommendations to address these barriers include .

## 2023-11-21 NOTE — PROGRESS NOTES
Physical Therapy    Physical Therapy Treatment    Patient Name: Zayda Grace  MRN: [id number]  Today's Date: 11/21/2023  Time Calculation  Start Time: 0931  Stop Time: 1001  Time Calculation (min): 30 min       Assessment/Plan         PT Plan  Treatment/Interventions: Bed mobility, Transfer training, Gait training  PT Plan: Skilled PT  PT Frequency: 3 times per week  PT Discharge Recommendations: Moderate intensity level of continued care  PT Recommended Transfer Status: Assist x2  PT - OK to Discharge: Yes      General Visit Information:   PT  Visit  PT Received On: 11/21/23       Subjective             Objective                  Treatments:  Therapeutic Exercise  Therapeutic Exercise Performed:  (ble arom ex x 10 reps)    Bed Mobility  Bed Mobility:  (supine to sit sba)    Ambulation/Gait Training  Ambulation/Gait Training Performed:  (ambulated 5 feet using fixed wheeled walker min a x 2   4 l o2  sob)  Transfers  Transfer:  (sit to stand min a x 2)    Outcome Measures:  [facility] Basic Mobility  Turning from your back to your side while in a flat bed without using bedrails: A little  Moving from lying on your back to sitting on the side of a flat bed without using bedrails: A little  Moving to and from bed to chair (including a wheelchair): A lot  Standing up from a chair using your arms (e.g. wheelchair or bedside chair): A lot  To walk in hospital room: A lot  Climbing 3-5 steps with railing: Total  Basic Mobility - Total Score: 13    Education Documentation  Mobility Training, taught by Natalie Forde PTA at 11/21/2023 12:25 PM.  Learner: Patient  Readiness: Acceptance  Method: Demonstration  Response: Demonstrated Understanding    Body Mechanics, taught by Natalie Forde PTA at 11/21/2023 12:25 PM.  Learner: Patient  Readiness: Acceptance  Method: Demonstration  Response: Demonstrated Understanding    Education Comments  No comments found.             Encounter Problems       Encounter Problems (Active)        PT Problem       bed mob (Progressing)       Start:  11/14/23    Expected End:  12/05/23       Min assist 1-2 bed mob           abbott (Progressing)       Start:  11/14/23    Expected End:  12/05/23       Min assist 1-2 abbott           gait (Progressing)       Start:  11/14/23    Expected End:  12/05/23       Amb 10 ft> w/ a wh walker and min assist 1-2

## 2023-11-21 NOTE — PROGRESS NOTES
Occupational Therapy    OT Treatment    Patient Name: Zayda Grace  MRN: [id number]  Today's Date: 11/21/2023  Time Calculation  Start Time: 0831  Stop Time: 0854  Time Calculation (min): 23 min         Assessment:        Plan:  Treatment Interventions: ADL retraining, Functional transfer training, Endurance training, Compensatory technique education, Patient/family training, Equipment evaluation/education (energy conservation/diaphragmatic breathing techniques training)  OT Frequency: 3 times per week  OT Discharge Recommendations: Moderate intensity level of continued care  OT - OK to Discharge: Yes (to next level of care when medically cleared by physician/medical team)  Treatment Interventions: ADL retraining, Functional transfer training, Endurance training, Compensatory technique education, Patient/family training, Equipment evaluation/education (energy conservation/diaphragmatic breathing techniques training)    Subjective   Previous Visit Info:         Objective        Functional Standing Tolerance:  Time:  (pt. tolerated 1.0 min. of static stand balance with min a using walker for support.)  Bed Mobility/Transfers: Bed Mobility  Bed Mobility:  (pt. able to move supine to sit with sba.)    Transfers  Transfer:  (pt. able to move sit to stand and complete bed to chair transfer with min a x2 using a wheeled walker.)               Outcome Measures:[facility] Daily Activity  Putting on and taking off regular lower body clothing: Total  Bathing (including washing, rinsing, drying): A lot  Putting on and taking off regular upper body clothing: A lot  Toileting, which includes using toilet, bedpan or urinal: Total  Taking care of personal grooming such as brushing teeth: A little  Eating Meals: None  Daily Activity - Total Score: 13        Education Documentation  No documentation found.  Education Comments  No comments found.         EDUCATION:       Goals:  Encounter Problems       Encounter Problems (Active)       OT  Goals       Patient with complete upper and lower body bathing/dressing and toileting with minimal assist using adaptive equipment as needed  (Progressing)       Start:  11/14/23    Expected End:  11/28/23            Patient will perform bed mobility and functional transfers safely and minimal assist: bed, chair, commode using DME as needed  (Progressing)       Start:  11/14/23    Expected End:  11/28/23            Patient will tolerate standing for 8 mins. and show good (-) sitting and standing balance during ADL's and functional transfers/mobility  (Progressing)       Start:  11/14/23    Expected End:  11/28/23            apply energy conservation/diaphragmatic breathing techniques to ADL's and functional transfers with minimal cues  (Progressing)       Start:  11/14/23    Expected End:  11/28/23

## 2023-11-21 NOTE — CONSULTS
"Nutrition Note  Reason for Assessment  Reason for Assessment: Length of stay      Recommendation(s):  Conitnue diet texture per pt need monitor for possible supplement need         Assessment    Subjective Data  Food and Nutrient History: Pt is taking % of most of her meals.  She was able to answer some of my questions but not all of them.  She told me she was 66 yrs old and 5'10\" tall, which are both inaccurate.      Difficulty chewing: Pt is on a easy to chew diet   Objective Data  Per Flowsheet Percent Meal intake: 100  Dietary Orders (From admission, onward)       Start     Ordered    11/13/23 1750  Adult diet Easy to chew; Thin 0  Diet effective now        Question Answer Comment   Diet type Easy to chew    Fluid consistency Thin 0        11/13/23 1749                  Independent  Results from last 7 days   Lab Units 11/21/23  0728 11/20/23  0638 11/19/23  0729 11/18/23  0648   GLUCOSE mg/dL 92 81 91 92   SODIUM mmol/L 143 142 141 141   POTASSIUM mmol/L 4.1 4.3 4.2 3.5   CHLORIDE mmol/L 99 98 98 97*   CO2 mmol/L 39* 41* 40* 42*   BUN mg/dL 24* 27* 26* 24*   CREATININE mg/dL 0.65 0.76 0.71 0.60   EGFR mL/min/1.73m*2 86 77 83 88   CALCIUM mg/dL 8.9 8.5* 8.5* 8.4*   MAGNESIUM mg/dL  --  1.94 2.18 1.59*     Lab Results   Component Value Date    HGBA1C 5.7 (H) 09/26/2022    HGBA1C 5.6 09/20/2021    HGBA1C 5.4 10/12/2020         GI per flowsheet:  Gastrointestinal  Gastrointestinal (WDL): Within Defined Limits  Stool Appearance: Soft  Stool Color: Brown  Last bowel movement documented:    PMH: PE; DVT; COPD: 3L home O2; mental decline; acute met encephalopathy; CHF: BLE edema; A FIB: HTN: Pulm HTN  Allergies: Codeine, Etodolac, Hydromorphone, Lisinopril, and Tramadol     Anthropometrics:  Height: 154.9 cm (5' 0.98\")  Weight: 72.6 kg (160 lb 0.9 oz)  BMI (Calculated): 30.26  IBW: 47 kg  %IBW: 152 %             Estimated Nutritional Needs:  Method for Estimating Needs: 5886-0940   26-30 asher kg of IBW    Method " for Estimating Needs: 47-56    1-1.2 gm kg of IBW    Method for Estimating Needs: 900-1400   20-30 ml kg of IBW    Nutrition Focused Physical Findings:   Orbital Fat Pads:  (not easily done)        Pain Score: 0 - No pain  Smith-Baker FACES Pain Rating: No hurt  N-PASS Pain/Agitation Score:  [0]      Nutrition Diagnosis        Patient has Nutrition Diagnosis: Yes  New  Nutrition Diagnosis 1: Biting/chewing (masticatory) difficility  Related to (1): mech causes  As Evidenced by (1): Pt is on a easy to chew diet             Plan    Interventions        Individualized Nutrition Prescription Provided for : Conitnue diet texture per pt need   monitor for possible supplement need    Nutrition Monitoring and Evaluation   Biochemical Data, Medical Tests and Procedures  Monitoring and Evaluation Plan: Electrolyte/renal panel, Glucose/endocrine profile  Food/Nutrient Related History Monitoring  Monitoring and Evaluation Plan: Energy intake, Fluid intake, Amount of food    Progress towards goals: Met    Education Documentation  No documentation found.      Time Spent (min): 45 minutes  Last Date of Nutrition Visit: 11/21/23  Nutrition Follow-Up Needed?: 3-8 days  Follow up Comment: 11/28  NAZANIN

## 2023-11-21 NOTE — PROGRESS NOTES
Cardiology Progress Note      Zayda Grace is a 83 y.o. female on day 3 of admission presenting with Altered mental status.      Subjective   Sleeping with her CPAP.  Denies complaints of chest pain or pressure no shortness of breath.  Anxious for discharge.       ROS:  10 systems reviewed other than what is mentioned above.     MEDICATION:    Scheduled medications  albuterol, 2.5 mg, nebulization, TID  ammonium lactate, 1 Application, Topical, Daily  apixaban, 5 mg, oral, BID  atorvastatin, 10 mg, oral, Nightly  cefTRIAXone, 1 g, intravenous, q24h  dapagliflozin propanediol, 10 mg, oral, Daily  dilTIAZem ER, 120 mg, oral, Daily  docusate sodium, 100 mg, oral, BID  doxycycline, 100 mg, intravenous, q12h  tiotropium, 2 Inhalation, inhalation, Daily   And  formoterol, 20 mcg, nebulization, q12h  furosemide, 40 mg, oral, Daily  hydrocortisone, , Topical, BID  losartan, 12.5 mg, oral, Daily  [Held by provider] oxybutynin, 10 mg, oral, Nightly  pantoprazole, 40 mg, oral, Daily before breakfast  perflutren lipid microspheres, 0.5-10 mL of dilution, intravenous, Once in imaging  perflutren protein A microsphere, 0.5 mL, intravenous, Once in imaging  polyethylene glycol, 17 g, oral, Daily  sertraline, 50 mg, oral, Daily  sodium chloride, 3 mL, nebulization, TID  sulfur hexafluoride microsphr, 2 mL, intravenous, Once in imaging      Continuous medications     PRN medications  PRN medications: acetaminophen, albuterol, diclofenac sodium, oxygen     Principal Problem:    Altered mental status  Active Problems:    Shortness of breath    Disorientation      OBJECTIVE:    Visit Vitals  BP 99/58 (BP Location: Left arm, Patient Position: Lying)   Pulse 80   Temp 36.8 °C (98.2 °F) (Temporal)   Resp 22        No intake or output data in the 24 hours ending 11/21/23 0755               Patient is alert and oriented x3.  HEENT is unremarkable mucous members are moist  Neck no JVP no bruits upstrokes are full no thyromegaly  Lungs are  clear bilaterally.  No wheezing crackles or rales  Heart irregular rhythm normal S1-S2 there is no S3 2/6 holosystolic murmur at the axilla abdomen is soft vessels are positive nontender nondistended no organomegaly no pulsatile masses  Extremities have no edema.  Distal pulses present palpable.  Neuro is grossly nonfocal  Skin has no rashes     Image Results  XR chest 1 view  Narrative: STUDY:  Chest Radiograph;  11/15/2023 11:23 AM.  INDICATION:  Hypoxia.  COMPARISON:  Chest, single portable view obtained on 11/13/2023 at 11:35 hours (two  images obtained).  ACCESSION NUMBER(S):  [id number]  ORDERING CLINICIAN:  TACOS BRADFORD  TECHNIQUE:  Frontal chest was obtained at 11:11 hours.  FINDINGS:  CARDIOMEDIASTINAL SILHOUETTE:  The heart is enlarged.     LUNGS:  Multifocal patchy regions of airspace opacity both lungs.  Small  pleural effusions.  Patient rotation towards the left.     ABDOMEN:  No remarkable upper abdominal findings.     BONES:  Scoliosis thoracic spine.  Impression: Multifocal patchy pulmonary infiltrates.  Small pleural effusions.  Cardiomegaly.  Signed by Clive Rudolph MD        Relevant Results:  RESULTS:    Lab Results   Component Value Date    WBC 6.6 11/20/2023    HGB 11.6 (L) 11/20/2023    HCT 38.6 11/20/2023     11/20/2023     11/20/2023        Lab Results   Component Value Date    CREATININE 0.76 11/20/2023    BUN 27 (H) 11/20/2023     11/20/2023    K 4.3 11/20/2023    CL 98 11/20/2023    CO2 41 (HH) 11/20/2023        Results from last 7 days   Lab Units 11/20/23  0638 11/15/23  0243 11/14/23  1720   GLUCOSE mg/dL 81   < >  --    TSH mIU/L  --   --  1.58    < > = values in this interval not displayed.         Assessment/Plan    Past Medical History:     1.  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  DVT with pulmonary embolism in 2016.  3.  Atrial fibrillation  4.  History of diastolic heart failure  5.  Some cognitive impairment  6.  Back surgery  7.  Knee surgery     11/16/2023      1.  Possible mild diastolic heart failure.  BNP only modestly elevated.  She did not have her diuretics at home for couple days which is likely the cause of volume retention.  Agree with transitioning IV diuretics to oral diuretics if she appears to be reasonably euvolemic.  She continues to have crackles with expiratory wheezes in her lungs consistent with her underlying COPD.  Continue with Lasix, losartan.  Consider the addition of an SGLT2 inhibitor.     2.  Atrial fibrillation.  Duration unclear.  She is on Eliquis for this and a previous DVT PE in 2016.  Rates are controlled with diltiazem and digoxin.     3.  Hyperlipidemia she is on statins     Thank you for this consult.  I will follow along as needed    11/17/2023    1.  Mild diastolic heart failure likely related to her concomitant medical conditions but also not taking her diuretics.  Currently more euvolemic.  Still with wheezing on examination and shortness of breath related to her underlying lung disease.  Doing thing to add would be an SGLT2 inhibitor given her HFpEF.  Continue oral diuretics as she was doing.    2.  Atrial fibrillation.  I have no idea how much has been it.  She is already rate controlled with diltiazem and on Eliquis.  Avoiding beta-blockers because of her wheezing.    11/18/2023    1.  Mild HFpEF.  Improved.  Renal function stable.  Volume status is more euvolemic.  Wheezing has improved.  SGLT2 inhibitor started for her HFpEF.  Continue oral diuretics but decrease to daily.    2.  Atrial fibrillation.  Rate is controlled.  Continue diltiazem.  She has been on digoxin and therefore we will continue it.  She is on Eliquis for rate control.    3.  Hypokalemia and hypomagnesemia.  These will be repleted.    11/19/2023    1.  HFpEF.  EF 45 to 50%.  Patient is now on an SGLT2 inhibitor.  Continue oral diuretics.  Fairly euvolemic.    2.  Atrial fibrillation.  Rates are controlled.  She is on digoxin and diltiazem.  I will go ahead  and discontinue digoxin today.  Continue Eliquis for stroke prophylaxis.    3.  Hypokalemia.  Resolved.  Magnesium is excellent.    11/20/2023    1.  HFpEF.  Continue SGLT2 inhibitors and low-dose diuretics.  Excellent urine output.  Currently euvolemic.    2.  Atrial fibrillation with a controlled rate.  She is now off digoxin.  Continue diltiazem and Eliquis for stroke prophylaxis.    11/21/2023    1.  HFpEF.  She is on SGLT2 inhibitor low-dose diuretics and losartan.  Good urine output.  Appears to euvolemic.  I will stop her digoxin.    2.  Atrial fibrillation.  Rates are controlled with diltiazem.  She is on apixaban for anticoagulation.  No need for digoxin given minimally impaired LV function.    Kailey Schmitt MD

## 2023-11-21 NOTE — CARE PLAN
The patient's goals for the shift include no shortness of breath    The clinical goals for the shift include not to fall    Over the shift, the patient did not make progress toward the following goals. Barriers to progression include calling for assistance. Recommendations to address these barriers include frequent rounding and remind pt to call for assistance.

## 2023-11-21 NOTE — PROGRESS NOTES
Zayda Grace is a 83 y.o. female on day 3 of admission presenting with Altered mental status.      Subjective   Patient seen and examined at bedside.  No acute issues overnight.  Hemodynamically stable.  Patient is comfortable and alert.  Eating and drinking okay.  Normal bowel movements.  No urinary symptoms.  Patient asking to go home.  Explained to her that she is to go to SNF soon.       Objective     Last Recorded Vitals  /56 (BP Location: Right arm, Patient Position: Sitting)   Pulse 73   Temp 36.9 °C (98.4 °F) (Temporal)   Resp 20   Wt 72.6 kg (160 lb)   SpO2 94%   Intake/Output last 3 Shifts:  No intake or output data in the 24 hours ending 11/21/23 1628      Admission Weight  Weight: 72.6 kg (160 lb) (11/13/23 1131)    Daily Weight  11/13/23 : 72.6 kg (160 lb)    Image Results  XR chest 1 view  Narrative: STUDY:  Chest Radiograph;  11/15/2023 11:23 AM.  INDICATION:  Hypoxia.  COMPARISON:  Chest, single portable view obtained on 11/13/2023 at 11:35 hours (two  images obtained).  ACCESSION NUMBER(S):  [id number]  ORDERING CLINICIAN:  TACOS BRADFORD  TECHNIQUE:  Frontal chest was obtained at 11:11 hours.  FINDINGS:  CARDIOMEDIASTINAL SILHOUETTE:  The heart is enlarged.     LUNGS:  Multifocal patchy regions of airspace opacity both lungs.  Small  pleural effusions.  Patient rotation towards the left.     ABDOMEN:  No remarkable upper abdominal findings.     BONES:  Scoliosis thoracic spine.  Impression: Multifocal patchy pulmonary infiltrates.  Small pleural effusions.  Cardiomegaly.  Signed by Clive Rudolph MD      Physical Exam  Physical Exam:  General:  Pleasant and cooperative. No apparent distress.  HEENT:  Normocephalic, atraumatic  Chest:  Clear to auscultation bilaterally.  Bilateral upper and lower rales  CV:  Regular rate and rhythm. No murmurs    Abdomen: Abdomen is soft, non-tender, non-distended. BS +   Extremities:  No lower extremity edema or cyanosis.   Neurological: No focal  deficits.  Skin:  Warm and dry.  Relevant Results  Results for orders placed or performed during the hospital encounter of 11/13/23 (from the past 24 hour(s))   CBC   Result Value Ref Range    WBC 8.3 4.4 - 11.3 x10*3/uL    nRBC 0.0 0.0 - 0.0 /100 WBCs    RBC 4.11 4.00 - 5.20 x10*6/uL    Hemoglobin 12.4 12.0 - 16.0 g/dL    Hematocrit 40.9 36.0 - 46.0 %     80 - 100 fL    MCH 30.2 26.0 - 34.0 pg    MCHC 30.3 (L) 32.0 - 36.0 g/dL    RDW 12.7 11.5 - 14.5 %    Platelets 144 (L) 150 - 450 x10*3/uL   Basic Metabolic Panel   Result Value Ref Range    Glucose 92 74 - 99 mg/dL    Sodium 143 136 - 145 mmol/L    Potassium 4.1 3.5 - 5.3 mmol/L    Chloride 99 98 - 107 mmol/L    Bicarbonate 39 (H) 21 - 32 mmol/L    Anion Gap 9 (L) 10 - 20 mmol/L    Urea Nitrogen 24 (H) 6 - 23 mg/dL    Creatinine 0.65 0.50 - 1.05 mg/dL    eGFR 86 >60 mL/min/1.73m*2    Calcium 8.9 8.6 - 10.3 mg/dL    XR chest 1 view    Result Date: 11/15/2023  STUDY: Chest Radiograph;  11/15/2023 11:23 AM. INDICATION: Hypoxia. COMPARISON: Chest, single portable view obtained on 11/13/2023 at 11:35 hours (two images obtained). ACCESSION NUMBER(S): [id number] ORDERING CLINICIAN: TACOS BRADFORD TECHNIQUE:  Frontal chest was obtained at 11:11 hours. FINDINGS: CARDIOMEDIASTINAL SILHOUETTE: The heart is enlarged.  LUNGS: Multifocal patchy regions of airspace opacity both lungs.  Small pleural effusions.  Patient rotation towards the left.  ABDOMEN: No remarkable upper abdominal findings.  BONES: Scoliosis thoracic spine.    Multifocal patchy pulmonary infiltrates. Small pleural effusions. Cardiomegaly. Signed by Clive Rudolph MD    MR brain wo IV contrast    Result Date: 11/14/2023  Interpreted By:  Joseph Mays, STUDY: MR BRAIN WO IV CONTRAST;  11/14/2023 6:57 pm   INDICATION: Signs/Symptoms:Progressive decline over the past month, recurrent falls.   COMPARISON: Head CT, 11/13/2023 and brain MRI, 07/08/2016   ACCESSION NUMBER(S): [id number]   ORDERING  CLINICIAN: DELMY HUNT   TECHNIQUE: Axial T2, FLAIR, DWI, gradient echo T2 and sagittal and coronal T1 weighted images of the brain were acquired.  Image quality is degraded by motion.   FINDINGS: CSF Spaces: The ventricles, sulci and basal cisterns are appropriate for the degree of volume loss. No abnormal extra-axial collection.   Parenchyma: There is no restricted diffusion to suggest acute infarction.  Parenchymal volume loss most pronounced in the parietal lobes, unchanged from the most recent comparison but mildly progressed from 2016. Nonspecific T2 hyperintense signal in the white matter is likely secondary to chronic small vessel ischemic disease. No evidence of intracranial hemorrhage. There is no mass effect or midline shift.   Paranasal Sinuses and Mastoids: Scattered paranasal sinus mucosal thickening. The mastoid air cells are clear.       Mildly motion limited exam, no acute intracranial pathology.   MACRO: None   Signed by: Joseph Mays 11/14/2023 7:15 PM Dictation workstation:   [id number]    Transthoracic Echo (TTE) Complete    Result Date: 11/14/2023    [facility], [address] [place] [id number] [phone] and                                 Fax [phone] TRANSTHORACIC ECHOCARDIOGRAM REPORT  Patient Name:      BAM NERI    Reading Physician:    [id number] Killian Carolina MD Study Date:        11/14/2023           Ordering Provider:    [id number] TERI MCLAIN MRN/PID:           [id number]             Fellow: Accession#:        [id number]         Nurse: Date of Birth/Age: 1940 / 83 years Sonographer:          Sinan Benavides RDCS Gender:            F                    Additional Staff: Height:            154.94 cm            Admit Date:           11/13/2023 Weight:            72.58 kg             Admission Status:     Emergency BSA:                1.72 m2              Encounter#:           [id number]                                         Department Location:  [facility] Blood Pressure: 129 /63 mmHg Study Type:    TRANSTHORACIC ECHO (TTE) COMPLETE Diagnosis/ICD: Chronic systolic (congestive) heart failure (CHF)-I50.22 CPT Code:      Echo Complete w Full Doppler-97277; Myocardial Strain                Imaging-[id number] Patient History: Pertinent History: A-Fib, Cardiomyopathy, CHF, Previous DVT, Hyperlipidemia,                    COPD and Dyspnea. bacteremia. Study Detail: The following Echo studies were performed: 2D, M-Mode, Doppler,               color flow and Strain. Technically challenging study due to body               habitus and the patient's lack of cooperation.  PHYSICIAN INTERPRETATION: Left Ventricle: The left ventricular systolic function is mildly decreased, with an estimated ejection fraction of 45-50%. There is global hypokinesis of the left ventricle with minor regional variations. The left ventricular cavity size is normal. Left Ventricular Global Longitudinal Strain - 15.9 %. Spectral Doppler shows an impaired relaxation pattern of left ventricular diastolic filling. Left Atrium: The left atrium is severely dilated. Right Ventricle: The right ventricle is mildly enlarged. There is mildly reduced right ventricular systolic function. Right Atrium: The right atrium is mildly dilated. Aortic Valve: The aortic valve is probably trileaflet. There is evidence of mild aortic valve stenosis. There is no evidence of aortic valve regurgitation. The peak instantaneous gradient of the aortic valve is 19.2 mmHg. The mean gradient of the aortic valve is 10.0 mmHg. Mitral Valve: The mitral valve is mildly thickened. There is mild to moderate mitral valve regurgitation. Tricuspid Valve: The tricuspid valve is abnormal. There is moderate tricuspid regurgitation. The Doppler estimated RVSP is moderate to severely elevated at 45.0 mmHg. Pulmonic Valve:  The pulmonic valve is structurally normal. There is trace pulmonic valve regurgitation. Pericardium: There is no pericardial effusion noted. Aorta: The aortic root is normal. There is mild dilatation of the ascending aorta. There is mild dilatation of the aortic root. Systemic Veins: The inferior vena cava appears dilated.  CONCLUSIONS:  1. Left ventricular systolic function is mildly decreased with a 45-50% estimated ejection fraction.  2. Spectral Doppler shows an impaired relaxation pattern of left ventricular diastolic filling.  3. There is mildly reduced right ventricular systolic function.  4. The left atrium is severely dilated.  5. Mild to moderate mitral valve regurgitation.  6. The tricuspid valve is abnormal.  7. Moderate to severely elevated right ventricular systolic pressure.  8. Moderate tricuspid regurgitation visualized.  9. Mild aortic valve stenosis. 10. There is global hypokinesis of the left ventricle with minor regional variations. QUANTITATIVE DATA SUMMARY: 2D MEASUREMENTS:                          Normal Ranges: LAs:           5.00 cm   (2.7-4.0cm) IVSd:          1.01 cm   (0.6-1.1cm) LVPWd:         0.95 cm   (0.6-1.1cm) LVIDd:         4.63 cm   (3.9-5.9cm) LVIDs:         3.47 cm LV Mass Index: 90.9 g/m2 LV % FS        25.1 % LA VOLUME:                             Normal Ranges: LA Volume Index: 62.0 ml/m2 RA VOLUME BY A/L METHOD:                       Normal Ranges: RA Area A4C: 28.0 cm2 M-MODE MEASUREMENTS:                  Normal Ranges: Ao Root: 3.80 cm (2.0-3.7cm) LV SYSTOLIC FUNCTION BY 2D PLANIMETRY (MOD):                                          Normal Ranges: EF-A4C View:                      52.4 % (>=55%) EF-A2C View:                      48.3 % EF-Biplane:                       50.7 % Global Longitudinal Strain (GLS): 15.9 % LV DIASTOLIC FUNCTION:                     Normal Ranges: MV Peak E: 0.99 m/s (0.7-1.2 m/s) MV Peak A: 0.37 m/s (0.42-0.7 m/s) E/A Ratio: 2.69     (1.0-2.2)  MITRAL VALVE:                 Normal Ranges: MV DT: 185 msec (150-240msec) AORTIC VALVE:                                    Normal Ranges: AoV Vmax:                2.19 m/s  (<=1.7m/s) AoV Peak P.2 mmHg (<20mmHg) AoV Mean PG:             10.0 mmHg (1.7-11.5mmHg) LVOT Max Linwood:            1.11 m/s  (<=1.1m/s) AoV VTI:                 43.10 cm  (18-25cm) LVOT VTI:                21.50 cm LVOT Diameter:           2.10 cm   (1.8-2.4cm) AoV Area, VTI:           1.73 cm2  (2.5-5.5cm2) AoV Area,Vmax:           1.76 cm2  (2.5-4.5cm2) AoV Dimensionless Index: 0.50  RIGHT VENTRICLE: RV Basal 4.66 cm RV Mid   3.04 cm RV Major 8.5 cm TAPSE:   13.4 mm TRICUSPID VALVE/RVSP:                             Normal Ranges: Peak TR Velocity: 3.24 m/s RV Syst Pressure: 45.0 mmHg (< 30mmHg) PULMONIC VALVE:                      Normal Ranges: PV Max Linwood: 1.3 m/s  (0.6-0.9m/s) PV Max P.3 mmHg  [id number] Killian Carolina MD Electronically signed on 2023 at 1:57:36 PM  ** Final **     US renal complete    Result Date: 2023  INDICATION:  Hydronephrosis. TECHNIQUE:  Ultrasound of the kidneys and bladder. COMPARISON:  None available FINDINGS: The right kidney measures 9.8 cm in length.  Renal cortical thinning is present.  There is no hydronephrosis.  There is a single stone located within the lower pole measuring 0.5 cm.  There are multiple renal cysts present with the largest located within the midportion and measuring 1.2 x 1.1 x 1.3 cm. The left kidney measures 8.2 cm in length.  Mild renal cortical thinning is present.  There is no hydronephrosis.  There are no stones.  There is a simple cyst within the midportion measuring 1.0 x 0.9 x 1.0 cm. The bladder is partially distended.  The bilateral ureteral jets are not visualized.     Nonobstructing right calculus measuring 0.5 cm. Bilateral renal cortical thinning.  No hydronephrosis bilaterally. Bilateral simple cysts. Signed by Hiro Johnson MD    CT head wo  IV contrast    Result Date: 11/13/2023  Interpreted By:  Tommy Benites, STUDY: CT HEAD WO IV CONTRAST;  11/13/2023 1:00 pm   INDICATION: confusion.   COMPARISON: None.   ACCESSION NUMBER(S): [id number]   ORDERING CLINICIAN: TERI MCLAIN   TECHNIQUE: Noncontrast axial CT scan of head was performed. Angled reformats in brain and bone windows were generated. The images were reviewed in bone, brain, blood and soft tissue windows.   FINDINGS: CSF Spaces: The ventricles, sulci and basal cisterns are within normal limits. There is no extraaxial fluid collection.   Parenchyma: Significant supratentorial atrophy. The grey-white differentiation is intact. There is no mass effect or midline shift. There is no intracranial hemorrhage.   Calvarium: No acute displaced calvarial fracture.   Paranasal sinuses and mastoids: Visualized paranasal sinuses and mastoids are clear.       No CT evidence of acute intracranial pathology.       MACRO: None       Signed by: Tommy Benites 11/13/2023 1:44 PM Dictation workstation:   [id number]    XR chest 1 view    Result Date: 11/13/2023  Interpreted By:  Tommy Benites, STUDY: XR CHEST 1 VIEW;  11/13/2023 11:43 am   INDICATION: Signs/Symptoms:sob.   COMPARISON: Chest radiograph 11/21/2017   ACCESSION NUMBER(S): [id number]   ORDERING CLINICIAN: TERI MCLAIN   FINDINGS:     CARDIOMEDIASTINAL SILHOUETTE: Cardiomediastinal silhouette is stable in size and configuration.   LUNGS: No consolidation, pneumothorax, or significant effusion. Mild bilateral diffuse reticular changes, favored to be chronic   ABDOMEN: No remarkable upper abdominal findings.   BONES: No acute osseous changes.       1.  No evidence of acute cardiopulmonary process.     Signed by: Tommy Benites 11/13/2023 12:00 PM Dictation workstation:   [id number]  Scheduled medications  albuterol, 2.5 mg, nebulization, TID  ammonium lactate, 1 Application, Topical, Daily  apixaban, 5 mg, oral, BID  atorvastatin, 10 mg, oral,  Nightly  cefTRIAXone, 1 g, intravenous, q24h  dapagliflozin propanediol, 10 mg, oral, Daily  dilTIAZem ER, 120 mg, oral, Daily  docusate sodium, 100 mg, oral, BID  doxycycline, 100 mg, intravenous, q12h  tiotropium, 2 Inhalation, inhalation, Daily   And  formoterol, 20 mcg, nebulization, q12h  furosemide, 40 mg, oral, Daily  hydrocortisone, , Topical, BID  losartan, 12.5 mg, oral, Daily  [Held by provider] oxybutynin, 10 mg, oral, Nightly  pantoprazole, 40 mg, oral, Daily before breakfast  perflutren lipid microspheres, 0.5-10 mL of dilution, intravenous, Once in imaging  perflutren protein A microsphere, 0.5 mL, intravenous, Once in imaging  polyethylene glycol, 17 g, oral, Daily  sertraline, 50 mg, oral, Daily  sodium chloride, 3 mL, nebulization, TID  sulfur hexafluoride microsphr, 2 mL, intravenous, Once in imaging      Continuous medications     PRN medications  PRN medications: acetaminophen, albuterol, diclofenac sodium, oxygen        Assessment/Plan      Zayda Grace is a 83 y.o. female presenting with past medical history significant for prior PE was (2016), DVT (2016), COPD on 3 L home oxygen, presents to the emergency department confusion.  Daughter is at bedside supplementing history.  Family mentioned she has been progressively more confused and having multiple falls over the past month. She normally sundown's after 3 PM however her mentation has changed from baseline.  Her last fall was on Friday evening and she reported not hitting her head or having a loss of consciousness.    Daily Progress:  11/14: PT/OT [facility] 10/11.  Neuro recommends MRI brain without contrast, checking B12, folate, B1, TSH.  MoCA test pending.  Cardiology consult pending. Podiatry consult pending for foot care/mild ulcerations causing pain (Voltaren gel ordered). Long-term placement pending.  11/15: MRI and neuro labs negative.  Starting steroids, IV Lasix to oral Lasix twice daily 40 Mg, doxycycline in D5W, ordered CXR.  11/16:  MoCA score 9, expert eval pending.  CXR shows lower lobe infiltrates.  Continue antibiotics and steroids.  Pending family decision on SNF.  Cardiology recommends considering addition of SGLT2 inhibitor.  Following social work, possibility of assisted living.  11/17: Continue ceftriaxone/doxycycline 11/15-.  SGLT2 inhibitor started.  IV prednisone to oral prednisone.  Multiple SNF acceptances, awaiting daughter's decision.  11/18: Hydrocortisone cream for dry legs.  Transition twice daily oral diuretics to daily.  Awaiting daughter's decision on SNF.  11/19: Awaiting daughter's decision on SNF.  11/20: Stop Solu-Medrol today after 5-day course completed.  Day 5 Rocephin/doxycycline.  We will complete antibiotic course for total of 7 days.  Awaiting for pre-CERT for mary Staton for SNF.  11/21: Day 6 Rocephin/doxycycline.  Will complete course of antibiotics tomorrow.  PT/OT AM-PAC score 13.  Pre-CERT team is submitting pre-CERT for simone Staton.  Continue to await.    #Recurrent falls  #Progressive mental/cognitive decline over the past month  #Acute metabolic encephalopathy  #bilateral pneumonia  -MoCA score 9, expert eval completed  -Per patient's daughters, noted that she been having recurrent falls, most recent falls on Friday did not sustain any injury to the head.  -Family noted that patient is progressively getting confused usually around 3 PM started to sundown.  Does live alone and take care of medications  -CXR shows lower lobe infiltrates  -MRI, B12, folate, TSH normal  Plan  -Methylprednisolone 20 Mg oral daily completed 11/20, ceftriaxone/doxycycline 11/15-to be completed on 11/22 for total 7-day course  -Awaiting SNF pre-CERT  -Avoid hospital associated delirium.  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     #Congestive diastolic heart failure ejection fraction 45-50%  #Bilateral lower extremity edema secondary to above  #Atrial fibrillation  #Hypertension  #Hyperlipidemia  -11/14 echo shows EF 45-50%, moderate to severely elevated right ventricular systolic pressure  -Patient had a echocardiogram done from [facility] however records are not available within the computer.  I was able to review the records provided by the family members which showed ejection fraction 50 to 55% with dilated left and right atria and mild aortic valve stenosis.  Plan  - Cardiology have decided to stop digoxin has not needed.  Rate is controlled with diltiazem  -Continue SGLT2 inhibitor  -Continue, 120 Mg diltiazem, 25 mg losartan and 10 mg atorvastatin.  -oral Lasix 40 Mg daily     #COPD, 3 L at baseline  #Pulmonary hypertension  #History of DVT/PE (2016)  -At baseline patient is on 3 L nasal cannula  -Patient has recent diagnosis of pulmonary hypertension however did not have any formal work-up.  She does follow-up with a pulmonologist and was recommended to see a cardiologist however patient had not had the time to make appointment.  Plan  -Continue patient on 2 L home oxygen  -Supportive therapy; Incentive spirometry, home Spiriva inhaler, and,  DuoNebs as needed  -Continue Eliquis      #Constipation  -Over a week since last bowel movement  -Polyethylene glycol daily, docusate twice daily     #Multiple stable ulcerations on the toes secondary to attempted self-debridement  #Venous stasis dermatitis bilateral lower leg  #Right posterior heel pain  -Podiatry recs for antibiotic ointment, ammonium lactate to be applied to legs, and debrided jagged toenails.      #Hypomagnesemia  -Replete as necessary >2        #Depression  #GERD  #Incontinence  -Continue home sertraline 50 mg daily  -Continue home omeprazole 40 Mg  -Continue home oxybutynin 10mg every day        DVT prophylaxis: Home Eliquis  Diet: Easy to chew diet  Consults: Cardiology, neurology, PT/OT ([facility] 10/13), social work  Code: DNR/DNI  Dispo: Telemetry         Mirian Hernández MD  PGY1 Internal Medicine

## 2023-11-21 NOTE — PROGRESS NOTES
11/21/23 1329   Discharge Planning   Type of Post Acute Facility Services Skilled nursing   Patient expects to be discharged to: [facility]   Does the patient need discharge transport arranged? Yes   RoundTrip coordination needed? Yes     1329:  Therapy completed.  Precert team notified to submit precert.

## 2023-11-22 PROBLEM — R41.82 ALTERED MENTAL STATUS: Status: RESOLVED | Noted: 2023-01-01 | Resolved: 2023-01-01

## 2023-11-22 PROBLEM — R06.02 SHORTNESS OF BREATH: Status: RESOLVED | Noted: 2023-01-01 | Resolved: 2023-01-01

## 2023-11-22 PROBLEM — R41.0 DISORIENTATION: Status: RESOLVED | Noted: 2023-01-01 | Resolved: 2023-01-01

## 2023-11-22 NOTE — DISCHARGE SUMMARY
Discharge Diagnosis  Acute metabolic encephalopathy  Bilateral pneumonia  Bilateral lower extremity edema secondary to CHF EF 45 to 50%    Issues Requiring Follow-Up  Follow-up podiatry outpatient for painful nail    Discharge Meds     Your medication list        START taking these medications        Instructions Last Dose Given Next Dose Due   dapagliflozin propanediol 10 mg  Commonly known as: Farxiga  Start taking on: November 23, 2023      Take 1 tablet (10 mg) by mouth once daily. Do not start before November 23, 2023.       diclofenac sodium 1 % gel gel  Commonly known as: Voltaren      Apply 1 Application topically 4 times a day as needed (pain- mild 1-3, bilateral feet).              CHANGE how you take these medications        Instructions Last Dose Given Next Dose Due   furosemide 40 mg tablet  Commonly known as: Lasix  Start taking on: November 23, 2023  What changed:   medication strength  how much to take      Take 1 tablet (40 mg) by mouth once daily. Do not start before November 23, 2023.              CONTINUE taking these medications        Instructions Last Dose Given Next Dose Due   albuterol 90 mcg/actuation inhaler           atorvastatin 10 mg tablet  Commonly known as: Lipitor           cholecalciferol 50 mcg (2,000 unit) capsule  Commonly known as: Vitamin D-3           cyanocobalamin 500 mcg tablet  Commonly known as: Vitamin B-12           dilTIAZem  mg 24 hr capsule  Commonly known as: Tiazac  Start taking on: November 23, 2023      Take 1 capsule (120 mg) by mouth once daily. Do not start before November 23, 2023.       Eliquis 5 mg tablet  Generic drug: apixaban           fluticasone 50 mcg/actuation nasal spray  Commonly known as: Flonase           ipratropium-albuteroL 0.5-2.5 mg/3 mL nebulizer solution  Commonly known as: Duo-Neb           losartan 25 mg tablet  Commonly known as: Cozaar           omeprazole 40 mg DR capsule  Commonly known as: PriLOSEC           oxybutynin XL 10  mg 24 hr tablet  Commonly known as: Ditropan-XL           polyethylene glycol 17 gram packet  Commonly known as: Glycolax, Miralax           sertraline 50 mg tablet  Commonly known as: Zoloft           Stiolto Respimat 2.5-2.5 mcg/actuation mist inhaler  Generic drug: tiotropium-olodateroL                  STOP taking these medications      digoxin 125 MCG tablet  Commonly known as: Lanoxin        potassium chloride CR 20 mEq ER tablet  Commonly known as: Klor-Con M20                  Where to Get Your Medications        These medications were sent to [id number] - [place], [place] - [address] AT [facility]  [address], [facility] [address]      Phone: [phone]   dapagliflozin propanediol 10 mg  diclofenac sodium 1 % gel gel  dilTIAZem  mg 24 hr capsule  furosemide 40 mg tablet         Test Results Pending At Discharge  Pending Labs       Order Current Status    Extra Tubes Preliminary result    Lavender Top Preliminary result            Hospital Course   Zayda Grace is a 83-year-old female with a past medical history of prior PE, DVT, COPD-on 3 L oxygen, CHF EF 55%, A-fib, HTN, HLD, initially presented to hospital with altered mentation, poor oral intake, shortness of breath, and repeat episodes of falls along with progressive confusion.  When she came into the ED she was found to have an x-ray that showed lower lobe infiltrates concerning for a bilateral pneumonia.  MRI brain was negative.  She was treated with a 5-day course of methylprednisolone 20 mg once daily and a total 7-day course of Rocephin and doxycycline.  UTI was ruled out with a negative urinalysis.  Blood cultures were negative.  Cardiology team reviewed her due to extensive bilateral lower extremity edema and history of A-fib and CHF EF 45 to 50%.  Cardiology team decided to start her on an SGLT2 inhibitor and change her Lasix dose to 40 mg once daily.  They continue with diltiazem as this was  controlling the rate for A-fib.  She is to continue taking Eliquis for anticoagulation.  Her digoxin was discontinued as diltiazem was adequate to controlling her rate for A-fib.  She will go to [facility] for further rehab.  She is to follow-up with her podiatrist as an outpatient.    Pertinent Physical Exam At Time of Discharge  Physical Exam  General:  Pleasant and cooperative. No apparent distress.  HEENT:  Normocephalic, atraumatic  Chest:  Clear to auscultation bilaterally.   CV:  Regular rate and rhythm. No murmurs    Abdomen: Abdomen is soft, non-tender, non-distended. BS +   Extremities:  No lower extremity edema or cyanosis.   Neurological: No focal deficits.  Skin:  Warm and dry.    Outpatient Follow-Up  Follow-up podiatry appointment in 1 month      Mirian Hernández MD  PGY1 internal medicine

## 2023-11-22 NOTE — DISCHARGE INSTRUCTIONS
Thank you attending [facility]    1.  He will be attending [facility] for further physical and occupational rehab.  2.  We have discontinued your digoxin.  You will no longer need to take this medication and we will not prescribe it further.  3.  We changed your Lasix dose to 40 mg once daily.  4.  You have a new medication called Farxiga (Dapagliflozin).  Take 1 tablet 10 mg by mouth once daily.  This medication is for your heart failure.  5.  Continue taking your other medications.  6.  If symptoms recur, please attend your nearest ED immediately.  7.  Please follow-up with your podiatrist Dr. Anderson in outpatient clinic.  8.  Please follow-up with your PCP within 2 weeks

## 2023-11-22 NOTE — PROGRESS NOTES
TCC Note: Pt has written discharge to go to [facility] today. Insurance Auth was obtained. Messaged MD to please sign the [employer] Form so that we may set up transport. Waiting for signature on form. Angeles Casey, MSN, RN, TCC.

## 2023-11-22 NOTE — PROGRESS NOTES
11/22/23 1207   Discharge Planning   Type of Post Acute Facility Services Skilled nursing   Patient expects to be discharged to: [facility]   Does the patient need discharge transport arranged? Yes   RoundTrip coordination needed? Yes     1205:  Insurance auth obtained.   1526:  Will DC today at 5pm going to [facility].  TCC to notify son.

## 2023-11-22 NOTE — CARE PLAN
The patient's goals for the shift include no shortness of breath    The clinical goals for the shift include to remain safe duringh the shift    Over the shift, the patient did not make progress toward the following goals. Barriers to progression include Patient still sob with exertion. Recommendations to address these barriers include continue to monitor.

## 2023-11-22 NOTE — CARE PLAN
The patient's goals for the shift include no shortness of breath    The clinical goals for the shift include to remain safe duringh the shift

## 2023-11-24 PROBLEM — J18.9 PNEUMONIA DUE TO INFECTIOUS ORGANISM, UNSPECIFIED LATERALITY, UNSPECIFIED PART OF LUNG: Status: ACTIVE | Noted: 2023-01-01

## 2023-11-24 NOTE — PROGRESS NOTES
Pharmacy Medication History Review    Zayda Grace is a 83 y.o. female admitted for No Principal Problem: There is no principal problem currently on the Problem List. Please update the Problem List and refresh.. Pharmacy reviewed the patient's [id number] medications and allergies for accuracy.    The list below reflectives the updated PTA list. Please review each medication in order reconciliation for additional clarification and justification.  (Not in a hospital admission)       The list below reflectives the updated allergy list. Please review each documented allergy for additional clarification and justification.  Allergies  Reviewed by Stefany Gale CPhT on 11/24/2023        Severity Reactions Comments    Codeine High Hives, Shortness of breath     Etodolac Low Swelling     Hydromorphone Low Nausea And Vomiting     Lisinopril Low Cough     Tramadol Low Other Slurring words, unable to tolerate            Below are additional concerns with the patient's PTA list.    See PTA med list    Stefany Gale CPhT

## 2023-11-24 NOTE — PROGRESS NOTES
I received Zayda Grace in signout from Dr. Leslie.  Please see the previous note for all HPI, PE and MDM up to the time of signout at 1601.    In brief Zayda Grace is an 83 y.o. female presenting for   Chief Complaint   Patient presents with    Shortness of Breath   At the time of signout we were awaiting:  -CT PE    CT imaging reviewed.  No PE.  Concern for pneumonia.  Antibiotic coverage broadened to cover hospital-acquired pneumonia by previous provider.  Given new oxygen requirement, will escalate care to hospitalization for further management of pneumonia complicated by acute hypoxic respiratory failure.    ED Course as of 11/24/23 1919 Fri Nov 24, 2023   1536 EKG:  Rate 75  Atrial fibrillation  Left axis deviation  RBBB with   No ischemic changes  No changes compared to EKG obtained 11/13 [AG]   1643 On my evaluation, patient is nontoxic-appearing.  She is hemodynamically stable.  She is on 4 L nasal cannula. [AB]      ED Course User Index  [AB] Oensimo Verma MD  [AG] Romel Leslie MD         Diagnoses as of 11/24/23 1919   Pneumonia due to infectious organism, unspecified laterality, unspecified part of lung   Acute respiratory failure with hypoxia (CMS/HCC)       Pt Disposition: Admission

## 2023-11-24 NOTE — ED TRIAGE NOTES
Pt to ED for increasing shortness of breath since yesterday; was discharged from hospital 2 days ago for pneumonia.  At baseline, pt uses 4L O2 due to hx of COPD; per nursing home staff, pt required 6L to maintain normal O2 sat.  Pt O2 sat in ED is 94-95% on 4L

## 2023-11-24 NOTE — ED PROVIDER NOTES
External Records Reviewed: previous discharge summary  Independent Historians: daughter    ZO Grace is a 83 y.o. female with a history of DVT/PE, recent discharge for pneumonia and CHF exacerbation presenting to the emergency department for shortness of breath.  She was discharged from the hospital 2 days ago but since going back to her facility has been having increasing shortness of breath, cough.  She was working with physical therapy today and noted to have increasing oxygen requirement.  She was discharged on 3 L via nasal cannula and is now on 45 L via nasal cannula.  She denies any fevers.  She denies any chest pain.  She denies any abdominal pain.    [facility]  Past Medical History:   Diagnosis Date    Acute embolism and thrombosis of other specified deep vein of lower extremity, bilateral (CMS/HCC) 12/16/2016    Deep vein thrombosis (DVT) of other vein of both lower extremities    Personal history of pulmonary embolism 12/16/2016    History of pulmonary embolism       Meds  Current Outpatient Medications   Medication Instructions    albuterol 90 mcg/actuation inhaler 2 puffs, inhalation, Every 4 hours PRN    apixaban (Eliquis) 5 mg tablet 1 tablet, oral, 2 times daily    atorvastatin (LIPITOR) 10 mg, oral, Nightly    cholecalciferol (Vitamin D-3) 50 mcg (2,000 unit) capsule 1 tablet, oral, Daily    cyanocobalamin (VITAMIN B-12) 500 mcg, oral, Daily    dapagliflozin propanediol (FARXIGA) 10 mg, oral, Daily    diclofenac sodium (Voltaren) 1 % gel gel 1 Application, Topical, 4 times daily PRN    dilTIAZem ER (TIAZAC) 120 mg, oral, Daily    fluticasone (Flonase) 50 mcg/actuation nasal spray 2 sprays, nasal, Daily PRN    furosemide (LASIX) 40 mg, oral, Daily    ipratropium-albuteroL (Duo-Neb) 0.5-2.5 mg/3 mL nebulizer solution 3 mL, nebulization, Every 6 hours PRN    losartan (Cozaar) 25 mg tablet 0.5 tablets, oral, Daily    omeprazole (PriLOSEC) 40 mg DR capsule 1 capsule, oral, Daily    oxybutynin XL  "(DITROPAN-XL) 10 mg, oral, Daily    polyethylene glycol (GLYCOLAX, MIRALAX) 17 g, oral, Daily PRN    sertraline (ZOLOFT) 50 mg, oral, Daily    tiotropium-olodateroL (Stiolto Respimat) 2.5-2.5 mcg/actuation mist inhaler 2 puffs, inhalation, Daily       Allergies  Allergies   Allergen Reactions    Codeine Hives and Shortness of breath    Etodolac Swelling    Hydromorphone Nausea And Vomiting    Lisinopril Cough    Tramadol Other     Slurring words, unable to tolerate        SHx  Social History     Tobacco Use    Smoking status: Never     Passive exposure: Never    Smokeless tobacco: Never       ROS  Review of Systems   Constitutional:  Negative for chills and fever.   HENT:  Negative for ear pain and sore throat.    Eyes:  Negative for pain and visual disturbance.   Respiratory:  Positive for cough and shortness of breath.    Cardiovascular:  Negative for chest pain and palpitations.   Gastrointestinal:  Negative for abdominal pain and vomiting.   Genitourinary:  Negative for dysuria and hematuria.   Musculoskeletal:  Negative for arthralgias and back pain.   Skin:  Negative for color change and rash.   Neurological:  Negative for seizures and syncope.   All other systems reviewed and are negative.      ------------------------------------------------------------------------------------------------------------------------------------------    Pulse 77   Temp 37 °C (98.6 °F)   Resp 19   Ht 1.702 m (5' 7\")   Wt 72.6 kg (160 lb 0.9 oz)   LMP  (LMP Unknown)   SpO2 95%   BMI 25.07 kg/m²     Physical Exam  Vitals and nursing note reviewed.   Constitutional:       General: She is not in acute distress.     Appearance: Normal appearance.   HENT:      Head: Normocephalic and atraumatic.      Right Ear: External ear normal.      Left Ear: External ear normal.   Eyes:      Extraocular Movements: Extraocular movements intact.      Conjunctiva/sclera: Conjunctivae normal.   Cardiovascular:      Rate and Rhythm: Normal rate " and regular rhythm.      Pulses: Normal pulses.      Heart sounds: Normal heart sounds. No murmur heard.     No friction rub. No gallop.   Pulmonary:      Effort: Pulmonary effort is normal. Tachypnea present. No respiratory distress.      Breath sounds: Rales (diffuse) present. No wheezing or rhonchi.   Abdominal:      General: There is no distension.      Palpations: Abdomen is soft.      Tenderness: There is no abdominal tenderness. There is no guarding or rebound.   Musculoskeletal:         General: No swelling. Normal range of motion.      Cervical back: Neck supple.      Right lower leg: No edema.      Left lower leg: No edema.   Skin:     General: Skin is warm and dry.   Neurological:      General: No focal deficit present.      Mental Status: She is alert and oriented to person, place, and time.   Psychiatric:         Mood and Affect: Mood normal.         ------------------------------------------------------------------------------------------------------------------------------------------    Medical Decision Making: This is an 83-year-old female presenting to the emergency department with increasing shortness of breath.  Her vital signs are notable for increased oxygen requirement.  On examination she has coarse rales throughout.  At this time, I suspect that she has a treatment failure of her community-acquired pneumonia.  She was hospitalized for quite some time and received antibiotics while she was in the hospital.  Given this she will be started on broad-spectrum antibiotics with concern for resistant or hospital associated pneumonia.  Her chest x-ray today shows small right and small to moderate left pleural effusion with likely pneumonia in the left lung base.  There is also concern for pulmonary edema and heart failure.  Her BNP is very mildly elevated at 138.  Her electrolytes are within normal limits on her CMP.  Her CBC shows a mild, chronic anemia which is appears to be baseline.  She has no  significant leukocytosis.  Her VBG shows mild CO2 retention but a compensated pH.  Her troponins are stable at 28 and 26 I have low concern for acute coronary syndrome contributing to her symptoms.  While in the emergency department, she was given a DuoNeb and steroid in order to help with her shortness of breath.  Given her recurrent shortness of breath and her history of PE, we will obtain a CT PE to evaluate for pulmonary embolism as well as any mass or other complicating factors of her pneumonia.  This to be signed out to the oncoming provider but anticipate that she will require admission to the hospital for continued evaluation and management.      Independent Interpretations: EKG, CXR    EKG interpreted by me:  ED Course as of 11/25/23 1140   Fri Nov 24, 2023   1536 EKG:  Rate 75  Atrial fibrillation  Left axis deviation  RBBB with   No ischemic changes  No changes compared to EKG obtained 11/13 [AG]   1643 On my evaluation, patient is nontoxic-appearing.  She is hemodynamically stable.  She is on 4 L nasal cannula. [AB]      ED Course User Index  [AB] Onesimo Verma MD  [AG] Romel Leslie MD         Diagnoses as of 11/25/23 1140   Pneumonia due to infectious organism, unspecified laterality, unspecified part of lung   Acute respiratory failure with hypoxia (CMS/HCC)         Romel Leslie MD  Emergency Medicine Attending       Romel Leslie MD  11/26/23 3179

## 2023-11-25 NOTE — H&P
History Of Present Illness  Zayda Grace is a 83 y.o. female with past medical history PE, DVT, COPD on 3 L home oxygen, recent admission for confusion,, congestive heart failure with ejection fraction of 45 to 50%, atrial fibrillation on Eliquis, hypertension, hyperlipidemia presenting with chief complaint of shortness of breath.  Patient is a poor historian, history was supplemented by daughter at bedside.  Patient was apparently requiring up to 6 L to maintain her oxygen at approximately 95% at [facility], she was accordingly sent in for further management with concern for pneumonia.  Patient is complaining of productive cough, daughter states she coughed up some blood earlier today.    In the ED vital signs were temperature 98.6, heart 77, respiratory rate 19, blood pressure 103/53, oxygen saturation 99% on 4 L.  Labs show potassium 3.6, creatinine 0.82, , troponin 28, repeat 26, white blood cell count 7.4, hemoglobin 10.9, negative for flu and coronavirus.  Venous pH 7.4.  MRSA negative. Chest x-ray with small right and small to moderate left-sided pleural effusion with associated atelectasis.  CT angio was negative for central PE, there was left lower lobe consolidation and atelectasis.  There was nodular opacities within the right middle lobe measuring 1.5 times approximately 2 cm.  PET/CT or follow-up CT chest was recommended.  Patient was given azithromycin, Zosyn, vancomycin, prednisone in the ED as well as breathing treatments.  She will be admitted to the medicine service.     Past Medical History  Past Medical History:   Diagnosis Date    Acute embolism and thrombosis of other specified deep vein of lower extremity, bilateral (CMS/HCC) 12/16/2016    Deep vein thrombosis (DVT) of other vein of both lower extremities    Personal history of pulmonary embolism 12/16/2016    History of pulmonary embolism       Surgical History  Past Surgical History:   Procedure Laterality Date    BACK SURGERY  12/16/2016     Back Surgery    KNEE SURGERY  12/16/2016    Knee Surgery Left    MR HEAD ANGIO WO IV CONTRAST  7/8/2016    MR HEAD ANGIO WO IV CONTRAST 7/8/2016 [facility] INPATIENT LEGACY    MR NECK ANGIO WO IV CONTRAST  7/8/2016    MR NECK ANGIO WO IV CONTRAST 7/8/2016 [facility] INPATIENT LEGACY        Social History  Prior Smoker, ambulates with walker    Family History  HTN     Allergies  Codeine, Etodolac, Hydromorphone, Lisinopril, and Tramadol    Review of Systems   Constitutional:  Positive for fatigue. Negative for chills, diaphoresis and fever.   HENT:  Positive for hearing loss. Negative for mouth sores.    Eyes:  Negative for discharge and itching.   Respiratory:  Positive for cough, chest tightness and shortness of breath.    Cardiovascular:  Negative for chest pain, palpitations and leg swelling.   Gastrointestinal:  Negative for abdominal distention and abdominal pain.   Endocrine: Negative for cold intolerance and heat intolerance.   Genitourinary:  Negative for dysuria and enuresis.   Musculoskeletal:  Positive for arthralgias and back pain.   Allergic/Immunologic: Negative for environmental allergies and food allergies.   Neurological:  Negative for dizziness and facial asymmetry.   Hematological:  Negative for adenopathy. Does not bruise/bleed easily.   Psychiatric/Behavioral:  Negative for agitation and behavioral problems.         Physical Exam  Constitutional:       Appearance: She is not toxic-appearing or diaphoretic.   HENT:      Head: Normocephalic and atraumatic.      Nose: Congestion present.      Mouth/Throat:      Mouth: Mucous membranes are dry.   Eyes:      Extraocular Movements: Extraocular movements intact.      Pupils: Pupils are equal, round, and reactive to light.   Cardiovascular:      Rate and Rhythm: Normal rate. Rhythm irregular.   Pulmonary:      Effort: Respiratory distress present.      Breath sounds: Wheezing present.   Abdominal:      General: There is no distension.      Palpations: Abdomen is  "soft. There is no mass.   Musculoskeletal:      Cervical back: Normal range of motion and neck supple.      Right lower leg: Edema present.      Left lower leg: Edema present.   Skin:     General: Skin is warm and dry.   Neurological:      General: No focal deficit present.      Mental Status: She is alert.      Cranial Nerves: No cranial nerve deficit.   Psychiatric:         Mood and Affect: Mood normal.         Behavior: Behavior normal.          Last Recorded Vitals  Blood pressure 92/55, pulse 77, temperature 37 °C (98.6 °F), resp. rate 20, height 1.702 m (5' 7\"), weight 72.6 kg (160 lb 0.9 oz), SpO2 95 %.    Relevant Results  CT angio chest for pulmonary embolism    Result Date: 11/24/2023  Interpreted By:  Steve Yousif, STUDY: CT ANGIO CHEST FOR PULMONARY EMBOLISM;  11/24/2023 5:29 pm   INDICATION: Signs/Symptoms:eval SOB.   COMPARISON: 11/20/2017, renal ultrasound 11/13/2023   ACCESSION NUMBER(S): [id number]   ORDERING CLINICIAN: GERONIMO YORK   TECHNIQUE: Helical data acquisition of the chest was obtained after the administration of intravenous contrast, 75 mL Omnipaque 350. Images were reformatted in axial, coronal, and sagittal planes.  MIP reconstructions were performed on a separate workstation and provided for interpretation.   FINDINGS: LOWER NECK AND SUPERFICIAL SOFT TISSUES:  Moderate left-greater-than-right body wall edema.   MEDIASTINUM/YUE: No lymphadenopathy. Esophagus is unremarkable.   CARDIOVASCULAR: Global cardiomegaly. No pericardial effusion.  Aortic caliber normal. Normal caliber of main pulmonary artery.  Multivessel coronary atherosclerotic calcification.   UPPER ABDOMEN: Cholecystectomy. Nonobstructing bilateral renal calculi measuring up to 0.6 cm. 1.5 cm hyperdense right upper pole renal lesion measuring 1.6 cm, previously measuring 1.2 cm on 11/20/2017. Given minimal interval change, and appearance on the 11/13/2023 ultrasound, this likely reflects a hyperdense renal cyst. " Colonic diverticulosis. There is an ill-defined 2.3 cm left adrenal nodule, not substantially changed in size from 11/20/2017. There is borderline aneurysmal dilation of the abdominal aorta at the level of the renal arteries measuring 3.2 cm, unchanged from 11/20/2017.   LUNGS, AIRWAYS, AND PLEURA: Small right-greater-than-left pleural effusions. Is dependent/compressive left lower lobe atelectasis/consolidation. Moderate to severe emphysema. Mild dependent right lower lobe atelectasis. Linear-nodular opacities within the right middle lobe measuring 1.4 x 1.9 cm (axial image 179/264). 0.7 cm subpleural nodule within the lingula (axial image 147/264). Additional regions of subsegmental atelectasis/scarring.   MUSCULOSKELETAL: Moderate multilevel spinal degenerative change. Increasing cortication of the destructive changes of the left sternoclavicular joint when compared to 11/20/2017. 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 There is thoracic dextroscoliosis.       1. No central pulmonary embolism. Limited evaluation of subsegmental pulmonary arteries due to suboptimal opacification and mild motion artifact. 2. Left lower lobe consolidation/atelectasis. Consider pneumonia. Small right-greater-than-left pleural effusions. 3. Linear-nodular opacities within the right middle lobe measuring 1.4 x 1.9 cm (axial image 179/264); appearance is indeterminate, though suggestive scarring/subsegmental atelectasis. PET-CT or three-month follow-up chest CT may be considered to exclude a malignant process. 4. Additional chronic findings as above.     Signed by: Steve Yousif 11/24/2023 6:30 PM Dictation workstation:   [id number]    XR chest 2 views    Result Date: 11/24/2023  Interpreted By:  Eduardo Munson, STUDY: Chest dated  11/24/2023.   INDICATION: Signs/Symptoms:SOB   COMPARISON: Chest dated 11/15/2020.   ACCESSION NUMBER(S): [id number]   ORDERING CLINICIAN: GERONIMO YORK   TECHNIQUE: Two views of the  chest.   FINDINGS: There is minimal blunting of the right costophrenic angle. There is mild-to-moderate blunting of the left costophrenic angle. There associated ill-defined bibasilar opacities. There is diffuse prominence of the pulmonary interstitium and mine.  No pneumothorax is evident. The cardiomediastinal silhouette is  enlarged but similar to the prior exam.Degenerative change is seen of the spine and shoulders.       1. Small right and small to moderate left pleural effusion with associated atelectasis and/or pneumonitis. 2. Findings suggestive of a concomitant degree of pulmonary edema/CHF.   MACRO: None   Signed by: Eduardo Munson 11/24/2023 4:00 PM Dictation workstation:   [id number]    XR chest 1 view    Result Date: 11/15/2023  STUDY: Chest Radiograph;  11/15/2023 11:23 AM. INDICATION: Hypoxia. COMPARISON: Chest, single portable view obtained on 11/13/2023 at 11:35 hours (two images obtained). ACCESSION NUMBER(S): [id number] ORDERING CLINICIAN: TACOS BRADFORD TECHNIQUE:  Frontal chest was obtained at 11:11 hours. FINDINGS: CARDIOMEDIASTINAL SILHOUETTE: The heart is enlarged.  LUNGS: Multifocal patchy regions of airspace opacity both lungs.  Small pleural effusions.  Patient rotation towards the left.  ABDOMEN: No remarkable upper abdominal findings.  BONES: Scoliosis thoracic spine.    Multifocal patchy pulmonary infiltrates. Small pleural effusions. Cardiomegaly. Signed by Clive Rudolph MD    MR brain wo IV contrast    Result Date: 11/14/2023  Interpreted By:  Joseph Mays, STUDY: MR BRAIN WO IV CONTRAST;  11/14/2023 6:57 pm   INDICATION: Signs/Symptoms:Progressive decline over the past month, recurrent falls.   COMPARISON: Head CT, 11/13/2023 and brain MRI, 07/08/2016   ACCESSION NUMBER(S): [id number]   ORDERING CLINICIAN: DELMY HUNT   TECHNIQUE: Axial T2, FLAIR, DWI, gradient echo T2 and sagittal and coronal T1 weighted images of the brain were acquired.  Image quality is degraded by  motion.   FINDINGS: CSF Spaces: The ventricles, sulci and basal cisterns are appropriate for the degree of volume loss. No abnormal extra-axial collection.   Parenchyma: There is no restricted diffusion to suggest acute infarction.  Parenchymal volume loss most pronounced in the parietal lobes, unchanged from the most recent comparison but mildly progressed from 2016. Nonspecific T2 hyperintense signal in the white matter is likely secondary to chronic small vessel ischemic disease. No evidence of intracranial hemorrhage. There is no mass effect or midline shift.   Paranasal Sinuses and Mastoids: Scattered paranasal sinus mucosal thickening. The mastoid air cells are clear.       Mildly motion limited exam, no acute intracranial pathology.   MACRO: None   Signed by: Joseph Mays 11/14/2023 7:15 PM Dictation workstation:   [id number]    Transthoracic Echo (TTE) Complete    Result Date: 11/14/2023    [facility], [address] [place] [id number] [phone] and                                 Fax [phone] TRANSTHORACIC ECHOCARDIOGRAM REPORT  Patient Name:      BAM NERI    Reading Physician:    [id number] Killian Carolina MD Study Date:        11/14/2023           Ordering Provider:    [id number] TERI MCLAIN MRN/PID:           [id number]             Fellow: Accession#:        [id number]         Nurse: Date of Birth/Age: 1940 / 83 years Sonographer:          Sinan Benavides RDCS Gender:            F                    Additional Staff: Height:            154.94 cm            Admit Date:           11/13/2023 Weight:            72.58 kg             Admission Status:     Emergency BSA:               1.72 m2              Encounter#:           [id number]                                         Department Location:  [facility] Blood Pressure: 129 /63 mmHg Study Type:     TRANSTHORACIC ECHO (TTE) COMPLETE Diagnosis/ICD: Chronic systolic (congestive) heart failure (CHF)-I50.22 CPT Code:      Echo Complete w Full Doppler-57799; Myocardial Strain                Imaging-[id number] Patient History: Pertinent History: A-Fib, Cardiomyopathy, CHF, Previous DVT, Hyperlipidemia,                    COPD and Dyspnea. bacteremia. Study Detail: The following Echo studies were performed: 2D, M-Mode, Doppler,               color flow and Strain. Technically challenging study due to body               habitus and the patient's lack of cooperation.  PHYSICIAN INTERPRETATION: Left Ventricle: The left ventricular systolic function is mildly decreased, with an estimated ejection fraction of 45-50%. There is global hypokinesis of the left ventricle with minor regional variations. The left ventricular cavity size is normal. Left Ventricular Global Longitudinal Strain - 15.9 %. Spectral Doppler shows an impaired relaxation pattern of left ventricular diastolic filling. Left Atrium: The left atrium is severely dilated. Right Ventricle: The right ventricle is mildly enlarged. There is mildly reduced right ventricular systolic function. Right Atrium: The right atrium is mildly dilated. Aortic Valve: The aortic valve is probably trileaflet. There is evidence of mild aortic valve stenosis. There is no evidence of aortic valve regurgitation. The peak instantaneous gradient of the aortic valve is 19.2 mmHg. The mean gradient of the aortic valve is 10.0 mmHg. Mitral Valve: The mitral valve is mildly thickened. There is mild to moderate mitral valve regurgitation. Tricuspid Valve: The tricuspid valve is abnormal. There is moderate tricuspid regurgitation. The Doppler estimated RVSP is moderate to severely elevated at 45.0 mmHg. Pulmonic Valve: The pulmonic valve is structurally normal. There is trace pulmonic valve regurgitation. Pericardium: There is no pericardial effusion noted. Aorta: The aortic root is normal.  There is mild dilatation of the ascending aorta. There is mild dilatation of the aortic root. Systemic Veins: The inferior vena cava appears dilated.  CONCLUSIONS:  1. Left ventricular systolic function is mildly decreased with a 45-50% estimated ejection fraction.  2. Spectral Doppler shows an impaired relaxation pattern of left ventricular diastolic filling.  3. There is mildly reduced right ventricular systolic function.  4. The left atrium is severely dilated.  5. Mild to moderate mitral valve regurgitation.  6. The tricuspid valve is abnormal.  7. Moderate to severely elevated right ventricular systolic pressure.  8. Moderate tricuspid regurgitation visualized.  9. Mild aortic valve stenosis. 10. There is global hypokinesis of the left ventricle with minor regional variations. QUANTITATIVE DATA SUMMARY: 2D MEASUREMENTS:                          Normal Ranges: LAs:           5.00 cm   (2.7-4.0cm) IVSd:          1.01 cm   (0.6-1.1cm) LVPWd:         0.95 cm   (0.6-1.1cm) LVIDd:         4.63 cm   (3.9-5.9cm) LVIDs:         3.47 cm LV Mass Index: 90.9 g/m2 LV % FS        25.1 % LA VOLUME:                             Normal Ranges: LA Volume Index: 62.0 ml/m2 RA VOLUME BY A/L METHOD:                       Normal Ranges: RA Area A4C: 28.0 cm2 M-MODE MEASUREMENTS:                  Normal Ranges: Ao Root: 3.80 cm (2.0-3.7cm) LV SYSTOLIC FUNCTION BY 2D PLANIMETRY (MOD):                                          Normal Ranges: EF-A4C View:                      52.4 % (>=55%) EF-A2C View:                      48.3 % EF-Biplane:                       50.7 % Global Longitudinal Strain (GLS): 15.9 % LV DIASTOLIC FUNCTION:                     Normal Ranges: MV Peak E: 0.99 m/s (0.7-1.2 m/s) MV Peak A: 0.37 m/s (0.42-0.7 m/s) E/A Ratio: 2.69     (1.0-2.2) MITRAL VALVE:                 Normal Ranges: MV DT: 185 msec (150-240msec) AORTIC VALVE:                                    Normal Ranges: AoV Vmax:                2.19 m/s   (<=1.7m/s) AoV Peak P.2 mmHg (<20mmHg) AoV Mean PG:             10.0 mmHg (1.7-11.5mmHg) LVOT Max Linwood:            1.11 m/s  (<=1.1m/s) AoV VTI:                 43.10 cm  (18-25cm) LVOT VTI:                21.50 cm LVOT Diameter:           2.10 cm   (1.8-2.4cm) AoV Area, VTI:           1.73 cm2  (2.5-5.5cm2) AoV Area,Vmax:           1.76 cm2  (2.5-4.5cm2) AoV Dimensionless Index: 0.50  RIGHT VENTRICLE: RV Basal 4.66 cm RV Mid   3.04 cm RV Major 8.5 cm TAPSE:   13.4 mm TRICUSPID VALVE/RVSP:                             Normal Ranges: Peak TR Velocity: 3.24 m/s RV Syst Pressure: 45.0 mmHg (< 30mmHg) PULMONIC VALVE:                      Normal Ranges: PV Max Linwood: 1.3 m/s  (0.6-0.9m/s) PV Max P.3 mmHg  [id number] Killian Carolina MD Electronically signed on 2023 at 1:57:36 PM  ** Final **     US renal complete    Result Date: 2023  INDICATION:  Hydronephrosis. TECHNIQUE:  Ultrasound of the kidneys and bladder. COMPARISON:  None available FINDINGS: The right kidney measures 9.8 cm in length.  Renal cortical thinning is present.  There is no hydronephrosis.  There is a single stone located within the lower pole measuring 0.5 cm.  There are multiple renal cysts present with the largest located within the midportion and measuring 1.2 x 1.1 x 1.3 cm. The left kidney measures 8.2 cm in length.  Mild renal cortical thinning is present.  There is no hydronephrosis.  There are no stones.  There is a simple cyst within the midportion measuring 1.0 x 0.9 x 1.0 cm. The bladder is partially distended.  The bilateral ureteral jets are not visualized.     Nonobstructing right calculus measuring 0.5 cm. Bilateral renal cortical thinning.  No hydronephrosis bilaterally. Bilateral simple cysts. Signed by Hiro Johnson MD    CT head wo IV contrast    Result Date: 2023  Interpreted By:  Tommy Benites, STUDY: CT HEAD WO IV CONTRAST;  2023 1:00 pm   INDICATION: confusion.   COMPARISON: None.    ACCESSION NUMBER(S): [id number]   ORDERING CLINICIAN: TERI MCLAIN   TECHNIQUE: Noncontrast axial CT scan of head was performed. Angled reformats in brain and bone windows were generated. The images were reviewed in bone, brain, blood and soft tissue windows.   FINDINGS: CSF Spaces: The ventricles, sulci and basal cisterns are within normal limits. There is no extraaxial fluid collection.   Parenchyma: Significant supratentorial atrophy. The grey-white differentiation is intact. There is no mass effect or midline shift. There is no intracranial hemorrhage.   Calvarium: No acute displaced calvarial fracture.   Paranasal sinuses and mastoids: Visualized paranasal sinuses and mastoids are clear.       No CT evidence of acute intracranial pathology.       MACRO: None       Signed by: Tommy Benites 11/13/2023 1:44 PM Dictation workstation:   [id number]    XR chest 1 view    Result Date: 11/13/2023  Interpreted By:  Tommy Benites, STUDY: XR CHEST 1 VIEW;  11/13/2023 11:43 am   INDICATION: Signs/Symptoms:sob.   COMPARISON: Chest radiograph 11/21/2017   ACCESSION NUMBER(S): [id number]   ORDERING CLINICIAN: TERI MCLAIN   FINDINGS:     CARDIOMEDIASTINAL SILHOUETTE: Cardiomediastinal silhouette is stable in size and configuration.   LUNGS: No consolidation, pneumothorax, or significant effusion. Mild bilateral diffuse reticular changes, favored to be chronic   ABDOMEN: No remarkable upper abdominal findings.   BONES: No acute osseous changes.       1.  No evidence of acute cardiopulmonary process.     Signed by: Tommy Benites 11/13/2023 12:00 PM Dictation workstation:   [id number]           Assessment/Plan   Principal Problem:    Pneumonia due to infectious organism, unspecified laterality, unspecified part of lung      #Acute on chronic hypoxic respiratory failure  #HAP  #Possible COPD exacerbation  #Bilateral pleural effusion  #Sepsis  #Hypotension  #History of DVT  #History of PE  #small sacral  wound  -Zosyn/azithromycin 11/24-  -Legionella and strep pneumo urine antigens, MRSA negative  -Continuous pulse oximetry  -Monitor on telemetry  -Continue home Eliquis  -NPO until swallow passed  -follow procal and blood and urine cultures  -PRN and scheduled duonebs  -scheduled mucinex and solumedrol  -hold home lasix given soft BP  -wound care consult placed    Chronic conditions:    #Hypertension  #Hyperlipidemia  #Heart failure with preserved ejection fraction  #GERD  #Urinary incontinence  #Hx COPD  -Continue home Cardizem, losartan with hold parameters, oxybutynin, pantoprazole, sertraline, atorvastatin  -continue home CPAP  -PT/OT/Speech/SW consults       FULL CODE per Patient       Denilson Mobley DO

## 2023-11-25 NOTE — PROGRESS NOTES
Zayda Grace is a 83 y.o. female on day 1 of admission presenting with Pneumonia due to infectious organism, unspecified laterality, unspecified part of lung.      Subjective   Patient seen and examined this am. No acute events over night. The patient states she is still feeling short of breath.        Objective     Last Recorded Vitals  /60   Pulse 64   Temp 37 °C (98.6 °F) (Temporal)   Resp 20   Wt 72.6 kg (160 lb 0.9 oz)   SpO2 95%   Intake/Output last 3 Shifts:    Intake/Output Summary (Last 24 hours) at 11/25/2023 1254  Last data filed at 11/25/2023 0505  Gross per 24 hour   Intake 200 ml   Output 1500 ml   Net -1300 ml       Admission Weight  Weight: 72.6 kg (160 lb 0.9 oz) (11/24/23 1434)    Daily Weight  11/24/23 : 72.6 kg (160 lb 0.9 oz)    Image Results  CT angio chest for pulmonary embolism  Narrative: Interpreted By:  Steve Yousif,   STUDY:  CT ANGIO CHEST FOR PULMONARY EMBOLISM;  11/24/2023 5:29 pm      INDICATION:  Signs/Symptoms:eval SOB.      COMPARISON:  11/20/2017, renal ultrasound 11/13/2023      ACCESSION NUMBER(S):  [id number]      ORDERING CLINICIAN:  GERONIMO YORK      TECHNIQUE:  Helical data acquisition of the chest was obtained after the  administration of intravenous contrast, 75 mL Omnipaque 350. Images  were reformatted in axial, coronal, and sagittal planes.  MIP  reconstructions were performed on a separate workstation and provided  for interpretation.      FINDINGS:  LOWER NECK AND SUPERFICIAL SOFT TISSUES:  Moderate  left-greater-than-right body wall edema.      MEDIASTINUM/YUE: No lymphadenopathy. Esophagus is unremarkable.      CARDIOVASCULAR: Global cardiomegaly. No pericardial effusion.  Aortic  caliber normal. Normal caliber of main pulmonary artery.  Multivessel  coronary atherosclerotic calcification.      UPPER ABDOMEN: Cholecystectomy. Nonobstructing bilateral renal  calculi measuring up to 0.6 cm.  1.5 cm hyperdense right upper pole renal lesion  measuring 1.6 cm,  previously measuring 1.2 cm on 11/20/2017. Given minimal interval  change, and appearance on the 11/13/2023 ultrasound, this likely  reflects a hyperdense renal cyst. Colonic diverticulosis. There is an  ill-defined 2.3 cm left adrenal nodule, not substantially changed in  size from 11/20/2017. There is borderline aneurysmal dilation of the  abdominal aorta at the level of the renal arteries measuring 3.2 cm,  unchanged from 11/20/2017.      LUNGS, AIRWAYS, AND PLEURA: Small right-greater-than-left pleural  effusions. Is dependent/compressive left lower lobe  atelectasis/consolidation. Moderate to severe emphysema. Mild  dependent right lower lobe atelectasis. Linear-nodular opacities  within the right middle lobe measuring 1.4 x 1.9 cm (axial image  179/264). 0.7 cm subpleural nodule within the lingula (axial image  147/264). Additional regions of subsegmental atelectasis/scarring.      MUSCULOSKELETAL: Moderate multilevel spinal degenerative change.  Increasing cortication of the destructive changes of the left  sternoclavicular joint when compared to 11/20/2017. 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 There is thoracic dextroscoliosis.      Impression: 1. No central pulmonary embolism. Limited evaluation of subsegmental  pulmonary arteries due to suboptimal opacification and mild motion  artifact.  2. Left lower lobe consolidation/atelectasis. Consider pneumonia.  Small right-greater-than-left pleural effusions.  3. Linear-nodular opacities within the right middle lobe measuring  1.4 x 1.9 cm (axial image 179/264); appearance is indeterminate,  though suggestive scarring/subsegmental atelectasis. PET-CT or  three-month follow-up chest CT may be considered to exclude a  malignant process.  4. Additional chronic findings as above.          Signed by: Steve Yousif 11/24/2023 6:30 PM  Dictation workstation:   [id number]  XR chest 2 views  Narrative: Interpreted By:  Arpit  Eduardo,   STUDY:  Chest dated  11/24/2023.      INDICATION:  Signs/Symptoms:SOB      COMPARISON:  Chest dated 11/15/2020.      ACCESSION NUMBER(S):  [id number]      ORDERING CLINICIAN:  GERONIMO YORK      TECHNIQUE:  Two views of the chest.      FINDINGS:  There is minimal blunting of the right costophrenic angle. There is  mild-to-moderate blunting of the left costophrenic angle. There  associated ill-defined bibasilar opacities. There is diffuse  prominence of the pulmonary interstitium and mine.  No pneumothorax  is evident. The cardiomediastinal silhouette is  enlarged but similar  to the prior exam.Degenerative change is seen of the spine and  shoulders.      Impression: 1. Small right and small to moderate left pleural effusion with  associated atelectasis and/or pneumonitis.  2. Findings suggestive of a concomitant degree of pulmonary edema/CHF.      MACRO:  None      Signed by: Eduardo Munson 11/24/2023 4:00 PM  Dictation workstation:   [id number]      Physical Exam  Physical Exam:   General appearance: mild distress  HEENT: Atraumatic/normocephalic, moist mucosa  Neck: supple without obvious goiter, JVD not appreciated  Respiratory: decreased air movement, increased respiratory effort, crackles bl   Cardiovascular: regular rate, regular rhythm, no peripheral edema  Abdomen: no organomegaly, no tenderness to palpation in all quadrants  Extremities: strong peripheral pulses, no grossly obvious deformities   Skin: intact, no rashes   Neurologic: Alert and oriented x 3, No obvious focal deficit  Psych: appropriate mood & affect, cooperative    Relevant Results               Assessment/Plan                  Principal Problem:    Pneumonia due to infectious organism, unspecified laterality, unspecified part of lung    #Acute on chronic hypoxic respiratory failure  #HAP  #Possible COPD exacerbation  #Bilateral pleural effusion  #Sepsis  #Hypotension  #History of DVT  #History of PE  #small sacral  wound  -Zosyn/azithromycin 11/24-  -Legionella and strep pneumo urine antigens, MRSA negative  -Continuous pulse oximetry  -Monitor on telemetry  -Continue home Eliquis  -NPO until swallow passed  -follow procal and blood and urine cultures  -PRN and scheduled duonebs  -scheduled mucinex and solumedrol  -hold home lasix given soft BP  -wound care consult placed  -Broch hygiene      Chronic conditions:     #Hypertension  #Hyperlipidemia  #Heart failure with preserved ejection fraction  #GERD  #Urinary incontinence  #Hx COPD  -Continue home Cardizem, losartan with hold parameters, oxybutynin, pantoprazole, sertraline, atorvastatin  -continue home CPAP  -PT/OT/Speech/SW consults     FULL CODE               Miller Zapata, DO

## 2023-11-26 NOTE — PROGRESS NOTES
Zayda Grace is a 83 y.o. female on day 2 of admission presenting with Pneumonia due to infectious organism, unspecified laterality, unspecified part of lung.      Subjective   Patient seen and examined this a.m.  No acute events overnight.  Patient is currently on 4 L nasal cannula which is her baseline.  The patient states that she is feeling better this morning.  Patient will need continued antibiotics for pneumonia as well as aggressive bronchopulmonary hygiene.       Objective     Last Recorded Vitals  /70 (Patient Position: Sitting)   Pulse 69   Temp 36.3 °C (97.3 °F) (Temporal)   Resp 20   Wt 72.6 kg (160 lb 0.9 oz)   SpO2 97%   Intake/Output last 3 Shifts:    Intake/Output Summary (Last 24 hours) at 11/26/2023 1304  Last data filed at 11/26/2023 1050  Gross per 24 hour   Intake 1260 ml   Output 2875 ml   Net -1615 ml       Admission Weight  Weight: 72.6 kg (160 lb 0.9 oz) (11/24/23 1434)    Daily Weight  11/24/23 : 72.6 kg (160 lb 0.9 oz)    Image Results  CT angio chest for pulmonary embolism  Narrative: Interpreted By:  Steve Yousif,   STUDY:  CT ANGIO CHEST FOR PULMONARY EMBOLISM;  11/24/2023 5:29 pm      INDICATION:  Signs/Symptoms:eval SOB.      COMPARISON:  11/20/2017, renal ultrasound 11/13/2023      ACCESSION NUMBER(S):  [id number]      ORDERING CLINICIAN:  GERONIMO YORK      TECHNIQUE:  Helical data acquisition of the chest was obtained after the  administration of intravenous contrast, 75 mL Omnipaque 350. Images  were reformatted in axial, coronal, and sagittal planes.  MIP  reconstructions were performed on a separate workstation and provided  for interpretation.      FINDINGS:  LOWER NECK AND SUPERFICIAL SOFT TISSUES:  Moderate  left-greater-than-right body wall edema.      MEDIASTINUM/YUE: No lymphadenopathy. Esophagus is unremarkable.      CARDIOVASCULAR: Global cardiomegaly. No pericardial effusion.  Aortic  caliber normal. Normal caliber of main pulmonary artery.   Multivessel  coronary atherosclerotic calcification.      UPPER ABDOMEN: Cholecystectomy. Nonobstructing bilateral renal  calculi measuring up to 0.6 cm.  1.5 cm hyperdense right upper pole renal lesion measuring 1.6 cm,  previously measuring 1.2 cm on 11/20/2017. Given minimal interval  change, and appearance on the 11/13/2023 ultrasound, this likely  reflects a hyperdense renal cyst. Colonic diverticulosis. There is an  ill-defined 2.3 cm left adrenal nodule, not substantially changed in  size from 11/20/2017. There is borderline aneurysmal dilation of the  abdominal aorta at the level of the renal arteries measuring 3.2 cm,  unchanged from 11/20/2017.      LUNGS, AIRWAYS, AND PLEURA: Small right-greater-than-left pleural  effusions. Is dependent/compressive left lower lobe  atelectasis/consolidation. Moderate to severe emphysema. Mild  dependent right lower lobe atelectasis. Linear-nodular opacities  within the right middle lobe measuring 1.4 x 1.9 cm (axial image  179/264). 0.7 cm subpleural nodule within the lingula (axial image  147/264). Additional regions of subsegmental atelectasis/scarring.      MUSCULOSKELETAL: Moderate multilevel spinal degenerative change.  Increasing cortication of the destructive changes of the left  sternoclavicular joint when compared to 11/20/2017. 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 There is thoracic dextroscoliosis.      Impression: 1. No central pulmonary embolism. Limited evaluation of subsegmental  pulmonary arteries due to suboptimal opacification and mild motion  artifact.  2. Left lower lobe consolidation/atelectasis. Consider pneumonia.  Small right-greater-than-left pleural effusions.  3. Linear-nodular opacities within the right middle lobe measuring  1.4 x 1.9 cm (axial image 179/264); appearance is indeterminate,  though suggestive scarring/subsegmental atelectasis. PET-CT or  three-month follow-up chest CT may be considered to exclude a  malignant  process.  4. Additional chronic findings as above.          Signed by: Steve Yousif 11/24/2023 6:30 PM  Dictation workstation:   [id number]  XR chest 2 views  Narrative: Interpreted By:  Eduardo Munson,   STUDY:  Chest dated  11/24/2023.      INDICATION:  Signs/Symptoms:SOB      COMPARISON:  Chest dated 11/15/2020.      ACCESSION NUMBER(S):  [id number]      ORDERING CLINICIAN:  GERONIMO YORK      TECHNIQUE:  Two views of the chest.      FINDINGS:  There is minimal blunting of the right costophrenic angle. There is  mild-to-moderate blunting of the left costophrenic angle. There  associated ill-defined bibasilar opacities. There is diffuse  prominence of the pulmonary interstitium and mine.  No pneumothorax  is evident. The cardiomediastinal silhouette is  enlarged but similar  to the prior exam.Degenerative change is seen of the spine and  shoulders.      Impression: 1. Small right and small to moderate left pleural effusion with  associated atelectasis and/or pneumonitis.  2. Findings suggestive of a concomitant degree of pulmonary edema/CHF.      MACRO:  None      Signed by: Eduardo Munson 11/24/2023 4:00 PM  Dictation workstation:   [id number]      Physical Exam  Physical Exam:   General appearance: mild distress  HEENT: Atraumatic/normocephalic, moist mucosa  Neck: supple without obvious goiter, JVD not appreciated  Respiratory: decreased air movement, increased respiratory effort, crackles bl   Cardiovascular: regular rate, regular rhythm, no peripheral edema  Abdomen: no organomegaly, no tenderness to palpation in all quadrants  Extremities: strong peripheral pulses, no grossly obvious deformities   Skin: intact, no rashes   Neurologic: Alert and oriented x 3, No obvious focal deficit  Psych: appropriate mood & affect, cooperative     Relevant Results               Assessment/Plan      #Acute on chronic hypoxic respiratory failure  #HAP  #Possible COPD exacerbation  #Bilateral pleural  effusion  #Sepsis  #Hypotension  #History of DVT  #History of PE  #small sacral wound  -Zosyn/azithromycin 11/24-  -Legionella and strep pneumo urine antigens, MRSA negative  -Continuous pulse oximetry  -Monitor on telemetry  -Continue home Eliquis  -NPO until swallow passed  -follow procal and blood and urine cultures  -PRN and scheduled duonebs  -scheduled mucinex and solumedrol  -hold home lasix given soft BP  -wound care consult placed  -Broch hygiene      Chronic conditions:     #Hypertension  #Hyperlipidemia  #Heart failure with preserved ejection fraction  #GERD  #Urinary incontinence  #Hx COPD  -Continue home Cardizem, losartan with hold parameters, oxybutynin, pantoprazole, sertraline, atorvastatin  -continue home CPAP  -PT/OT/Speech/SW consults     FULL CODE             Principal Problem:    Pneumonia due to infectious organism, unspecified laterality, unspecified part of lung                  Miller Zapata,

## 2023-11-26 NOTE — CARE PLAN
The patient's goals for the shift include      The clinical goals for the shift include patient will maintain spo2 >92%

## 2023-11-27 NOTE — PROGRESS NOTES
Physical Therapy    Physical Therapy Evaluation    Patient Name: Zayda Grace  MRN: [id number]  Today's Date: 11/27/2023   Time Calculation  Start Time: 1006  Stop Time: 1030  Time Calculation (min): 24 min    Assessment/Plan   PT Assessment  PT Assessment Results: Decreased strength, Decreased endurance, Impaired balance, Decreased mobility  End of Session Communication: Bedside nurse  End of Session Patient Position: Up in chair, Alarm on (All needs in reach and no complaints noted.)  IP OR SWING BED PT PLAN  Inpatient or Swing Bed: Inpatient  PT Plan  Treatment/Interventions: Bed mobility, Transfer training, Gait training  PT Plan: Skilled PT  PT Frequency: 3 times per week  PT Discharge Recommendations: Moderate intensity level of continued care  PT - OK to Discharge: Yes (to next level of care once cleared by medical team)    Subjective     Current Problem:  Patient Active Problem List   Diagnosis    Atrial fibrillation (CMS/HCC)    Bacteremia due to methicillin resistant Staphylococcus aureus    Benign essential HTN    Cardiomyopathy (CMS/HCC)    DVT, bilateral lower limbs (CMS/HCC)    Gastroesophageal reflux disease without esophagitis    Hyperlipidemia    Osteoporosis    Severe chronic obstructive pulmonary disease (CMS/HCC)    Systolic heart failure (CMS/HCC)    Pneumonia due to infectious organism, unspecified laterality, unspecified part of lung       General Visit Information:  General  Reason for Referral: PT Eval and Treat  Referred By: Onesimo Verma MD  Past Medical History Relevant to Rehab: 83 y.o. female with past medical history PE, DVT, COPD on 3 L home oxygen, recent admission for confusion,, congestive heart failure with ejection fraction of 45 to 50%, atrial fibrillation on Eliquis, hypertension, hyperlipidemia presenting with chief complaint of shortness of breath.  Patient is a poor historian, history was supplemented by daughter at bedside.  Patient was apparently requiring up to 6 L to  maintain her oxygen at approximately 95% at SNF, she was accordingly sent in for further management with concern for pneumonia.  Prior to Session Communication: Bedside nurse  Patient Position Received: Bed, 2 rail up, Alarm on (Agreeable to PT)    Home Living:  Home Living  Home Living Comments: Pt was brought in from [facility], however prior to SNF pt is from home alone with ramp entrance and 1 story with laundry in basement.    Prior Level of Function:  Prior Function Per Pt/Caregiver Report  Level of [place]: Independent with ADLs and functional transfers, Independent with homemaking with ambulation (Pt did have assist for laundry, sponge bathed. Pt has since been in rehab.)    Precautions:  Precautions  Precautions Comment: Fall Precautions    Objective     Pain:  Pain Assessment  Pain Assessment:  (10/10 coccyx pain from laying bed, pt's pain resolved once transferred into chair)    Cognition:  Cognition  Overall Cognitive Status: Within Functional Limits    General Assessments:  Sensation  Light Touch: No apparent deficits  Strength  Strength Comments: B LE ROM WFL, R LE strength 4-/5, L LE strength 3+/5  Dynamic Standing Balance  Dynamic Standing-Comments: Poor + standing balance with mod A required    Functional Assessments:  Bed Mobility  Bed Mobility:  (supine to sitting: min A to scoot hips to EOB)  Transfers  Transfer:  (STS and SPT from EOB to bedside chair: mod A x 1 due to weakness and impaired balance.)  Ambulation/Gait Training  Ambulation/Gait Training Performed:  (Deferred amb at this time due to increased KARLI required for SPT.)    Outcome Measures:  [facility] Basic Mobility  Turning from your back to your side while in a flat bed without using bedrails: A little  Moving from lying on your back to sitting on the side of a flat bed without using bedrails: A little  Moving to and from bed to chair (including a wheelchair): A lot  Standing up from a chair using your arms (e.g. wheelchair or  bedside chair): A lot  To walk in hospital room: A lot  Climbing 3-5 steps with railing: A lot  Basic Mobility - Total Score: 14     Goals:  Encounter Problems       Encounter Problems (Active)       PT Problem       STG - Pt will transition supine <> sitting with SBA  (Progressing)       Start:  11/27/23    Expected End:  12/11/23            STG - Pt will transfer STS with CGA  (Progressing)       Start:  11/27/23    Expected End:  12/11/23            STG - Pt will amb 30' using RW with CGA  (Progressing)       Start:  11/27/23    Expected End:  12/11/23               Pain - Adult            Education Documentation  Precautions, taught by Liliana Graves PT at 11/27/2023  1:25 PM.  Learner: Patient  Readiness: Acceptance  Method: Explanation  Response: Verbalizes Understanding    Mobility Training, taught by Liliana Graves PT at 11/27/2023  1:25 PM.  Learner: Patient  Readiness: Acceptance  Method: Explanation  Response: Verbalizes Understanding    Education Comments  No comments found.

## 2023-11-27 NOTE — PROGRESS NOTES
Occupational Therapy    Evaluation    Patient Name: Zayda Grace  MRN: [id number]  Today's Date: 11/27/2023  Time Calculation  Start Time: 1005  Stop Time: 1030  Time Calculation (min): 25 min        Assessment:  End of Session Communication: Bedside nurse       Plan:  Treatment Interventions: ADL retraining, Visual perceptual retraining, Functional transfer training, Endurance training, Equipment evaluation/education, Compensatory technique education  OT Frequency: 3 times per week  OT Discharge Recommendations: Moderate intensity level of continued care  OT - OK to Discharge: Yes (once medically appropriate to next level of care)  Treatment Interventions: ADL retraining, Visual perceptual retraining, Functional transfer training, Endurance training, Equipment evaluation/education, Compensatory technique education    Subjective   Current Problem:  1. Pneumonia due to infectious organism, unspecified laterality, unspecified part of lung        2. Acute respiratory failure with hypoxia (CMS/HCC)          General:  General  Reason for Referral: OT eval and tx; ADLs. dx; pneumonia, acute respiratory failure with hypoxia  Referred By: Onesimo Verma MD  Past Medical History Relevant to Rehab: 83 y.o. female with past medical history PE, DVT, COPD on 3 L home oxygen, recent admission for confusion,, congestive heart failure with ejection fraction of 45 to 50%, atrial fibrillation on Eliquis, hypertension, hyperlipidemia presenting with chief complaint of shortness of breath.  Co-Treatment: PT  Co-Treatment Reason: for safety and to maximize therapeutic performance  Prior to Session Communication: Bedside nurse  Patient Position Received:  (patient seated in bed at start of eval, 2 rails up, alarm on. at EOS, seated in recliner chair, chair alarm engaged, call light in reach, all needs met. purewick, 5L 02 via NC, and IV in place throughout.)  General Comment: chest x-ray: small right and small moderate left PE with  associated atelectasis and/or pneumonitis. suggestive of pulmonary edema/CHF.  Precautions:  Medical Precautions: Fall precautions ([place])    Pain:  Pain Assessment  Pain Assessment:  (reports 10/10 pain in coccyx from sitting in bed too long. pain resolved once transferred to recliner chair)    Objective   Cognition:  Overall Cognitive Status: Within Functional Limits           Home Living:  Type of Home:  (patient has been at SNF for rehab per patient report. prior to that, reports lives in a house, alone, 3 VISH; ramp entrance through back entrance. reports family/friends close by. presenting as a questionable historian at times)  Bathroom Shower/Tub:  (reports sponge bathes as cannot safely access her shower)  Bathroom Toilet:  (high rise toilet with grab bar)  Prior Function:  Level of [place]:  (independent in ADLs/IADLs; granddaughter did laundry for her; prior to SNF admission)       ADL:  LE Dressing Deficit:  (MAX A to chandra non skid socks at bed level)       Bed Mobility/Transfers: Bed Mobility  Bed Mobility:  (completed supine to sit with MIN A to scoot hips to EOB)    Transfers  Transfer:  (completed SPT from EOB to recliner chair with MOD A with support of WW)     Sensation:  Sensation Comment: denies sensation changes  Strength:  Strength Comments: BUE ROM/MMT WFL      Outcome Measures:[facility] Daily Activity  Putting on and taking off regular lower body clothing: A lot  Bathing (including washing, rinsing, drying): A lot  Putting on and taking off regular upper body clothing: A little  Toileting, which includes using toilet, bedpan or urinal: A lot  Taking care of personal grooming such as brushing teeth: A little  Eating Meals: None  Daily Activity - Total Score: 16        Education Documentation  ADL Training, taught by Roxie Albrecht OT at 11/27/2023  1:30 PM.  Learner: Patient  Readiness: Acceptance  Method: Explanation  Response: Verbalizes Understanding, Needs Reinforcement    Body Mechanics,  taught by Roxie Albrecht OT at 11/27/2023  1:30 PM.  Learner: Patient  Readiness: Acceptance  Method: Explanation  Response: Verbalizes Understanding, Needs Reinforcement    Education Comments  No comments found.      Goals:  Encounter Problems       Encounter Problems (Active)       OT Goals       STG- patient will complete LB dressing with CGA with use of ae/ad/dme prn (Progressing)       Start:  11/27/23    Expected End:  12/11/23            STG- patient will complete toileting with CGA with use of ae/ad/dme prn (Progressing)       Start:  11/27/23    Expected End:  12/11/23            STG- patient will complete transfers to/from bed, chair, commode to CGA with use of ae/ad/dme prn (Progressing)       Start:  11/27/23    Expected End:  12/11/23            STG- patient will complete simple mobility with use of ad prn for safety with CGA (Progressing)       Start:  11/27/23    Expected End:  12/11/23            STG- patient will tolerate 15 mins of UB therapeutic exercise to increase UB strength for ADLs and functional transfers/mobility (Progressing)       Start:  11/27/23    Expected End:  12/11/23

## 2023-11-27 NOTE — CONSULTS
“Wound” Ostomy Consultation        1. Chief Complaint: wound check and evaluation   2. History of Present Illness:   sacrum blanchable reddened tissue intact    3. Past Medical History: Reviewed   4. Past Surgical History: Reviewed  5. Allergies:     Reviewed  6. Social/Family History: Reviewed  7. Medications:  Reviewed  8. Labs/Data/Test Results: Reviewed   9. Progress Notes:  Reviewed     10. System Review: system review was performed with these findings:   Integumentary: Will be described below    11.  Physical Examination:   Integumentary: Normal skin color, texture, and turgor, No dry/scaly/cracked skin; No ecchymotic areas; No friable skin; No rash/lesions/excoriation/burns; No diaphoresis; No jaundice, robert, pallor skin;   blanchable red tissue on sacrum, skin is intact, patient is able to reposition herself. Needs reminders     12. Nutritional Status:   Appetite: fair  Diet: regular     13. Wound Pain/Discomfort: No     Assessment/Plan/Treatment Recommendations:     Sacrum: apply Critic aid barrier ointment 3 times a day  Turn as per policy offloading from pressure sites(s)  Interventions as per Levi Scale are in place    Education provided; Treatment demonstrated; Questions answered  D/W RN / MD  Orders received     I have spent 15 minutes with the patient for physical and wound assessment, wound cleaning, dressing demonstration and answering questions. More than 50% of the time spent with the patient included education/counselling/coordination of care.     Agustina Domingo RN WOCN

## 2023-11-27 NOTE — PROGRESS NOTES
"Zayda Grace is a 83 y.o. female on day 3 of admission presenting with Pneumonia due to infectious organism, unspecified laterality, unspecified part of lung.    Subjective   Patient seen and examined this am. No acute events over night. The patient states she is still feeling short of breath this morning. The patients home diuretics have been restarted as bp has been stable. Saline nebs have been ordered in addition to xray. Plan to continue current abx therapy.         Objective     Physical Exam  Physical Exam:   General appearance: no acute distress, well-appearing   HEENT: Atraumatic/normocephalic, moist mucosa  Neck: supple without obvious goiter, JVD not appreciated  Respiratory: increased resp. Effort. Craclkes BL  Cardiovascular: regular rate, regular rhythm, no peripheral edema  Abdomen: no organomegaly, no tenderness to palpation in all quadrants  Extremities: strong peripheral pulses, no grossly obvious deformities   Skin: intact, no rashes   Neurologic: Alert and oriented x 3, No obvious focal deficit  Psych: appropriate mood & affect, cooperative   Last Recorded Vitals  Blood pressure 117/56, pulse 70, temperature 36.2 °C (97.2 °F), temperature source Temporal, resp. rate 17, height 1.702 m (5' 7\"), weight 72.6 kg (160 lb 0.9 oz), SpO2 93 %.  Intake/Output last 3 Shifts:  I/O last 3 completed shifts:  In: 1060 (14.6 mL/kg) [P.O.:960; IV Piggyback:100]  Out: 4425 (61 mL/kg) [Urine:4425 (1.7 mL/kg/hr)]  Weight: 72.6 kg     Relevant Results                             Assessment/Plan   Principal Problem:    Pneumonia due to infectious organism, unspecified laterality, unspecified part of lung  #Acute on chronic hypoxic respiratory failure  #HAP  #Possible COPD exacerbation  #Bilateral pleural effusion  #Sepsis  #Hypotension, resolved  #History of DVT  #History of PE  #small sacral wound  -Zosyn/azithromycin 11/24-  -Legionella and strep pneumo urine antigens, MRSA negative  -Continuous pulse " oximetry  -Wean 02 as tolerated  -Monitor on telemetry  -Continue home Eliquis  -follow procal and blood and urine cultures  -PRN and scheduled duonebs  -scheduled mucinex and solumedrol  -Restart home diuretics as bp now stable   -wound care consult placed  -Broch hygiene      Chronic conditions:     #Hypertension  #Hyperlipidemia  #Heart failure with preserved ejection fraction  #GERD  #Urinary incontinence  #Hx COPD  -Continue home Cardizem, losartan with hold parameters, oxybutynin, pantoprazole, sertraline, atorvastatin  -continue home CPAP  -PT/OT/Speech/SW consults     FULL CODE              Miller Zapata DO

## 2023-11-27 NOTE — PROGRESS NOTES
11/27/23 1430   Discharge Planning   Support Systems Children;Family members   Assistance Needed ambulation, ADLs   Patient expects to be discharged to: return to [facility]     I met with patient at her bedside.  She presented from [facility] where she had been for less than 2 days before being readmitted here.  Patient ambulates with a walker, reported falling 1.5 months ago.  Patient verbalized her preference to return to [facility] to regain strength and independence in order to return home eventually.  Referral to be sent and Care Coordination team to continue to follow for assistance with discharge planning.  Jovanna MOON TCC

## 2023-11-28 NOTE — CARE PLAN
The patient's goals for the shift include  Shifting weight and elevating legs while in chair    The clinical goals for the shift include Keep CPAP on    Over the shift, the patient did not make progress toward the following goals. Barriers to progression include anxiety . Recommendations to address these barriers include ordered pharmacological interventions.    Problem: Skin  Goal: Decreased wound size/increased tissue granulation at next dressing change  Outcome: Not Progressing  Goal: Promote/optimize nutrition  Outcome: Not Progressing

## 2023-11-28 NOTE — PROGRESS NOTES
11/28/23 0807   Discharge Planning   Patient expects to be discharged to: [facility]   Does the patient need discharge transport arranged? Yes   RoundTrip coordination needed? Yes   Has discharge transport been arranged? No     0808:  Precert team notified to submit precert.

## 2023-11-28 NOTE — CONSULTS
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  Patient was found to have an acute on chronic hypoxemic respiratory failure in the setting of a possible COPD exacerbation.  Patient was admitted to the medical service.  Started on broad-spectrum IV antibiotics.  Supplemental oxygen provided.  Given the underlying comorbidities and poor prognosis, palliative care consulted for advanced care planning.       Upon encounter, patient has the CPAP on.  This has been placed recently as she has been experiencing shortness of breath.  The Airvo did not help.  At her bedside is her son who is her POA.  Further history was taken from the son.  He said that at baseline, patient is on 4 L nasal cannula oxygen continuous.  Has been living at home independent ADLs but has been having an overall decline in her functional status the past several months and has not been able to take care of herself.  Has been missing to take her medications and nebulizer treatments.  After the recent hospitalization, she was discharged for rehab.  But soon after she came back to the hospital.  Today she has been having more shortness of breath.  Patient herself is severe hearing loss.  It was difficult to have a conversation with her.  But she did report having shortness of breath.    Review of Systems  10 systems were reviewed and were negative except for those noted in the history of present  illness.     Past Medical History  She has a past medical history of Acute embolism and thrombosis of other specified deep vein of lower extremity, bilateral (CMS/HCC) (12/16/2016) and Personal history of pulmonary embolism (12/16/2016).  Pertinent medical history also documented in my above narrative    Surgical History  She has a past surgical history that includes Back surgery (12/16/2016); Knee surgery (12/16/2016); MR angio head wo IV contrast (7/8/2016); and MR angio neck wo IV contrast (7/8/2016).  Pertinent surgical history also documented in my above narrative     Social History  She reports that she has never smoked. She has never been exposed to tobacco smoke. She has never used smokeless tobacco. No history on file for alcohol use and drug use.    Family History  No family history on file.     Allergies  Codeine, Etodolac, Hydromorphone, Lisinopril, and Tramadol    Home Medications  Medications Prior to Admission   Medication Sig Dispense Refill Last Dose    albuterol 90 mcg/actuation inhaler Inhale 2 puffs every 4 hours if needed for shortness of breath.   Unknown    apixaban (Eliquis) 5 mg tablet Take 1 tablet (5 mg) by mouth 2 times a day.   Unknown    atorvastatin (Lipitor) 10 mg tablet Take 1 tablet (10 mg) by mouth once daily.   Unknown    cholecalciferol (Vitamin D-3) 50 mcg (2,000 unit) capsule Take 1 tablet by mouth once daily.   Unknown    cyanocobalamin (Vitamin B-12) 500 mcg tablet Take 1 tablet (500 mcg) by mouth once daily.   Unknown    dapagliflozin propanediol (Farxiga) 10 mg Take 1 tablet (10 mg) by mouth once daily. Do not start before November 23, 2023. 30 tablet 0 Unknown    diclofenac sodium (Voltaren) 1 % gel gel Apply 1 Application topically 4 times a day as needed (pain- mild 1-3, bilateral feet). 4 g 0 Unknown    dilTIAZem CD (Tiazac) 120 mg 24 hr capsule Take 1 capsule (120 mg) by mouth once daily. Do not start before November 23, 2023. 30 capsule 0 Unknown    fluticasone  (Flonase) 50 mcg/actuation nasal spray Administer 2 sprays into each nostril once daily as needed.   Unknown    furosemide (Lasix) 40 mg tablet Take 1 tablet (40 mg) by mouth once daily. Do not start before November 23, 2023. 30 tablet 0 Unknown    ipratropium-albuteroL (Duo-Neb) 0.5-2.5 mg/3 mL nebulizer solution Take 3 mL by nebulization every 6 hours if needed for shortness of breath.   Unknown    ipratropium-albuteroL (Duo-Neb) 0.5-2.5 mg/3 mL nebulizer solution Take 3 mL by nebulization 4 times a day.   Unknown    losartan (Cozaar) 25 mg tablet Take 0.5 tablets (12.5 mg) by mouth once daily.   Unknown    omeprazole (PriLOSEC) 40 mg DR capsule Take 1 capsule (40 mg) by mouth once daily.   Unknown    oxybutynin XL (Ditropan-XL) 10 mg 24 hr tablet Take 1 tablet (10 mg) by mouth once daily.   Unknown    polyethylene glycol (Glycolax, Miralax) 17 gram packet Take 17 g by mouth once daily.   Unknown    sertraline (Zoloft) 50 mg tablet Take 1 tablet (50 mg) by mouth once daily.   Unknown    umeclidinium-vilanteroL (Anoro Ellipta) 62.5-25 mcg/actuation blister with device Inhale 1 puff once daily.   Unknown        Current Hospital Medications    Current Facility-Administered Medications:     acetaminophen (Tylenol) tablet 650 mg, 650 mg, oral, q4h PRN, Denilson Mobley DO, 650 mg at 11/26/23 1641    apixaban (Eliquis) tablet 5 mg, 5 mg, oral, BID, Denilson Mobley DO, 5 mg at 11/27/23 0927    atorvastatin (Lipitor) tablet 10 mg, 10 mg, oral, Daily, Denilson Mobley DO, 10 mg at 11/27/23 0926    azithromycin (Zithromax) in dextrose 5 % in water (D5W) 250 mL  mg, 500 mg, intravenous, q24h, Denilson Mobley DO, Stopped at 11/27/23 1828    [START ON 11/28/2023] dapagliflozin propanediol (Farxiga) tablet 10 mg, 10 mg, oral, Daily, Miller Zapata DO    diclofenac sodium (Voltaren) 1 % gel 1 Application, 4 g, Topical, 4x daily PRN, Denilson Mobley DO    dilTIAZem CD (Cardizem CD) 24 hr capsule 120 mg, 120 mg, oral, Daily, Maxim  DO Itz, 120 mg at 11/27/23 0926    fluticasone (Flonase) nasal spray 2 spray, 2 spray, Each Nostril, Daily, Denilson Mobley DO, 2 spray at 11/26/23 1020    [START ON 11/28/2023] furosemide (Lasix) tablet 40 mg, 40 mg, oral, Daily, Miller Zapata DO    guaiFENesin (Mucinex) 12 hr tablet 600 mg, 600 mg, oral, BID, Denilson Mobley DO, 600 mg at 11/27/23 0926    hydrOXYzine HCL (Atarax) tablet 25 mg, 25 mg, oral, Once, Shakila Vale MD    ipratropium-albuteroL (Duo-Neb) 0.5-2.5 mg/3 mL nebulizer solution 3 mL, 3 mL, nebulization, q2h PRN, Denilson Mobley DO, 3 mL at 11/27/23 0925    ipratropium-albuteroL (Duo-Neb) 0.5-2.5 mg/3 mL nebulizer solution 3 mL, 3 mL, nebulization, TID, Onesimo Verma MD, 3 mL at 11/27/23 1837    losartan (Cozaar) tablet 12.5 mg, 12.5 mg, oral, Daily, Denilson Mobley DO, 12.5 mg at 11/27/23 0926    methylPREDNISolone sod succinate (PF) (SOLU-Medrol) 40 mg/mL injection 40 mg, 40 mg, intravenous, q24h, Denilson Mobley DO, 40 mg at 11/26/23 1958    oxybutynin (Ditropan) tablet 5 mg, 5 mg, oral, BID, Denilson Mobley DO, 5 mg at 11/27/23 0926    oxygen (O2) therapy, , inhalation, Continuous PRN - O2/gases, Denilson Mobley DO, Start at 11/26/23 0740    oxygen (O2) therapy, , inhalation, Continuous PRN - O2/gases, Ronnell Hurst DO    pantoprazole (ProtoNix) EC tablet 40 mg, 40 mg, oral, Daily before breakfast, Denilson Mobley DO, 40 mg at 11/27/23 0603    piperacillin-tazobactam-dextrose (Zosyn) IV 3.375 g, 3.375 g, intravenous, q6h, Maxim Kostyk, DO, Stopped at 11/27/23 1649    polyethylene glycol (Glycolax, Miralax) packet 17 g, 17 g, oral, Daily, Maxim Kostyk, DO, 17 g at 11/27/23 0926    sertraline (Zoloft) tablet 50 mg, 50 mg, oral, Daily, Maxim Kostyk, DO, 50 mg at 11/27/23 0927    sodium chloride 3 % nebulizer solution 3 mL, 3 mL, nebulization, q6h [facility], Miller Zapata, DO, 3 mL at 11/27/23 1837       Physical Exam  Physical Exam  Constitutional:       General: She is in acute distress.  "     Appearance: She is ill-appearing.      Comments: Awake alert and oriented x3.  She is frail looking and cachectic   HENT:      Mouth/Throat:      Mouth: Mucous membranes are dry.      Pharynx: Oropharynx is clear.   Eyes:      Pupils: Pupils are equal, round, and reactive to light.   Cardiovascular:      Rate and Rhythm: Normal rate and regular rhythm.      Heart sounds: Normal heart sounds.   Pulmonary:      Comments: She appears to be in respiratory distress.  Using accessory and abdominal muscles for breathing.  Poor air entry was distant breath sounds and diffuse rhonchi.  Abdominal:      General: Abdomen is flat. Bowel sounds are normal. There is no distension.      Palpations: Abdomen is soft.      Tenderness: There is no abdominal tenderness.   Musculoskeletal:      Comments: There is moderate pitting edema in both legs   Skin:     General: Skin is dry.      Coloration: Skin is pale.   Neurological:      General: No focal deficit present.   Psychiatric:         Mood and Affect: Mood normal.         Behavior: Behavior normal.         Thought Content: Thought content normal.         Judgment: Judgment normal.           Last Recorded Vitals  Blood pressure 131/71, pulse (!) 114, temperature 36.1 °C (97 °F), resp. rate (!) 46, height 1.702 m (5' 7\"), weight 72.6 kg (160 lb 0.9 oz), SpO2 95 %.    Relevant Results  Results for orders placed or performed during the hospital encounter of 11/24/23 (from the past 24 hour(s))   CBC   Result Value Ref Range    WBC 6.6 4.4 - 11.3 x10*3/uL    nRBC 0.0 0.0 - 0.0 /100 WBCs    RBC 3.34 (L) 4.00 - 5.20 x10*6/uL    Hemoglobin 9.8 (L) 12.0 - 16.0 g/dL    Hematocrit 33.3 (L) 36.0 - 46.0 %     80 - 100 fL    MCH 29.3 26.0 - 34.0 pg    MCHC 29.4 (L) 32.0 - 36.0 g/dL    RDW 12.9 11.5 - 14.5 %    Platelets 197 150 - 450 x10*3/uL   Basic metabolic panel   Result Value Ref Range    Glucose 135 (H) 74 - 99 mg/dL    Sodium 140 136 - 145 mmol/L    Potassium 3.9 3.5 - 5.3 " mmol/L    Chloride 98 98 - 107 mmol/L    Bicarbonate 36 (H) 21 - 32 mmol/L    Anion Gap 10 10 - 20 mmol/L    Urea Nitrogen 27 (H) 6 - 23 mg/dL    Creatinine 0.80 0.50 - 1.05 mg/dL    eGFR 72 >60 mL/min/1.73m*2    Calcium 8.7 8.6 - 10.3 mg/dL        Imaging  XR chest 1 view    Result Date: 11/27/2023  Interpreted By:  Lucho Hill, STUDY: XR CHEST 1 VIEW;  11/27/2023 1:42 pm   INDICATION: Signs/Symptoms:increased o2 needs.   COMPARISON: 11/24/2023   ACCESSION NUMBER(S): [id number]   ORDERING CLINICIAN: EDILMA BLACKWELL   FINDINGS: Single frontal view chest   Heart enlarged similar to prior. Prominence and atherosclerotic change aortic arch redemonstrated. Mild diffuse interstitial prominence may reflect component of edema similar to prior. Mild patchy opacities particularly mid to lower lung zone on the right and left lung base/retrocardiac opacities and evidence of bilateral effusions remain similar. Unchanged focal opacity at the right lung base better evaluated on recent CT chest 11/24/2023. Continued clinical correlation and follow-up advised.       1. As above.       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  Patient was found to have an acute on chronic hypoxemic respiratory failure in the setting of a possible COPD exacerbation.  Patient was admitted to the medical service.  Started on  broad-spectrum IV antibiotics.  Supplemental oxygen provided.  Given the underlying comorbidities and poor prognosis, palliative care consulted for advanced care planning.      I had a discussion with the patient's son at bedside.  I started the meeting by introducing the practice of palliative care and the services it provides.  I then reviewed at length with patient/family the clinical diagnosis/medical problems that the patient presented with and the treatments provided so far. We discussed the implications of the current circumstances and option plans going forward.  We then reviewed at length the nature of the patient's primary disease--COPD, its progression, and potential complications in the long run.  Son demonstrated understanding of the information I provided.  I did tell the son that currently the prognosis is considered guarded.  We are still optimizing medical therapy.  Another challenge is her congestive heart failure.  But yet we will continue to optimize.    Son told me that he has not heard yet from the primary team.  He comes only late in the evenings because of his work during the day.  He is hoping to hear back from the primary team.  I told him that I will transfer the message.    We then discussed the CODE STATUS and what are the differences between being a Full Code versus a DNR (DO NOT RESUSCITATE).  I first started by explaining that DNR does not mean do not treat.  I then explained in details what does it mean to be a full code and what actions/procedures are taken when an individual has a cardiac/respiratory arrest and CPR (cardiopulmonary resuscitation) is done.  I did explain that by definition, when there is no pulse, that means death.  CPR is an intervention that tries to reverse death but not a treatment to prevent it from happening.  While this is considered an appropriate intervention in many situations; however, for those with advanced medical problems, especially an underlying  irreversible/progressive/incurable disease, such an intervention can cause more harm than benefit and is most of the time considered futile.  With this in mind, I would recommend against CPR and change the CODE STATUS to DO NOT RESUSCITATE.  I also explained in details the difference between DNR CC and DNR CCA.  I myself and the son both of us did discuss with the patient about the intubation part.  And she said that she definitely does not want to be intubated.    I will go ahead and make the CODE STATUS DNR CCA.  The son agreed with that.  I supported this decision.            (This note was generated with voice recognition software and may contain errors including spelling, grammar, syntax and misrecognition of what was dictated, that are not fully corrected)     River Diaz MD

## 2023-11-28 NOTE — PROGRESS NOTES
Speech-Language Pathology    Inpatient  Speech-Language Pathology Treatment     Patient Name: Zayda Grace  MRN: [id number]  Today's Date: 11/28/2023  Time Calculation  Start Time: 1015  Stop Time: 1030  Time Calculation (min): 15 min         Current Problem:   1. Pneumonia due to infectious organism, unspecified laterality, unspecified part of lung        2. Acute respiratory failure with hypoxia (CMS/HCC)              SLP Assessment:  SLP TX Intervention Outcome: Decline in Function  Prognosis: Guarded       Plan:  Inpatient/Swing Bed or Outpatient: Inpatient  SLP TX Plan: Goals Adjusted    Dysphagia Goals:   Patient will tolerate recommended diet without observed clinical signs of aspiration; DISCONTINUED.   Patient will demonstrate appropriate strategies for swallowing safety; DISCONTINUED.   Patient/family education; ONGOING.   Complete MBSS on 11/29 with goals to follow. NEW GOAL.        Most Recent Visit:  SLP Received On: 11/28/23      Pt minimally arousable. Sitting up in bed with untouched breakfast tray in front of her. Pt currently on airvo 50L 45%. Increasing 02 requirements last few days; no on airvo. Nurse Doaa reporting pt exhibited difficulty with pills this morning during med pass, and that pt should be on cpap; put on airvo to eat breakfast. 89% 02 on airvo. Pt had CSE on 11/25 that recommended regular diet with thin liquids; NO STRAWS and pills in puree carrier. Pt's current diet order is for EASY TO CHEW.     Pt able to awaken a little more this morning given verbal and tactile cueing from SLP. Pt does endorse difficulty swallowing today when questioned by SLP. Pt seen with mary kate cracker (per pt request and already sitting on breakfast tray), puree sauce and thin water. Pt exhibiting inconsistent coughing, change in vocal quality and overt s/s aspiration at bedside across PO trials. Mildly delayed coughing after thin liquids with change in respirations and audible respirations evident. Audible  swallows observed at bedside. Needed water and/or puree to moisten cracker in mouth and ease in bolus formation and A-P transit with increased time required and change in vocal quality post swallow. Recommend NPO at this time.       Pain Assessment:   Pain Assessment: 0-10  Pain Score: 0 - No pain      Objective       Therapeutic Swallow:  Therapeutic Swallow Intervention : PO Trials    Inconsistent coughing and at times overt s/s aspiration with various PO trials this morning. Increasing 02 demands over the weekend compared to at [facility] eval on 11/25. Recommend pt be NPO (except meds ok crushed in puree carrier) with MBSS recommended.          Inpatient Education:    Reviewed results and recommendations with nurse Abrams and Dr Hurst.

## 2023-11-28 NOTE — CONSULTS
"Nutrition Note  Reason for Assessment  Reason for Assessment: Admission nursing screening (Missed MST = 2)     Assessment    Nutrition History:  Energy Intake: Poor < 50 % (per family, no meal intakes recorded)  Food and Nutrient History: Information obtained from pt's family members at bedside. Pt did not contribute any history. Per family pt's intake has been variable this admission. She mostly eats side dishes like mashed potatoes and fruit and about 50% of the protein at the meal. Family report pt's intake was similar to this PTA. Pt was made NPO after breakfast this morning. A BSE was done 11/25 recommending regular diet with thin liquids. Repeat assessment done today recommending NPO and MBS to be completed 11/29. Limited information obtained as pt was calling out in pain and requesting to be repositioned.  Vitamin/Herbal Supplement Use: 50 mcg vitamin D3, 500 mcg vitamin B12    Difficulty chewing: MBS pending  Difficulty swallowing: MBS pending    PMH, meds, and labs reviewed.  Dietary Orders (From admission, onward)       Start     Ordered    11/28/23 1048  NPO Diet Except: Sips with meds; Effective now  Diet effective now        Question:  Except:  Answer:  Sips with meds    11/28/23 1047                  Independent    GI per flowsheet:  Gastrointestinal  Gastrointestinal (WDL): Exceptions to WDL  Last BM Date: 11/28/23  Bowel Incontinence: Yes  Last bowel movement documented: 11/28/23  Allergies: Codeine, Etodolac, Hydromorphone, Lisinopril, and Tramadol     Anthropometrics:  Height: 170.2 cm (5' 7\")  Weight: 72.6 kg (160 lb 0.9 oz)  BMI (Calculated): 25.06  IBW: 61.4 kg      Weight History / % Weight Change: 11/13/23 72.6 kg (question if this was copied for current weight), 8/11/23 67.9 kg- 6.5% weight gain x3.5 months; 2/9/23 72.7 kg       Significant Weight Loss: No           Estimated Nutritional Needs:  Method for Estimating Needs: 9547-5982 (23-25 kcals/kg)    Method for Estimating Needs: 37-73 " (1.1-1.2 g/kg IBW)    Method for Estimating Needs: 1825 mL (25 mL/kg) or as per MD    Nutrition Focused Physical Findings:   Orbital Fat Pads: Defer (pt in pain, requesting that RD get her nurse)         Edema  Edema: +2 mild  Edema Location: BLE    Skin: Positive (red sacrum)  Pain Score: 0 - No pain     Nutrition Diagnosis   Patient has Malnutrition Diagnosis: No (insufficient data available to make diagnosis at this time)      Plan    Interventions     Collaboration and Referral of Nutrition Care: Other (Comment) (pt's family)  Goal: NPO <5 days  Additional Interventions: Will monitor for results of MBS and initiate appropriate nutrition interventions as indicated      Nutrition Monitoring and Evaluation   Body Composition/Growth/Weight History  Monitoring and Evaluation Plan: Weight  Weight: Measured weight  Criteria: maintain weight, reweigh at least every 5 days  Food/Nutrient Related History Monitoring  Additional Plans: Will monitor for SLP recommendations followng MBS      Education Documentation  No documentation found.        Time Spent (min): 45 minutes  Last Date of Nutrition Visit: 11/28/23  Nutrition Follow-Up Needed?: 3-5 days

## 2023-11-28 NOTE — DOCUMENTATION CLARIFICATION NOTE
"    PATIENT:               BAM NERI  ACCT #:                  [id number]  MRN:                       [id number]  :                       1940  ADMIT DATE:       2023 2:21 PM  DISCH DATE:  RESPONDING PROVIDER #:        [id number]          PROVIDER RESPONSE TEXT:    Sepsis was a differential diagnosis and ruled out after study    CDI QUERY TEXT:    [facility]_CV Sepsis        Instruction:  Based on your assessment of the patient and the clinical information, please provide the requested documentation by clicking on the appropriate radio button and enter any additional information if prompted.    Question: Sepsis was documented in the medical record. Based on the documentation and the clinical information, can the diagnosis be further clarified as    When answering this query, please exercise your independent professional judgment. The fact that a question is being asked, does not imply that any particular answer is desired or expected.    The patient's clinical indicators include:  Clinical Information: Per H/P \"83 y.o. female with past medical history PE, DVT, COPD on 3 L home oxygen, recent admission for confusion,, congestive heart failure with ejection fraction of 45 to 50 percent, atrial fibrillation on Eliquis, hypertension, hyperlipidemia presenting with chief complaint of shortness of breath. Patient is a poor historian, history was supplemented by daughter at bedside. Patient was apparently requiring up to 6 L to maintain her oxygen at approximately 95 percent at SNF, she was accordingly sent in for further management with concern for pneumonia.\"    Documented Diagnosis:  Per H/P 23 by Dr. Denilson Mobley \"Sepsis  hypotension\"    Clinical Indicators:23  -Vital Signs:  23 1434   T 37.0   HR 77  RR 19   103/53   95 percent 4L NC  -WBC: 5.4  -Microbiology Results: NA  -Band Neutrophil Count/percent Band Neutrophil: NA  -Lactic acid: NA  -BUN/Creat: 24/ 0.81  -Blood cultures: " Negative  -Bilirubin: 0.5  -MAP: 74  -Hipolito Coma Scale: NA  -PAO2/FIO2: 4L NC  -Procalcitonin: 0.12  -Platelets: 140  -Other clinical indicators: NA    Treatment: IV Zosyn, IV Vancomycin, IV Zithromax, IVF0    Risk Factors: Age, past medical history PE, DVT, COPD on 3 L home oxygen, CHF, HTN, HLD  Options provided:  -- Sepsis was a differential diagnosis and ruled out after study  -- Sepsis with other organ dysfunction, Please specify additional information below  -- Other - I will add my own diagnosis  -- Refer to Clinical Documentation Reviewer    Query created by: Riddhi Wiseman on 11/27/2023 10:29 AM      Electronically signed by:  TERI LANGSTON MD 11/28/2023 10:16 AM

## 2023-11-28 NOTE — PROGRESS NOTES
Zayda Grace is a 83 y.o. female on day 4 of admission presenting with Pneumonia due to infectious organism, unspecified laterality, unspecified part of lung.      Subjective   Patient seen and examined this AM. Overnight the patient desaatted into the 80's and was subsequently placed on airvo. CXR was obtained as the patient had worsening resp. Status. Given 1x time of lasix. Continue with aggressive bronch hygiene. Patient was seen by palliative and code status changed to DNR/DNI.       Objective     Last Recorded Vitals  /58   Pulse 69   Temp 36.8 °C (98.2 °F)   Resp 18   Wt 72.6 kg (160 lb 0.9 oz)   SpO2 92%   Intake/Output last 3 Shifts:    Intake/Output Summary (Last 24 hours) at 11/28/2023 1444  Last data filed at 11/28/2023 1145  Gross per 24 hour   Intake 850 ml   Output --   Net 850 ml       Admission Weight  Weight: 72.6 kg (160 lb 0.9 oz) (11/24/23 1434)    Daily Weight  11/24/23 : 72.6 kg (160 lb 0.9 oz)    Image Results  XR chest 1 view  Narrative: Interpreted By:  Lucho Hill,   STUDY:  XR CHEST 1 VIEW;  11/27/2023 1:42 pm      INDICATION:  Signs/Symptoms:increased o2 needs.      COMPARISON:  11/24/2023      ACCESSION NUMBER(S):  [id number]      ORDERING CLINICIAN:  EDILMA BLACKWELL      FINDINGS:  Single frontal view chest      Heart enlarged similar to prior. Prominence and atherosclerotic  change aortic arch redemonstrated. Mild diffuse interstitial  prominence may reflect component of edema similar to prior. Mild  patchy opacities particularly mid to lower lung zone on the right and  left lung base/retrocardiac opacities and evidence of bilateral  effusions remain similar. Unchanged focal opacity at the right lung  base better evaluated on recent CT chest 11/24/2023. Continued  clinical correlation and follow-up advised.      Impression: 1. As above.              MACRO:  None      Signed by: Lucho Hill 11/27/2023 2:41 PM  Dictation workstation:   [id number]      Physical  Exam  General appearance: Mild distress   HEENT: Atraumatic/normocephalic, moist mucosa  Neck: supple without obvious goiter, JVD not appreciated  Respiratory: increased resp. Effort. Craclkes BL  Cardiovascular: regular rate, regular rhythm, no peripheral edema  Abdomen: no organomegaly, no tenderness to palpation in all quadrants  Extremities: strong peripheral pulses, no grossly obvious deformities   Skin: intact, no rashes   Neurologic: Alert and oriented x 3, No obvious focal deficit  Psych: appropriate mood & affect, cooperative   Relevant Results               Assessment/Plan                  Principal Problem:    Pneumonia due to infectious organism, unspecified laterality, unspecified part of lung    #Acute on chronic hypoxic respiratory failure  #HAP  #Possible COPD exacerbation  #Bilateral pleural effusion  #Sepsis  #Hypotension, resolved  #History of DVT  #History of PE  #small sacral wound  -Zosyn/azithromycin 11/24-  -Legionella and strep pneumo urine antigens, MRSA negative  -Continuous pulse oximetry  -Wean 02 as tolerated  -Monitor on telemetry  -Continue home Eliquis  -follow procal and blood and urine cultures, NGTD  -PRN and scheduled duonebs  -scheduled mucinex and solumedrol  -Continue home diuretics    -Given 1x dose 40mg lasix IV  -wound care consult placed, orders signed   -Broch hygiene, mucomyst     Chronic conditions:     #Hypertension  #Hyperlipidemia  #Heart failure with preserved ejection fraction  #GERD  #Urinary incontinence  #Hx COPD  -Continue home Cardizem, losartan with hold parameters, oxybutynin, pantoprazole, sertraline, atorvastatin  -continue home CPAP  -PT/OT/Speech/SW consults    CODE: DNR/DNI              Miller Zapata DO

## 2023-11-29 NOTE — PROGRESS NOTES
Zayda Grace is a 83 y.o. female on day 5 of admission presenting with Pneumonia due to infectious organism, unspecified laterality, unspecified part of lung.      Subjective   Patient seen and examined this am. The patient is now requiring continuous bipap and will need transferred to step-down. Pulmonology has been consulted, awaiting any further recommendations. The poa has been contacted and would like to continue treatment at this time, if there is no further improvement or significant decline would be open to pursue comfort care.       Objective     Last Recorded Vitals  /76   Pulse 105   Temp 35.8 °C (96.4 °F)   Resp (!) 27   Wt 72.6 kg (160 lb 0.9 oz)   SpO2 94%   Intake/Output last 3 Shifts:    Intake/Output Summary (Last 24 hours) at 11/29/2023 1341  Last data filed at 11/29/2023 1104  Gross per 24 hour   Intake 550 ml   Output 700 ml   Net -150 ml       Admission Weight  Weight: 72.6 kg (160 lb 0.9 oz) (11/24/23 1434)    Daily Weight  11/24/23 : 72.6 kg (160 lb 0.9 oz)    Image Results  XR chest 1 view  Narrative: Interpreted By:  Jazmyn Purdy,   STUDY:  XR CHEST 1 VIEW;  11/28/2023 5:59 pm      INDICATION:  Signs/Symptoms:Increased o2 needs.      COMPARISON:  11/27/2023      ACCESSION NUMBER(S):  [id number]      ORDERING CLINICIAN:  EDILMA BLACKWELL      FINDINGS:          CARDIOMEDIASTINAL SILHOUETTE:  Stable cardiomegaly.      LUNGS:  Increasing right basilar patchy airspace opacity. Small bilateral  pleural effusions. Hyperinflated lungs with interstitial coarsening  consistent with edema/COPD. No pneumothorax.      ABDOMEN:  No remarkable upper abdominal findings.      BONES:  No acute osseous abnormality.      Impression: Increasing patchy right basilar airspace opacity. Please correlate  clinically for pneumonia.      Stable small pleural effusions.      COPD/emphysema.      MACRO:  None      Signed by: Jazmyn Purdy 11/28/2023 6:43 PM  Dictation workstation:    [id number]      Physical Exam  General appearance: Mild distress   HEENT: Atraumatic/normocephalic, moist mucosa  Neck: supple without obvious goiter, JVD not appreciated  Respiratory: increased resp. Effort. Craclkes BL  Cardiovascular: regular rate, regular rhythm, mild peripheral edema  Abdomen: no organomegaly, no tenderness to palpation in all quadrants  Extremities: strong peripheral pulses, no grossly obvious deformities   Neurologic: Alert and oriented x 2, No obvious focal deficit  Psych: appropriate mood & affect  Relevant Results               Assessment/Plan   This patient currently has cardiac telemetry ordered; if you would like to modify or discontinue the telemetry order, click here to go to the orders activity to modify/discontinue the order.              Principal Problem:    Pneumonia due to infectious organism, unspecified laterality, unspecified part of lung  #Acute on chronic hypoxic respiratory failure  #HAP  #Possible COPD exacerbation  #Bilateral pleural effusion  #Sepsis  #Hypotension, resolved  #History of DVT  #History of PE  #small sacral wound  -Zosyn/azithromycin 11/24-  -Legionella and strep pneumo urine antigens, MRSA negative  -Continuous pulse oximetry  -Wean 02 as tolerated  -Monitor on telemetry  -Continue home Eliquis  -follow procal and blood and urine cultures, NGTD  -PRN and scheduled duonebs  -scheduled mucinex and solumedrol  -Continue home diuretics    -wound care consult placed, orders signed   -Broch hygiene, mucomyst  -Given 250ml bolus and k replete  -Pulm consulted, awaiting any further recs  -Now on continuous bipap, transfer to step-down  -Continue talks with poa, if there is no significant improvement or further decline, will contact poa to discuss comfort care       Chronic conditions:     #Hypertension  #Hyperlipidemia  #Heart failure with preserved ejection fraction  #GERD  #Urinary incontinence  #Hx COPD  -Continue home Cardizem, losartan with hold  parameters, oxybutynin, pantoprazole, sertraline, atorvastatin  -PT/OT/Speech/SW consults     CODE: DNR/DNI                Miller Zapata DO

## 2023-11-29 NOTE — PROGRESS NOTES
Spiritual Care Visit    Clinical Encounter Type  Visited With: Patient and family together  Routine Visit: Introduction  Continue Visiting: Yes    Druze Encounters  Druze Needs: Spiritual care brochure, Prayer     welcomed by caregivers who helped patient understand  role and purpose.  Patient embraced spirit ual care as evidenced by making sign of cross and reciting familiar, ancient prayer with  before blessing.  Additional visits advised.

## 2023-11-29 NOTE — PROGRESS NOTES
"Physical Therapy    Physical Therapy Treatment    Patient Name: Zayda Grace  MRN: [id number]  Today's Date: 11/29/2023  Time Calculation  Start Time: 0850  Stop Time: 0913  Time Calculation (min): 23 min       Assessment/Plan         PT Plan  Treatment/Interventions: Bed mobility, Transfer training, Gait training  PT Plan: Skilled PT  PT Frequency: 3 times per week  PT Discharge Recommendations: Moderate intensity level of continued care  PT - OK to Discharge: Yes (to next level of care once cleared by medical team)      General Visit Information:   PT  Visit  PT Received On: 11/29/23  General  General Comment:  (RN, William, cleared pt to participate in PT tx as able.)    Subjective   Precautions:     Vital Signs:  Vital Signs  SpO2:  (on airvo, pt complains \"I can't breath\" throughout session.)    Objective   Pain:  Pain Assessment  Pain Assessment:  (Shakes head no regarding pain.)  Cognition:     Postural Control:     Extremity/Trunk Assessments:    Activity Tolerance:     Treatments:  Bed Mobility  Bed Mobility:  (Pt performs rolling R and L with up to mod A multiple trials for trunk strengthening. Pt does initiate supine > long sitting with B bed rails and CGA for trunk strengthening and to improve breathing after multiple rolling attempts.)    Other Activity  Other Activity Performed:  (PT and PCA performed lavell car for an extended period of time due to pt severly soiled throughout entire front and back of her brief with urine and stool.)    Outcome Measures:  [facility] Basic Mobility  Turning from your back to your side while in a flat bed without using bedrails: A lot  Moving from lying on your back to sitting on the side of a flat bed without using bedrails: A lot  Moving to and from bed to chair (including a wheelchair): A lot  Standing up from a chair using your arms (e.g. wheelchair or bedside chair): A lot  To walk in hospital room: A lot  Climbing 3-5 steps with railing: Total  Basic Mobility - Total Score: " 11    Education Documentation  Precautions, taught by Jamilah Pan PT at 11/29/2023 10:13 AM.  Learner: Patient  Readiness: Acceptance  Method: Explanation  Response: Verbalizes Understanding    Mobility Training, taught by Jamilah Pan PT at 11/29/2023 10:13 AM.  Learner: Patient  Readiness: Acceptance  Method: Explanation  Response: Verbalizes Understanding         Encounter Problems       Encounter Problems (Active)       PT Problem       STG - Pt will transition supine <> sitting with SBA  (Progressing)       Start:  11/27/23    Expected End:  12/11/23            STG - Pt will transfer STS with CGA  (Progressing)       Start:  11/27/23    Expected End:  12/11/23            STG - Pt will amb 30' using RW with CGA  (Progressing)       Start:  11/27/23    Expected End:  12/11/23               Pain - Adult

## 2023-11-29 NOTE — PROGRESS NOTES
Speech-Language Pathology                 Therapy Communication Note    Patient Name: Zayda Grace  MRN: [id number]  Today's Date: 11/29/2023     Discipline: Speech Language Pathology    Missed Time: Cancel    Comment: MBS scheduled for today at 10:30. Per communication with RN, patient taken off BiPAP and placed on AIRVO, however, tolerated poorly and was placed back on continuous BiPAP. MBS canceled for today. SLP will continue to follow along with patient's medical course and schedule MBS accordingly.

## 2023-11-29 NOTE — CONSULTS
Zayda Grace is a 83 y.o. female on day 5 of admission presenting with Pneumonia due to infectious organism, unspecified laterality, unspecified part of lung.      Subjective/History     Zayda Pollock an 83-year-old female with a past medical history of PEs, DVTs, COPD on 2-4 L home O2 titrated to blood oxygen, CHF with ejection fraction 45-50, A-fib on Eliquis, hypertension, lipidemia.  Patient presented with worsening cough and shortness of breath.  Of note, patient had a recent hospitalization for 2 weeks where she was treated for pneumonia and was discharged to a SNF.  Within 24 hours of discharge, patient re- presented to hospital for lack of improvement of symptoms.  Patient is unable to engage in questioning due to sedation.  The previous night patient was agitated and received Seroquel/Haldol/Dilaudid.  Patient is currently on BiPAP following findings of CO2 retention on blood gas.  Patient's family states she has a smoking history of several decades with 2-3 pack/day.  Patient has WANDA and uses a CPAP at night.  Patient has COPD, WANDA, and emphysema for which she follows outpatient.  Family understands patient's prognosis is guarded and stated they will adapt their stents on composition based on how she progresses.    In the ED vital signs were temperature 98.6, heart 77, respiratory rate 19, blood pressure 103/53, oxygen saturation 99% on 4 L.  Labs show potassium 3.6, creatinine 0.82, , troponin 28, repeat 26, white blood cell count 7.4, hemoglobin 10.9, negative for flu and coronavirus.  Venous pH 7.4.  MRSA negative. Chest x-ray with small right and small to moderate left-sided pleural effusion with associated atelectasis.  CT angio was negative for central PE, there was left lower lobe consolidation and atelectasis.  There was nodular opacities within the right middle lobe measuring 1.5 times approximately 2 cm.  PET/CT or follow-up CT chest was recommended.  Patient was given azithromycin, Zosyn,  "vancomycin, prednisone in the ED as well as breathing treatments.  She will be admitted to the medicine service.       Past Medical History:   Diagnosis Date    Acute embolism and thrombosis of other specified deep vein of lower extremity, bilateral (CMS/HCC) 12/16/2016    Deep vein thrombosis (DVT) of other vein of both lower extremities    Personal history of pulmonary embolism 12/16/2016    History of pulmonary embolism       Past Surgical History:   Procedure Laterality Date    BACK SURGERY  12/16/2016    Back Surgery    KNEE SURGERY  12/16/2016    Knee Surgery Left    MR HEAD ANGIO WO IV CONTRAST  7/8/2016    MR HEAD ANGIO WO IV CONTRAST 7/8/2016 [facility] [place]    MR NECK ANGIO WO IV CONTRAST  7/8/2016    MR NECK ANGIO WO IV CONTRAST 7/8/2016 [facility] [place] [facility]       family history is not on file.     reports that she has never smoked. She has never been exposed to tobacco smoke. She has never used smokeless tobacco.      Objective       Physical Exam:  General: Sedated, on BiPAP  Chest: Wheezes present bilaterally  CV:  Irregular rhythm  Abdomen: Abdomen is soft, non-tender, mildly distended.   Extremities:  Bilateral lower extremity edema, improving   Neurological:  AAOx3. No focal deficits.  Skin:  Warm and dry.    Last Recorded Vitals  Blood pressure 85/59, pulse 105, temperature 36.4 °C (97.5 °F), resp. rate (!) 34, height 1.702 m (5' 7\"), weight 72.6 kg (160 lb 0.9 oz), SpO2 97 %.  Intake/Output last 3 Shifts:  I/O last 3 completed shifts:  In: 350 (4.8 mL/kg) [IV Piggyback:350]  Out: 700 (9.6 mL/kg) [Urine:700 (0.3 mL/kg/hr)]  Weight: 72.6 kg     Last CBC & BMP  Lab Results   Component Value Date    GLUCOSE 109 (H) 11/29/2023    CALCIUM 8.5 (L) 11/29/2023     (H) 11/29/2023    K 3.0 (L) 11/29/2023    CO2 44 (HH) 11/29/2023    CL 96 (L) 11/29/2023    BUN 28 (H) 11/29/2023    CREATININE 0.86 11/29/2023     Lab Results   Component Value Date    WBC 9.5 11/29/2023    HGB 11.1 (L) 11/29/2023    " HCT 37.3 11/29/2023     11/29/2023     11/29/2023         Assessment / Plan        Acute on chronic hypoxic respiratory failure  Hospital-acquired pneumonia  Possible COPD exacerbation  Bilateral pleural effusions  Sepsis  Hypertension  History of DVTs  History of PE  Plan:  -No significant changes from primary team plan  -Continue with Zosyn and azithromycin  -Legionella/strep/MRSA negative so far  -Wean O2 as tolerated  -Continue Eliquis  -As needed and scheduled DuoNebs  -Continue Mucinex Solu-Medrol  -Continue with diuresis of Lasix 40  -Avoid medications with significant respiratory depression side effects of possible  -Follow-up blood gas tomorrow to reassess CO2, patient has been showing improvement with BiPAP          Donis Clarke MD   PGY-1, Internal Medicine  This is a preliminary note, please await attending attestation for final A/P

## 2023-11-30 PROBLEM — I50.43 CHF (CONGESTIVE HEART FAILURE), NYHA CLASS I, ACUTE ON CHRONIC, COMBINED (MULTI): Status: ACTIVE | Noted: 2023-01-01

## 2023-11-30 PROBLEM — J16.0 CAP (COMMUNITY ACQUIRED PNEUMONIA) DUE TO CHLAMYDIA SPECIES: Status: ACTIVE | Noted: 2023-01-01

## 2023-11-30 NOTE — PROGRESS NOTES
"Wound care follow up    Patient has developed an unstageable sacral pressure ulcer 0.2 cm x 0.2 cm with yellow tissue and a light purple linear line is noted to the left of this ulcer. She had a blanchable intact area on her sacrum. However, due to the rapid decline in her medical condition and her respiratory failure, the need for bipap with her head of bed elevated above 90 degrees, This area on her sacrum also declined.   Her family was at bedside and was educated on the cause of the decline  Orders were received from MD and placed into her chart.    \"Sacrum: apply Venelex ointment 2 times a day, no dressing needed\"  D/W RED Domingo RN WOCN     "

## 2023-11-30 NOTE — CARE PLAN
The patient's goals for the shift include    Problem: Skin  Goal: Decreased wound size/increased tissue granulation at next dressing change  Flowsheets (Taken 11/29/2023 2149)  Decreased wound size/increased tissue granulation at next dressing change: Promote sleep for wound healing  Goal: Participates in plan/prevention/treatment measures  Flowsheets (Taken 11/29/2023 2149)  Participates in plan/prevention/treatment measures: Elevate heels  Goal: Prevent/manage excess moisture  Flowsheets (Taken 11/29/2023 2149)  Prevent/manage excess moisture: Cleanse incontinence/protect with barrier cream  Goal: Prevent/minimize sheer/friction injuries  Flowsheets (Taken 11/29/2023 2149)  Prevent/minimize sheer/friction injuries: HOB 30 degrees or less  Goal: Promote/optimize nutrition  Flowsheets (Taken 11/29/2023 2149)  Promote/optimize nutrition: Discuss with provider if NPO > 2 days  Goal: Promote skin healing  Flowsheets (Taken 11/29/2023 2149)  Promote skin healing: Turn/reposition every 2 hours/use positioning/transfer devices       The clinical goals for the shift include comfort and rest

## 2023-11-30 NOTE — PROGRESS NOTES
This SW was notified of hospice consult. SW reached out to attending Em to confirm if pt is GIP appropriate.   UPDATE 8:45am  This SW met with pt and family at bedside. SW met with 2 alternates/daughters -Lainey [phone] and Ly [phone]. Dtrs state that their brother Gregoria is the first .[phone]. SW asked to see POA ppw, dtr Lisa showed the SW POA ppw and confirmed 1. Gregoria, 2. Lainey, 3. Lisa. SW discussed hospice care with family, discussing GIP and inpatient hospice options. Dtrs state that they would like for pt to be GIP if possible, with [facility] choiced. SW states that she is waiting on confirmation from attendings if pt is GIP appropriate. Dtrs state that if made inpatient elsewhere, would prefer [facility]. SW states that she will contact primary decision maker and discuss same information.   SW called Gregoria, Gregoria states that he will be at bedside shortly. POONAM received update from Dr. Hurst that pt is GIP appropriate.   UPDATE 9:15am  SW met with family and Palliative care physician Dr Lion Diaz for hospice discussion. Family aware of GIP and this SW discussed sent referral to [facility] for GIP. SW met with family after this meeting and discussed GIP hospice in length. POAs in agreeance to this process.  UPDATE 10:50am  This SW received update that Jewel from [facility] will be meeting with family at 1:30pm today. SW updated treatment team. SW will continue to follow.   CRISTAL CLEMENTS

## 2023-11-30 NOTE — NURSING NOTE
RN Hospice Note    Zayda Grace is a Hospice Patient.   Hospice terminal diagnosis: COPD, Acute Respiratory Failure  Physician: CHRISTI Hurst  Visit type: GIP admission    Comments/recommendations: Meeting held on the unit with the sada Kinney ([facility]) and many other family members.  Discussed goals of care based on discussions they have had with the medical team.  Family wishes patient to transition to comfort measures only with hospice support.  All are aware of the patients prognosis once the BiPap is removed while on comfort measures.  Discussed with pt who is alert enough to answer simple questions, she affirms she is ready to have the Bipap removed.   Presented scope of hospice services including GIP level of hospice care with the sada Kinney signing hospice consents    Discharge Planning:  Patient to be discharged to nursing home if stable for transfer      Plan of care reviewed with patient/family members Patient, sada Kinney   Plan of care reviewed with hospital staff members: Milton Alaniz RN, Carolina LEVIN, Dr Hurst     Please notify [facility] of any changes in condition. Thank you.  Office: [phone] (8 am-6:30 pm M-F and 8 am-4:30 pm weekends and holidays)   [phone] (6:30 pm-8 am M-F and 4:30 pm-8 am weekends and holidays)    Davis Kline RN

## 2023-11-30 NOTE — PROGRESS NOTES
Speech-Language Pathology                 Therapy Communication Note    Patient Name: Zayda Grace  MRN: [id number]  Today's Date: 11/30/2023     Discipline: Speech Language Pathology    Missed Visit Reason:  Pt continues to require bipap respiratory support.   ST to continue to follow for PO readiness.     Missed Time: Attempt    Comment: Per pt's family at bedside, RN attempted pill with water yesterday evening, with immediate cough and emesis.      CXR 11/28  IMPRESSION:  Increasing patchy right basilar airspace opacity. Please correlate  clinically for pneumonia.      Stable small pleural effusions.      COPD/emphysema.

## 2023-11-30 NOTE — DISCHARGE SUMMARY
Discharge Diagnosis  #Pneumonia due to infectious organism, unspecified laterality, unspecified part of lung  #Acute on chronic hypoxic respiratory failure  #HAP  #Possible COPD exacerbation  #Bilateral pleural effusion  #Sepsis  #Hypotension, resolved  #History of DVT  #History of PE  #Hypertension  #Hyperlipidemia  #Heart failure with preserved ejection fraction  #GERD  #Urinary incontinence  #Hx COPD  Issues Requiring Follow-Up  None, Admitted to Hospice     Discharge Meds     Your medication list        STOP taking these medications      albuterol 90 mcg/actuation inhaler        Anoro Ellipta 62.5-25 mcg/actuation blister with device  Generic drug: umeclidinium-vilanteroL        atorvastatin 10 mg tablet  Commonly known as: Lipitor        cholecalciferol 50 mcg (2,000 unit) capsule  Commonly known as: Vitamin D-3        cyanocobalamin 500 mcg tablet  Commonly known as: Vitamin B-12        dapagliflozin propanediol 10 mg  Commonly known as: Farxiga        diclofenac sodium 1 % gel gel  Commonly known as: Voltaren        dilTIAZem  mg 24 hr capsule  Commonly known as: Tiazac        Eliquis 5 mg tablet  Generic drug: apixaban        fluticasone 50 mcg/actuation nasal spray  Commonly known as: Flonase        furosemide 40 mg tablet  Commonly known as: Lasix        ipratropium-albuteroL 0.5-2.5 mg/3 mL nebulizer solution  Commonly known as: Duo-Neb        levoFLOXacin 750 mg tablet  Commonly known as: Levaquin        losartan 25 mg tablet  Commonly known as: Cozaar        omeprazole 40 mg DR capsule  Commonly known as: PriLOSEC        oxybutynin XL 10 mg 24 hr tablet  Commonly known as: Ditropan-XL        polyethylene glycol 17 gram packet  Commonly known as: Glycolax, Miralax        sertraline 50 mg tablet  Commonly known as: Zoloft                 Test Results Pending At Discharge  Pending Labs       No current pending labs.            Hospital Course   Zayda Grace is a 83 y.o. female with past medical  history PE, DVT, COPD on 3 L home oxygen, recent admission for confusion,, congestive heart failure with ejection fraction of 45 to 50%, atrial fibrillation on Eliquis, hypertension, hyperlipidemia presented with chief complaint of shortness of breath.  Patient is a poor historian, history was supplemented by daughter at bedside.  Patient was apparently requiring up to 6 L to maintain her oxygen at approximately 95% at SNF, she was accordingly sent in for further management with concern for pneumonia. On initial presentation to the ED vital signs were temperature 98.6, heart 77, respiratory rate 19, blood pressure 103/53, oxygen saturation 99% on 4 L.  Labs show potassium 3.6, creatinine 0.82, , troponin 28, repeat 26, white blood cell count 7.4, hemoglobin 10.9, negative for flu and coronavirus.  Venous pH 7.4.  MRSA negative. Chest x-ray with small right and small to moderate left-sided pleural effusion with associated atelectasis.  CT angio was negative for central PE, there was left lower lobe consolidation and atelectasis.  There was nodular opacities within the right middle lobe measuring 1.5 times approximately 2 cm.  PET/CT or follow-up CT chest was recommended.  Patient was given azithromycin, Zosyn, vancomycin, prednisone in the ED as well as breathing treatments.  The patient was admitted to the general medical floor. The patient received diuretics, broad spectrum abx, and aggressive bronch hygiene. The patient developed increasing o2 requirements. Goals of care discussion was had and the patient was made dnr/dni. The patient was having persistent anxiety and was very uncomfortable as she was requiring continuous bipap. A goals of care discussion was had with the POA and the choice was made to proceed with hospice and honor the patients wishes. The patient will be discharge to hospice at a medical facility.       Pertinent Physical Exam At Time of Discharge  Physical Exam  General appearance: Acute  distress   HEENT: Atraumatic/normocephalic, moist mucosa  Neck: supple without obvious goiter, JVD not appreciated  Respiratory: increased resp. Effort. Craclkes BL  Cardiovascular: regular rate, regular rhythm, mild peripheral edema  Abdomen: no organomegaly, no tenderness to palpation in all quadrants  Extremities: strong peripheral pulses, no grossly obvious deformities   Neurologic: Alert and oriented x 2, No obvious focal deficit  Psych: appropriate mood & affect  Outpatient Follow-Up  No future appointments.      Miller Zapata, DO

## 2023-11-30 NOTE — PROGRESS NOTES
"Zayda Grace is a 83 y.o. female on day 6 of admission presenting with Pneumonia due to infectious organism, unspecified laterality, unspecified part of lung.    Subjective   Upon encounter, patient has the BiPAP on.  She appears to be in significant distress from her work of breathing.  Was not able to communicate with me because of the BiPAP.  She looks very tired.  Multiple family members at bedside.     Objective   Last Recorded Vitals  Blood pressure 112/67, pulse 76, temperature 36.3 °C (97.3 °F), resp. rate (!) 27, height 1.702 m (5' 7\"), weight 72.6 kg (160 lb 0.9 oz), SpO2 96 %.  Intake/Output last 3 Shifts:  I/O last 3 completed shifts:  In: 1500 (20.7 mL/kg) [IV Piggyback:1500]  Out: - (0 mL/kg)   Weight: 72.6 kg     Physical Exam    Physical Exam  Constitutional:       General: She is in acute distress.      Appearance: She is ill-appearing.      Comments: Awake alert and oriented x3.  She is frail looking and cachectic   HENT:      Mouth/Throat:      Mouth: Mucous membranes are dry.      Pharynx: Oropharynx is clear.   Eyes:      Pupils: Pupils are equal, round, and reactive to light.   Cardiovascular:      Rate and Rhythm: Normal rate and regular rhythm.      Heart sounds: Normal heart sounds.   Pulmonary:      Comments: She appears to be in respiratory distress.  Using accessory and abdominal muscles for breathing.  Poor air entry was distant breath sounds and diffuse rhonchi.  Abdominal:      General: Abdomen is flat. Bowel sounds are normal. There is no distension.      Palpations: Abdomen is soft.      Tenderness: There is no abdominal tenderness.   Musculoskeletal:      Comments: There is moderate pitting edema in both legs   Skin:     General: Skin is dry.      Coloration: Skin is pale.   Neurological:      General: No focal deficit present.   Psychiatric:         Mood and Affect: Mood normal.         Behavior: Behavior normal.         Thought Content: Thought content normal.         Judgment: " Judgment normal.        Current Facility-Administered Medications:     acetaminophen (Tylenol) tablet 650 mg, 650 mg, oral, q4h PRN, Maxim Kostyk, DO, 650 mg at 11/26/23 1641    apixaban (Eliquis) tablet 5 mg, 5 mg, oral, BID, Maxim Kostyk, DO, 5 mg at 11/28/23 0916    atorvastatin (Lipitor) tablet 10 mg, 10 mg, oral, Daily, Maxim Kostyk, DO, 10 mg at 11/28/23 0917    azithromycin (Zithromax) in dextrose 5 % in water (D5W) 250 mL  mg, 500 mg, intravenous, q24h, Maxim Kostyk, DO, Stopped at 11/29/23 2225    [Held by provider] dapagliflozin propanediol (Farxiga) tablet 10 mg, 10 mg, oral, Daily, Miller A Vranich, DO, 10 mg at 11/28/23 0916    diclofenac sodium (Voltaren) 1 % gel 1 Application, 4 g, Topical, 4x daily PRN, Maxim Kostyk, DO    dilTIAZem CD (Cardizem CD) 24 hr capsule 120 mg, 120 mg, oral, Daily, Maxim Kostyk, DO, 120 mg at 11/28/23 0916    fluticasone (Flonase) nasal spray 2 spray, 2 spray, Each Nostril, Daily, Maxim Kostyk, DO, 2 spray at 11/28/23 0916    [Held by provider] furosemide (Lasix) tablet 40 mg, 40 mg, oral, Daily, Miller A Vranich, DO, 40 mg at 11/28/23 0916    guaiFENesin (Mucinex) 12 hr tablet 600 mg, 600 mg, oral, BID, Maxim Kostyk, DO, 600 mg at 11/28/23 0916    hydrOXYzine HCL (Atarax) tablet 25 mg, 25 mg, oral, Once, Donis Clarke MD    ipratropium-albuteroL (Duo-Neb) 0.5-2.5 mg/3 mL nebulizer solution 3 mL, 3 mL, nebulization, q2h PRN, Maxim Kostyk, DO, 3 mL at 11/29/23 0952    ipratropium-albuteroL (Duo-Neb) 0.5-2.5 mg/3 mL nebulizer solution 3 mL, 3 mL, nebulization, TID, Onesimo Verma MD, 3 mL at 11/30/23 0706    [Held by provider] losartan (Cozaar) tablet 12.5 mg, 12.5 mg, oral, Daily, Maxim Kallik, DO, 12.5 mg at 11/28/23 0915    methylPREDNISolone sod succinate (PF) (SOLU-Medrol) 40 mg/mL injection 40 mg, 40 mg, intravenous, q24h, Denilson Mobley DO, 40 mg at 11/29/23 2125    oxybutynin (Ditropan) tablet 5 mg, 5 mg, oral, BID, Denilson Mobley DO, 5 mg at 11/28/23  0915    oxygen (O2) therapy, , inhalation, Continuous PRN - O2/gases, Ronnell Hurst DO    oxygen (O2) therapy, , inhalation, Continuous PRN - O2/gases, Ronnell Hurst, DO, Rate Change at 11/29/23 1534    pantoprazole (ProtoNix) EC tablet 40 mg, 40 mg, oral, Daily before breakfast, Denilson Mobley DO, 40 mg at 11/28/23 0916    piperacillin-tazobactam-dextrose (Zosyn) IV 3.375 g, 3.375 g, intravenous, q6h, Denilson Mobley DO, Stopped at 11/30/23 0630    polyethylene glycol (Glycolax, Miralax) packet 17 g, 17 g, oral, Daily, Denilson Tavareztyk DO, 17 g at 11/28/23 0916    QUEtiapine (SEROquel) tablet 25 mg, 25 mg, oral, BID PRN, Shakila Vale MD, 25 mg at 11/28/23 1841    sertraline (Zoloft) tablet 50 mg, 50 mg, oral, Daily, Denilson Mobley DO, 50 mg at 11/28/23 0916     Relevant Results  Results for orders placed or performed during the hospital encounter of 11/24/23 (from the past 24 hour(s))   POCT GLUCOSE   Result Value Ref Range    POCT Glucose 132 (H) 74 - 99 mg/dL   Basic metabolic panel   Result Value Ref Range    Glucose 114 (H) 74 - 99 mg/dL    Sodium 150 (H) 136 - 145 mmol/L    Potassium 3.4 (L) 3.5 - 5.3 mmol/L    Chloride 100 98 - 107 mmol/L    Bicarbonate 44 (HH) 21 - 32 mmol/L    Anion Gap 9 (L) 10 - 20 mmol/L    Urea Nitrogen 31 (H) 6 - 23 mg/dL    Creatinine 0.83 0.50 - 1.05 mg/dL    eGFR 69 >60 mL/min/1.73m*2    Calcium 8.5 (L) 8.6 - 10.3 mg/dL   CBC   Result Value Ref Range    WBC 6.5 4.4 - 11.3 x10*3/uL    nRBC 0.0 0.0 - 0.0 /100 WBCs    RBC 3.85 (L) 4.00 - 5.20 x10*6/uL    Hemoglobin 11.4 (L) 12.0 - 16.0 g/dL    Hematocrit 38.8 36.0 - 46.0 %     (H) 80 - 100 fL    MCH 29.6 26.0 - 34.0 pg    MCHC 29.4 (L) 32.0 - 36.0 g/dL    RDW 13.2 11.5 - 14.5 %    Platelets 167 150 - 450 x10*3/uL   Magnesium   Result Value Ref Range    Magnesium 2.15 1.60 - 2.40 mg/dL      XR chest 1 view    Result Date: 11/28/2023  Interpreted By:  Jazmyn Purdy, STUDY: XR CHEST 1 VIEW;  11/28/2023 5:59 pm   INDICATION:  Signs/Symptoms:Increased o2 needs.   COMPARISON: 11/27/2023   ACCESSION NUMBER(S): [id number]   ORDERING CLINICIAN: EDILMA BLACKWELL   FINDINGS:     CARDIOMEDIASTINAL SILHOUETTE: Stable cardiomegaly.   LUNGS: Increasing right basilar patchy airspace opacity. Small bilateral pleural effusions. Hyperinflated lungs with interstitial coarsening consistent with edema/COPD. No pneumothorax.   ABDOMEN: No remarkable upper abdominal findings.   BONES: No acute osseous abnormality.       Increasing patchy right basilar airspace opacity. Please correlate clinically for pneumonia.   Stable small pleural effusions.   COPD/emphysema.   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  Patient was found to have an acute on chronic hypoxemic respiratory failure in the setting of a possible COPD exacerbation.  Patient was admitted to the medical service.  Started on broad-spectrum IV antibiotics.  Supplemental oxygen provided.  Given the underlying comorbidities and poor prognosis, palliative care consulted for advanced care planning.        Patient's clinical condition significantly worsened the past 24 hours.  Despite the BiPAP, she continued to have tachypnea and shortness of breath.  I had a another very lengthy  discussion with the family at bedside.  I explained to them the clinical condition of the patient now.  I did tell them that our options are very much limited now.  Either we will have to proceed with endotracheal intubation which none of us as caregivers would recommend or focus on comfort.  Family did agree that we should not proceed with endotracheal intubation but rather focus on comfort.  I introduced the philosophy of hospice care and the services it provides.  I explained how hospice attends to patient's quality of life and dignity while the disease takes its natural course.  I emphasized how hospice focuses on decreasing the burden of the disease and providing comfort not only for the patient but also for the family and caregivers.  Patient's clinical condition qualifies her for GIP hospice.  So we will send for referral.  I will start morphine infusion at rate of 4 mg/hour with additional 1 mg every 30 minutes as needed for the respiratory distress.    Thank you for allowing us to participate in the care of this lady.  Should you have any further questions or concerns regarding her care, please do not hesitate to contact us via [employer] (Damaris Dyson during weekdays work hours and River Diaz during weekdays after hours and over the weekend)    (This note was generated with voice recognition software and may contain errors including spelling, grammar, syntax and misrecognition of what was dictated, that are not fully corrected)     River Diaz MD

## 2023-11-30 NOTE — DOCUMENTATION CLARIFICATION NOTE
"    PATIENT:               BAM NERI  ACCT #:                  [id number]  MRN:                       [id number]  :                       1940  ADMIT DATE:       2023 11:27 AM  DISCH DATE:        2023 6:50 PM  RESPONDING PROVIDER #:        [id number]          PROVIDER RESPONSE TEXT:    Acute on Chronic Diastolic Congestive Heart Failure    CDI QUERY TEXT:    [facility]_CHF        Instruction:    Based on your assessment of the patient and the clinical information, please provide the requested documentation by clicking on the appropriate radio button and enter any additional information if prompted.    Question: Please further clarify the type and acuity of congestive heart failure    When answering this query, please exercise your independent professional judgment. The fact that a question is being asked, does not imply that any particular answer is desired or expected.    The patient's clinical indicators include:  Clinical Information: Per H/P 23 by Dr. Charlie Weller \"83 y.o. female presenting with past medical history significant for prior PE was (), DVT (2016), COPD on 3 L home oxygen, presents to the emergency department confusion. Daughter is at bedside supplementing history. Family mentioned she has been progressively more confused and having multiple falls over the past month. She normally sundown's after 3 PM however her mentation has changed from baseline.\"    Clinical Indicators:  23     Per H/P 23 by Dr. Charlie Weller  \"Congestive diastolic heart failure ejection fraction 50 to 55 percent Bilateral lower extremity edema secondary to above\"    Per cardiology consult 23 by Dr. Schmitt \"Possible mild diastolic heart failure. BNP only modestly elevated. She did not have her diuretics at home for couple days which is likely the cause of volume retention. Agree with transitioning IV diuretics to oral diuretics if she appears to be reasonably euvolemic.\"    Per progress " "note- cardiology by Dr. Schmitt 11/19/2023  \"HFpEF.  EF 45 to 50 percent.  Patient is now on an SGLT2 inhibitor.  Continue oral diuretics.  Fairly euvolemic.\"    Treatment: IV Lasix x3, PO lasix    Risk Factors: Age, HTN, Atrial fibrillation, history of CHF, dementia  Options provided:  -- Acute on Chronic Diastolic Congestive Heart Failure  -- Chronic Diastolic Congestive Heart Failure  -- Other - I will add my own diagnosis  -- Refer to Clinical Documentation Reviewer    Query created by: Riddhi Wiseman on 11/28/2023 12:41 PM      Electronically signed by:  LUIS E RUTH MD 11/30/2023 3:26 PM          "

## 2023-11-30 NOTE — PROGRESS NOTES
11/30/2023   Pt on Bipap at 50% per notes.  IV zithromax, iv solumedrol.  CT Team will continue to follow. Discharge plan remains to return to [facility]. [facility] yesterday-11, mod recommended.   Gladis Whaley RN T[facility]    11/30/2023   Order for hospice consult. Social work following up.    Gladis Whaley RN TCC

## 2023-12-01 NOTE — CARE PLAN
Problem: Pain  Goal: My pain/discomfort is manageable  Outcome: Progressing     Problem: Safety  Goal: Patient will be injury free during hospitalization  Outcome: Progressing  Goal: I will remain free of falls  Outcome: Progressing     Problem: Daily Care  Goal: Daily care needs are met  Outcome: Progressing     Problem: Psychosocial Needs  Goal: Demonstrates ability to cope with hospitalization/illness  Outcome: Progressing  Goal: Collaborate with me, my family, and caregiver to identify my specific goals  Outcome: Progressing     Problem: Discharge Barriers  Goal: My discharge needs are met  Outcome: Progressing     Problem: Communication Deficit  Goal: Patient/caregiver/family will effectively communicate symptoms, needs and concerns  Outcome: Progressing     Problem: Dyspnea at end of life  Goal: Patient/caregiver/family will demonstrate understanding of an ability to manage respiratory symptoms at end of life  Outcome: Progressing     Problem: Imminent death  Goal: Collaborate with patient, family, caregiver and interdisciplinary team to minimize end of life symptoms  Outcome: Progressing     Problem: Skin  Goal: Decreased wound size/increased tissue granulation at next dressing change  Outcome: Progressing  Goal: Participates in plan/prevention/treatment measures  Outcome: Progressing  Goal: Prevent/manage excess moisture  Outcome: Progressing  Goal: Prevent/minimize sheer/friction injuries  Outcome: Progressing  Goal: Promote/optimize nutrition  Outcome: Progressing  Goal: Promote skin healing  Outcome: Progressing   The patient's goals for the shift include comfort/end of life    The clinical goals for the shift include comfort/end of life

## 2023-12-01 NOTE — CARE PLAN
The patient's goals for the shift include comfort/end of life    The clinical goals for the shift include comfort/end of life

## 2023-12-01 NOTE — PROGRESS NOTES
RN Hospice Note    Zayda Grace is a Hospice Patient.   Hospice terminal diagnosis: COPD  Physician: Dr. Hurst   Visit type: Routine follow up gip visit - gip day 2    Comments/recommendations: This RN present for follow up visit.  Pt had moved to rm 726 this am, son ondina, several dtrs and adult grandchildren at bedside, shared they were upset with pt room change this am, upset at how they found pt in bed this am, shared they had vented to the floor staff.   Family apologizing to staff for earlier interactions, appear calm and to be coping well at this time.  Dr. Lion Diaz had seen pt this am, made med changes to address pts fever as well as increased resp sx's, morphine gtt increased to 2mg/hr cont, will add ivp toradol for fever vs acetaminphen r/s.  Pts family requested another anointing for pt, this rn called [facility] pastoral care, reverend perez out until Monday.  This RN called [facility] and per  priest Leida will visit pt for anointing today.  Family appreciative of hospice support.  Bedside RN Elizabeth did follow up with med change orders.  Pts family and [facility] staff aware this rn in house this am and available for any follow up needs.  Thanks for the collaborative care of this pt and family.      ADDENDUM 1348:  This RN was made aware pt  via epic chat from william dawson.  This RN went to room, offered condolences to son, ondina, and all family present at bedside.  Many family tearful however able to share they were at peace with pt passing and were grateful they were present.  Family aware [facility] bvt staff will follow up in a few weeks, encouraged to call [facility] with any needs prior to that follow up call.  Thanks for the collaborative care of this pt and family.     Discharge Planning:  Patient to be discharged to:  No discharge plan in place, pt appears to be actively dying.     Plan of care reviewed with patient/family members RED Johnson/WILLIAM Dawson   Plan of care reviewed with hospital staff  members: Dr. Lion Diaz/Dr. Hurst     Please notify [facility] of any changes in condition. Thank you.  Office: [phone] (8 am-6:30 pm M-F and 8 am-4:30 pm weekends and holidays)   [phone] (6:30 pm-8 am M-F and 4:30 pm-8 am weekends and holidays)    Kayleen Henderson RN

## 2023-12-01 NOTE — CONSULTS
Reason For Consult  Advanced Care Planning     History Of Present Illness   83-year-old female admitted to hospice services with a primary diagnosis of COPD.  Patient was admitted yesterday to [facility] hospice services for tachypnea and respiratory distress.  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  Palliative care is following for symptom management.  Upon encounter, patient obtunded unresponsive.  Multiple family members at bedside.  Patient still having tachypnea.  No facial grimacing or frowning or moaning.  Family reporting that she feels hot and warm.     Review of Systems  10 systems were reviewed and were negative except for those noted in the history of present illness.     Past Medical History  She has a past medical history of Acute embolism and thrombosis of other specified deep vein of lower extremity, bilateral (CMS/HCC) (12/16/2016) and Personal history of pulmonary embolism (12/16/2016).  Pertinent medical history also documented in my above narrative    Surgical History  She has a past surgical history that includes Back surgery (12/16/2016); Knee surgery (12/16/2016); MR angio head wo IV contrast (7/8/2016); and MR angio neck wo IV contrast (7/8/2016).  Pertinent surgical history also documented in my above narrative     Social History  She reports that she has never smoked. She has never been exposed to tobacco smoke. She has never used smokeless tobacco. No history on file for alcohol use and drug use.    Family History  No family history on file.     Allergies  Codeine, Etodolac, Hydromorphone, Lisinopril, and Tramadol    Home Medications  No medications prior to admission.        Current Hospital Medications    Current Facility-Administered Medications:     acetaminophen (Tylenol) suppository 650 mg, 650 mg,  rectal, q4h PRN, River Diaz MD    glycopyrrolate (Robinul) 200 mcg/mL injection 0.2 mg, 0.2 mg, intravenous, q4h PRN, Miller Zapata DO    glycopyrrolate (Robinul) 200 mcg/mL injection 0.2 mg, 0.2 mg, intravenous, q4h PRN, River Diaz MD    haloperidol lactate (Haldol) injection 1 mg, 1 mg, intravenous, q4h PRN, River Diaz MD    ketorolac (Toradol) injection 15 mg, 15 mg, intravenous, q6h PRN, River Diaz MD    LORazepam (Ativan) injection 0.5 mg, 0.5 mg, intravenous, q4h PRN, Miller Zapata DO    LORazepam (Ativan) injection 1 mg, 1 mg, intravenous, q1h PRN, River Diaz MD, 1 mg at 11/30/23 1902    morphine injection 2 mg, 2 mg, intravenous, q15 min PRN, Miller Zapata DO, 2 mg at 11/30/23 1717    morphine PCA 1 mg/mL in NS EOL, , intravenous, Continuous, River Diaz MD, Rate Change at 11/30/23 1630    oxygen (O2) therapy, , inhalation, Continuous PRN - O2/gases, River Diaz MD       Physical Exam  Physical Exam  Constitutional:       General: She is in acute distress.      Appearance: She is ill-appearing.      Comments: Obtunded unresponsive.  Frail looking and cachectic   HENT:      Mouth/Throat:      Mouth: Mucous membranes are dry.      Pharynx: Oropharynx is clear.   Eyes:      Pupils: Pupils are equal, round, and reactive to light.   Cardiovascular:      Rate and Rhythm: Regular rhythm. Tachycardia present.      Heart sounds: Normal heart sounds.   Pulmonary:      Comments: Tachypnea with a respiratory rate around 24.  Diminished air entry at the lower lobes with distant breath sounds.  There is also mild rhonchi  Abdominal:      General: Abdomen is flat. Bowel sounds are normal. There is no distension.      Palpations: Abdomen is soft.      Tenderness: There is no abdominal tenderness.   Musculoskeletal:         General: No swelling.   Skin:     General: Skin is warm.           Last Recorded Vitals  Blood pressure (!) 66/36, temperature 39 °C (102.2  °F), SpO2 91 %.    Relevant Results  No results found for this or any previous visit (from the past 24 hour(s)).     Imaging  No results found.        Assessment/Plan     Patient is having still the tachypnea.  I will go ahead and increase the morphine infusion and make it 2 mg/hour and keep the 1 mg every 30 minutes as needed.  For the fever, I will go ahead and give Toradol 15 mg IV every 6 hours as needed and as a first-line.  Tylenol as a second line.    -Oxygen 2-4 liters via NC prn comfort  -Ativan 1mg IV q1h prn for anxiety, restlessness, and/or insomnia  -Haldol 1mg IV q4h prn for agitation, nausea, and/or vomiting  -Robinul 0.2mg IV q4h prn for secretions  -Dulcolax suppository every other day as needed for constipation  -Mouthcare qshift  -Turn patient q2h if possible    (This note was generated with voice recognition software and may contain errors including spelling, grammar, syntax and misrecognition of what was dictated, that are not fully corrected)    (This note was generated with voice recognition software and may contain errors including spelling, grammar, syntax and misrecognition of what was dictated, that are not fully corrected)     River Diaz MD

## 2023-12-01 NOTE — H&P
History Of Present Illness  Zayda Grace is a 83 y.o. female presenting with 83 y.o. female with past medical history PE, DVT, COPD on 3 L home oxygen, recent admission for confusion,, congestive heart failure with ejection fraction of 45 to 50%, atrial fibrillation on Eliquis, hypertension, hyperlipidemia presenting with chief complaint of shortness of breath.  Patient is a poor historian, history was supplemented by daughter at bedside.  Patient was apparently requiring up to 6 L to maintain her oxygen at approximately 95% at SNF, she was accordingly sent in for further management with concern for pneumonia.  Patient is complaining of productive cough, daughter states she coughed up some blood earlier today.     In the ED vital signs were temperature 98.6, heart 77, respiratory rate 19, blood pressure 103/53, oxygen saturation 99% on 4 L.  Labs show potassium 3.6, creatinine 0.82, , troponin 28, repeat 26, white blood cell count 7.4, hemoglobin 10.9, negative for flu and coronavirus.  Venous pH 7.4.  MRSA negative. Chest x-ray with small right and small to moderate left-sided pleural effusion with associated atelectasis.  CT angio was negative for central PE, there was left lower lobe consolidation and atelectasis.  There was nodular opacities within the right middle lobe measuring 1.5 times approximately 2 cm.  PET/CT or follow-up CT chest was recommended.  Patient was given azithromycin, Zosyn, vancomycin, prednisone in the ED as well as breathing treatments.  She will be admitted to the medicine service.    Patient was admitted for further management however hospital course was complicated by persistent hypercapnia and hypoxic respiratory failure. Due to this goals of care discussions were held and hospice consulted. POA ultimately elected for GIP admission. Patient was transitioned to hospice status and admitted for further management.     Past Medical History  Past Medical History:   Diagnosis Date     Acute embolism and thrombosis of other specified deep vein of lower extremity, bilateral (CMS/HCC) 12/16/2016    Deep vein thrombosis (DVT) of other vein of both lower extremities    Personal history of pulmonary embolism 12/16/2016    History of pulmonary embolism       Surgical History  Past Surgical History:   Procedure Laterality Date    BACK SURGERY  12/16/2016    Back Surgery    KNEE SURGERY  12/16/2016    Knee Surgery Left    MR HEAD ANGIO WO IV CONTRAST  7/8/2016    MR HEAD ANGIO WO IV CONTRAST 7/8/2016 [facility] IN[facility]IENT [facility]    MR NECK ANGIO WO IV CONTRAST  7/8/2016    MR NECK ANGIO WO IV CONTRAST 7/8/2016 [facility] INPATIENT LEGACY        Social History  She reports that she has never smoked. She has never been exposed to tobacco smoke. She has never used smokeless tobacco. No history on file for alcohol use and drug use.    Family History  No family history on file.     Allergies  Codeine, Etodolac, Hydromorphone, Lisinopril, and Tramadol    Review of Systems   Reason unable to perform ROS: unable to obtain due to ams.        Physical Exam  Constitutional:       General: She is in acute distress.      Appearance: She is ill-appearing.      Comments: Frail elderly female   Eyes:      Pupils: Pupils are equal, round, and reactive to light.   Cardiovascular:      Rate and Rhythm: Tachycardia present.      Heart sounds: Murmur heard.   Pulmonary:      Effort: Respiratory distress present.      Breath sounds: Wheezing present.      Comments: On bipap  Abdominal:      General: Abdomen is flat. Bowel sounds are normal. There is no distension.   Musculoskeletal:      Right lower leg: No edema.      Left lower leg: No edema.   Skin:     General: Skin is warm.   Neurological:      Motor: Weakness present.   Psychiatric:      Comments: anxious          Last Recorded Vitals  There were no vitals taken for this visit.    Relevant Results             Assessment/Plan   Principal Problem:    CAP (community acquired pneumonia)  due to Chlamydia species  Active Problems:    CHF (congestive heart failure), NYHA class I, acute on chronic, combined (CMS/[facility])      Patient admitted for further management for hospice care. Continue supportive care, encourage non pharacological interventions for air hunger. Appreciate palliative recommendations for comfort measures. Appreciate hospice managmenet. Hold off on lab and imaging due to hospice status         Ronnell Hurst, DO

## 2023-12-01 NOTE — CARE PLAN
The patient's goals for the shift include  NA  Problem: Pain  Goal: My pain/discomfort is manageable  Outcome: Progressing  Note: On morphine gtt     Problem: Safety  Goal: Patient will be injury free during hospitalization  Outcome: Progressing  Goal: I will remain free of falls  Outcome: Progressing     Problem: Daily Care  Goal: Daily care needs are met  Outcome: Progressing     Problem: Psychosocial Needs  Goal: Demonstrates ability to cope with hospitalization/illness  Outcome: Progressing  Goal: Collaborate with me, my family, and caregiver to identify my specific goals  Outcome: Progressing     Problem: Discharge Barriers  Goal: My discharge needs are met  Outcome: Progressing     Problem: Communication Deficit  Goal: Patient/caregiver/family will effectively communicate symptoms, needs and concerns  Outcome: Progressing     Problem: Dyspnea at end of life  Goal: Patient/caregiver/family will demonstrate understanding of an ability to manage respiratory symptoms at end of life  Outcome: Progressing     Problem: Imminent death  Goal: Collaborate with patient, family, caregiver and interdisciplinary team to minimize end of life symptoms  Outcome: Progressing       The clinical goals for the shift include comfort/end of life.  Recommendations include continued support for pt and family.

## 2023-12-01 NOTE — DISCHARGE SUMMARY
Discharge Diagnosis  #Pneumonia due to infectious organism, unspecified laterality, unspecified part of lung  #Acute on chronic hypoxic respiratory failure  #HAP  #Possible COPD exacerbation  #Bilateral pleural effusion  #Sepsis  #Hypotension, resolved  #History of DVT  #History of PE  #Hypertension  #Hyperlipidemia  #Heart failure with preserved ejection fraction  #GERD  #Urinary incontinence  #Hx COPD      Issues Requiring Follow-Up  None     Discharge Meds     Your medication list      You have not been prescribed any medications.         Test Results Pending At Discharge  Pending Labs       No current pending labs.            Hospital Course    Zayda Grace is a 83 y.o. female with past medical history PE, DVT, COPD on 3 L home oxygen, recent admission for confusion,, congestive heart failure with ejection fraction of 45 to 50%, atrial fibrillation on Eliquis, hypertension, hyperlipidemia presented with chief complaint of shortness of breath.  Patient is a poor historian, history was supplemented by daughter at bedside.  Patient was apparently requiring up to 6 L to maintain her oxygen at approximately 95% at SNF, she was accordingly sent in for further management with concern for pneumonia. On initial presentation to the ED vital signs were temperature 98.6, heart 77, respiratory rate 19, blood pressure 103/53, oxygen saturation 99% on 4 L.  Labs show potassium 3.6, creatinine 0.82, , troponin 28, repeat 26, white blood cell count 7.4, hemoglobin 10.9, negative for flu and coronavirus.  Venous pH 7.4.  MRSA negative. Chest x-ray with small right and small to moderate left-sided pleural effusion with associated atelectasis.  CT angio was negative for central PE, there was left lower lobe consolidation and atelectasis.  There was nodular opacities within the right middle lobe measuring 1.5 times approximately 2 cm.  PET/CT or follow-up CT chest was recommended.  Patient was given azithromycin, Zosyn,  vancomycin, prednisone in the ED as well as breathing treatments.  The patient was admitted to the general medical floor. The patient received diuretics, broad spectrum abx, and aggressive bronch hygiene. The patient developed increasing o2 requirements. Goals of care discussion was had and the patient was made dnr/dni. The patient was having persistent anxiety and was very uncomfortable as she was requiring continuous bipap. A goals of care discussion was had with the POA and the choice was made to proceed with hospice and honor the patients wishes. The patient was admitted to hospice at a medical facility. The patient  at 1:27pm 2023.    Pertinent Physical Exam At Time of Discharge  Physical Exam    Outpatient Follow-Up  No future appointments.      Miller Zapata, DO

## 2023-12-13 LAB — HOLD SPECIMEN: NORMAL

## 2023-12-22 ENCOUNTER — HOSPITAL ENCOUNTER (OUTPATIENT)
Dept: CARDIOLOGY | Facility: HOSPITAL | Age: 83
Discharge: HOME | End: 2023-12-22
Payer: MEDICARE

## 2023-12-22 PROCEDURE — 93005 ELECTROCARDIOGRAM TRACING: CPT

## 2023-12-29 LAB
Q ONSET: 232 MS
QRS COUNT: 13 BEATS
QRS DURATION: 118 MS
QT INTERVAL: 412 MS
QTC CALCULATION(BAZETT): 463 MS
QTC FREDERICIA: 445 MS
R AXIS: -61 DEGREES
T AXIS: 132 DEGREES
T OFFSET: 438 MS
VENTRICULAR RATE: 76 BPM